# Patient Record
Sex: FEMALE | Race: WHITE | NOT HISPANIC OR LATINO | Employment: UNEMPLOYED | URBAN - METROPOLITAN AREA
[De-identification: names, ages, dates, MRNs, and addresses within clinical notes are randomized per-mention and may not be internally consistent; named-entity substitution may affect disease eponyms.]

---
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
## 2020-01-02 ENCOUNTER — PATIENT MESSAGE (OUTPATIENT)
Dept: NEUROLOGY | Facility: CLINIC | Age: 20
End: 2020-01-02

## 2020-01-02 ENCOUNTER — OFFICE VISIT (OUTPATIENT)
Dept: NEUROLOGY | Facility: CLINIC | Age: 20
End: 2020-01-02
Payer: COMMERCIAL

## 2020-01-02 ENCOUNTER — LAB VISIT (OUTPATIENT)
Dept: LAB | Facility: HOSPITAL | Age: 20
End: 2020-01-02
Attending: PSYCHIATRY & NEUROLOGY
Payer: COMMERCIAL

## 2020-01-02 VITALS
WEIGHT: 227.5 LBS | BODY MASS INDEX: 40.31 KG/M2 | HEIGHT: 63 IN | DIASTOLIC BLOOD PRESSURE: 80 MMHG | SYSTOLIC BLOOD PRESSURE: 118 MMHG | HEART RATE: 90 BPM

## 2020-01-02 DIAGNOSIS — G40.109 EPILEPSY, LOCALIZATION-RELATED: ICD-10-CM

## 2020-01-02 DIAGNOSIS — E16.1 HYPERINSULINISM: ICD-10-CM

## 2020-01-02 DIAGNOSIS — E66.01 MORBID OBESITY WITH BMI OF 40.0-44.9, ADULT: ICD-10-CM

## 2020-01-02 LAB
ALBUMIN SERPL BCP-MCNC: 3.7 G/DL (ref 3.5–5.2)
ALP SERPL-CCNC: 109 U/L (ref 55–135)
ALT SERPL W/O P-5'-P-CCNC: 13 U/L (ref 10–44)
ANION GAP SERPL CALC-SCNC: 10 MMOL/L (ref 8–16)
AST SERPL-CCNC: 12 U/L (ref 10–40)
BASOPHILS # BLD AUTO: 0.02 K/UL (ref 0–0.2)
BASOPHILS NFR BLD: 0.3 % (ref 0–1.9)
BILIRUB SERPL-MCNC: 0.2 MG/DL (ref 0.1–1)
BUN SERPL-MCNC: 18 MG/DL (ref 6–20)
CALCIUM SERPL-MCNC: 9.4 MG/DL (ref 8.7–10.5)
CHLORIDE SERPL-SCNC: 102 MMOL/L (ref 95–110)
CO2 SERPL-SCNC: 24 MMOL/L (ref 23–29)
CREAT SERPL-MCNC: 0.9 MG/DL (ref 0.5–1.4)
DIFFERENTIAL METHOD: ABNORMAL
EOSINOPHIL # BLD AUTO: 0.1 K/UL (ref 0–0.5)
EOSINOPHIL NFR BLD: 0.9 % (ref 0–8)
ERYTHROCYTE [DISTWIDTH] IN BLOOD BY AUTOMATED COUNT: 11.7 % (ref 11.5–14.5)
EST. GFR  (AFRICAN AMERICAN): >60 ML/MIN/1.73 M^2
EST. GFR  (NON AFRICAN AMERICAN): >60 ML/MIN/1.73 M^2
GLUCOSE SERPL-MCNC: 317 MG/DL (ref 70–110)
HCT VFR BLD AUTO: 36.2 % (ref 37–48.5)
HGB BLD-MCNC: 12.3 G/DL (ref 12–16)
IMM GRANULOCYTES # BLD AUTO: 0.01 K/UL (ref 0–0.04)
IMM GRANULOCYTES NFR BLD AUTO: 0.2 % (ref 0–0.5)
LYMPHOCYTES # BLD AUTO: 1.3 K/UL (ref 1–4.8)
LYMPHOCYTES NFR BLD: 20 % (ref 18–48)
MCH RBC QN AUTO: 31.5 PG (ref 27–31)
MCHC RBC AUTO-ENTMCNC: 34 G/DL (ref 32–36)
MCV RBC AUTO: 93 FL (ref 82–98)
MONOCYTES # BLD AUTO: 0.5 K/UL (ref 0.3–1)
MONOCYTES NFR BLD: 7.6 % (ref 4–15)
NEUTROPHILS # BLD AUTO: 4.7 K/UL (ref 1.8–7.7)
NEUTROPHILS NFR BLD: 71 % (ref 38–73)
NRBC BLD-RTO: 0 /100 WBC
PLATELET # BLD AUTO: 122 K/UL (ref 150–350)
PMV BLD AUTO: 12.4 FL (ref 9.2–12.9)
POTASSIUM SERPL-SCNC: 4.7 MMOL/L (ref 3.5–5.1)
PROT SERPL-MCNC: 7.7 G/DL (ref 6–8.4)
RBC # BLD AUTO: 3.9 M/UL (ref 4–5.4)
SODIUM SERPL-SCNC: 136 MMOL/L (ref 136–145)
WBC # BLD AUTO: 6.61 K/UL (ref 3.9–12.7)

## 2020-01-02 PROCEDURE — 80053 COMPREHEN METABOLIC PANEL: CPT

## 2020-01-02 PROCEDURE — 99999 PR PBB SHADOW E&M-EST. PATIENT-LVL III: CPT | Mod: PBBFAC,,, | Performed by: PSYCHIATRY & NEUROLOGY

## 2020-01-02 PROCEDURE — 36415 COLL VENOUS BLD VENIPUNCTURE: CPT | Mod: PO

## 2020-01-02 PROCEDURE — 99205 OFFICE O/P NEW HI 60 MIN: CPT | Mod: S$GLB,,, | Performed by: PSYCHIATRY & NEUROLOGY

## 2020-01-02 PROCEDURE — 80183 DRUG SCRN QUANT OXCARBAZEPIN: CPT

## 2020-01-02 PROCEDURE — 99205 PR OFFICE/OUTPT VISIT, NEW, LEVL V, 60-74 MIN: ICD-10-PCS | Mod: S$GLB,,, | Performed by: PSYCHIATRY & NEUROLOGY

## 2020-01-02 PROCEDURE — 85025 COMPLETE CBC W/AUTO DIFF WBC: CPT

## 2020-01-02 PROCEDURE — 99999 PR PBB SHADOW E&M-EST. PATIENT-LVL III: ICD-10-PCS | Mod: PBBFAC,,, | Performed by: PSYCHIATRY & NEUROLOGY

## 2020-01-02 RX ORDER — LEVONORGESTREL / ETHINYL ESTRADIOL AND ETHINYL ESTRADIOL 150-30(84)
1 KIT ORAL
COMMUNITY
Start: 2017-08-03 | End: 2021-01-07

## 2020-01-02 RX ORDER — OXCARBAZEPINE 300 MG/1
TABLET, FILM COATED ORAL
COMMUNITY
Start: 2019-06-03 | End: 2020-01-02 | Stop reason: SDUPTHER

## 2020-01-02 RX ORDER — LACOSAMIDE 200 MG/1
200 TABLET ORAL EVERY 12 HOURS
Qty: 60 TABLET | Refills: 5 | Status: SHIPPED | OUTPATIENT
Start: 2020-01-02 | End: 2020-01-27 | Stop reason: SDUPTHER

## 2020-01-02 RX ORDER — LEVOCETIRIZINE DIHYDROCHLORIDE 5 MG/1
5 TABLET, FILM COATED ORAL
COMMUNITY

## 2020-01-02 RX ORDER — MULTIVITAMIN
TABLET ORAL
COMMUNITY

## 2020-01-02 RX ORDER — MIDAZOLAM HYDROCHLORIDE 5 MG/ML
INJECTION INTRAMUSCULAR; INTRAVENOUS
COMMUNITY
Start: 2018-05-09 | End: 2020-06-11 | Stop reason: SDUPTHER

## 2020-01-02 RX ORDER — LEVOTHYROXINE SODIUM 137 UG/1
TABLET ORAL
COMMUNITY
Start: 2019-12-05 | End: 2020-07-16 | Stop reason: SDUPTHER

## 2020-01-02 RX ORDER — LACOSAMIDE 150 MG/1
TABLET ORAL
COMMUNITY
Start: 2019-08-05 | End: 2020-01-02 | Stop reason: DRUGHIGH

## 2020-01-02 NOTE — PROGRESS NOTES
Subjective:      Patient ID: Nirali Duenas is a 19 y.o. female.    Chief Complaint: She has epilepsy and knees in adult neurologist     The patient's mother, grandmother, and patient are in the clinic today with the history given of a seizure disorder that has been present since infancy L Long with a history of hyper insulin is and that was treated with a surgical procedure as an infant.  Following that a surgical procedure, the patient had episodes of hypoglycemia but this is now being controlled with medication.  The patient had been followed by [facility] in [facility] until reaching adult years when she now needs an adult neurologist.    The patient's mother indicates that the seizure develops without an aura.  The patient herself is not aware of an aura either.  The parents who have witnessed multiple of the episodes indicates that the patient will to suddenly stop activity and stares straight ahead then will have drooling followed by jerking that seems to occasionally start on the left but very quickly becomes bilateral with tonic-clonic seizure activity with her eyes rolled up.  She develops cyanosis around her lips and occasionally has tongue biting.  The seizure has variable duration but generally is less than 1-2 minutes.  The seizure is then followed by postictal drowsiness and sleeping and occasional nausea and vomiting.  Recovery from the seizure is variable in terms of time and sometimes can take 1-2 days to fully recover.    The patient's mother has been very accurate in keeping a seizure log in indicate that her last seizure occurred Thanksgiving, 2019.  Previous seizures had occurred on November 6th, August 20 6th of July 24th of July 16th and 1 other time in July.   Over the years since infancy, the longest seizure-free interval was 1 year. The mother indicates that she has noted the seizures could be triggered perhaps by stress and not resting consistently.  She has not had any  recent episodes of hypoglycemia.    The patient is a current seizure medication is Vimpat 150 mg twice a day and Trileptal 300 mg at night with 450 mg in the morning.    Past medications have included Keppra which produced severe anger management difficulties along with extreme moodiness.  The patient as an infant was on Dilantin and phenobarbital.  She was on Zonegran briefly but this was discontinued for unknown reasons.  She was on Tegretol briefly before switching to try Trileptal.  She had access to Diastat rectally for intractable seizure but now is utilizing Versed nasal spray as needed for prolonged or repetitive seizure.  The patient has not been on any other seizure medication.          ROS:  GENERAL: NO FEVER, CHILLS, FATIGABILITY OR WEIGHT LOSS.  SKIN: NO RASHES, ITCHING OR CHANGES IN COLOR OR TEXTURE OF SKIN.  HEAD: NO HEADACHES OR RECENT HEAD TRAUMA.  EYES: VISUAL ACUITY FINE. NO PHOTOPHOBIA, OCULAR PAIN OR DIPLOPIA.  EARS: DENIES EAR PAIN, DISCHARGE OR VERTIGO.  NOSE: NO LOSS OF SMELL, NO EPISTAXIS OR POSTNASAL DRIP.  MOUTH & THROAT: NO HOARSENESS OR CHANGE IN VOICE. NO EXCESSIVE GUM BLEEDING.  NODES: DENIES SWOLLEN GLANDS.  CHEST: DENIES POE, CYANOSIS, WHEEZING, COUGH AND SPUTUM PRODUCTION.  CARDIOVASCULAR: DENIES CHEST PAIN, PND, ORTHOPNEA OR REDUCED EXERCISE TOLERANCE.  ABDOMEN: APPETITE FINE. NO WEIGHT LOSS. DENIES DIARRHEA, ABDOMINAL PAIN, HEMATEMESIS OR BLOOD IN STOOL.  URINARY: NO FLANK PAIN, DYSURIA OR HEMATURIA.  PERIPHERAL VASCULAR: NO CLAUDICATION OR CYANOSIS.  MUSCULOSKELETAL: NO JOINT STIFFNESS OR SWELLING. DENIES BACK PAIN.  NEUROLOGIC: NO HISTORY OF  PARALYSIS, ALTERATION OF GAIT OR COORDINATION.    Past Medical History:   Diagnosis Date    Hyperinsulinism     Seizures      History reviewed. No pertinent surgical history.  History reviewed. No pertinent family history.  Social History     Socioeconomic History    Marital status: Single     Spouse name: Not on file    Number of  children: Not on file    Years of education: Not on file    Highest education level: Not on file   Occupational History    Not on file   Social Needs    Financial resource strain: Not on file    Food insecurity:     Worry: Not on file     Inability: Not on file    Transportation needs:     Medical: Not on file     Non-medical: Not on file   Tobacco Use    Smoking status: Never Smoker    Smokeless tobacco: Never Used   Substance and Sexual Activity    Alcohol use: Never     Frequency: Never    Drug use: Never    Sexual activity: Never     Partners: Male   Lifestyle    Physical activity:     Days per week: Not on file     Minutes per session: Not on file    Stress: Not on file   Relationships    Social connections:     Talks on phone: Not on file     Gets together: Not on file     Attends Jain service: Not on file     Active member of club or organization: Not on file     Attends meetings of clubs or organizations: Not on file     Relationship status: Not on file   Other Topics Concern    Not on file   Social History Narrative    Not on file         Objective:   PE:   VITAL SIGNS:   Height 5 ft 3 in, weight 103.2 kg, BMI 40.30  Vitals:    01/02/20 0915   BP: 118/80   Pulse: 90     APPEARANCE: WELL NOURISHED, WELL DEVELOPED, IN NO ACUTE DISTRESS.    HEAD: NORMOCEPHALIC, ATRAUMATIC.  EYES: PERRL. EOMI.  NON-ICTERIC SCLERAE.    NOSE: MUCOSA PINK. AIRWAY CLEAR.  MOUTH & THROAT: NO TONSILLAR ENLARGEMENT. NO PHARYNGEAL ERYTHEMA OR EXUDATE. NO STRIDOR.  NECK: SUPPLE. NO BRUITS.  CHEST: LUNGS CLEAR TO AUSCULTATION.  CARDIOVASCULAR: REGULAR RHYTHM WITHOUT SIGNIFICANT MURMURS.  ABDOMEN: BOWEL SOUNDS NORMAL. NOT DISTENDED. SOFT. NO TENDERNESS OR MASSES.  MUSCULOSKELETAL:  NO BONY DEFORMITY SEEN.  MUSCLE TONE  AND MUSCLE MASS ARE NORMAL IN BOTH UPPER AND BOTH LOWER EXTREMITIES.    NEUROLOGIC:   MENTAL STATUS:  THE PATIENT IS WELL ORIENTED TO PERSON, TIME, PLACE, AND SITUATION.  THE PATIENT IS ATTENTIVE TO THE  ENVIRONMENT AND COOPERATIVE FOR THE EXAM.  CRANIAL NERVES: II-XII GROSSLY INTACT. FUNDOSCOPIC EXAM IS NORMAL.  NO HEMORRHAGE, EXUDATE OR PAPILLEDEMA IS PRESENT. THE EXTRAOCULAR MUSCLES ARE INTACT IN THE CARDINAL DIRECTIONS OF GAZE.  NO PTOSIS IS PRESENT. FACIAL FEATURES ARE SYMMETRICAL.  SPEECH IS NORMAL IN FLUENCY, DICTION, AND PHRASING.  TONGUE PROTRUDES IN THE MIDLINE.    GAIT AND STATION:  ROMBERG IS NEGATIVE.  GOOD ALTERNATE ARMSWING WITH NORMAL GAIT.  MOTOR:  NO DOWNDRIFT OF EITHER ARM WHEN HELD AT SHOULDER LEVEL.  MANUAL MUSCLE TESTING OF PROXIMAL AND DISTAL MUSCLES OF BOTH UPPER AND LOWER EXTREMITIES IS NORMAL. MUSCLE MASS IS NORMAL.  MUSCLE TONE IS NORMAL.  SENSORY:  INTACT BOTH UPPER AND LOWER EXTREMITIES TO PIN PRICK, TOUCH, AND VIBRATION.  CEREBELLAR:  FINGER TO NOSE DONE WELL.  ALTERNATING MOVEMENTS INTACT.  NO INVOLUNTARY MOVEMENTS OR TREMOR SEEN.  REFLEXES:  STRETCH REFLEXES ARE 2+ BOTH UPPER AND LOWER EXTREMITIES.  PLANTAR STIMULATION IS FLEXOR BILATERALLY AND NO PATHOLOGICAL REFLEXES ARE SEEN              Assessment:     Encounter Diagnoses   Name Primary?    Epilepsy, localization-related     Hyperinsulinism     Morbid obesity with BMI of 40.0-44.9, adult        Discussion:  The patient is very longstanding history of an epileptic disorder and has good control but not perfect control over her seizures at the present time on her current medications.  We will need to adjust Vimpat 1st and then get blood level of Trileptal.  She has not had recent CBC or electrolyte lab done.      Plan:     1.  Increase Vimpat to 200 mg twice a day but continue Trileptal unchanged for now  2.  Lab today: CBC, CMP, ox carbamazepine level  3.  Depending upon lab results, further recommendations will be made   4.  The patient's mother is encouraged to utilize the patient portal to increase communication regarding seizure frequency and response of medication  5.  Routine follow-up visit with Neurology in 6 months.       This was a 60 min visit with the patient, the patient's mother and grandmother with over 50% time spent counseling the parents regarding the diagnosis and management of her seizure disorder.  This note is generated with speech recognition software and is subject to transcription error and sound alike phrases that may be missed by proofreading.

## 2020-01-03 RX ORDER — OXCARBAZEPINE 300 MG/1
TABLET, FILM COATED ORAL
Qty: 135 TABLET | Refills: 5 | Status: SHIPPED | OUTPATIENT
Start: 2020-01-03 | End: 2020-01-13 | Stop reason: SDUPTHER

## 2020-01-06 LAB — OXCARBAZEPINE METABOLITE: 9 MCG/ML (ref 3–35)

## 2020-01-10 ENCOUNTER — PATIENT MESSAGE (OUTPATIENT)
Dept: NEUROLOGY | Facility: CLINIC | Age: 20
End: 2020-01-10

## 2020-01-13 RX ORDER — OXCARBAZEPINE 300 MG/1
TABLET, FILM COATED ORAL
Qty: 135 TABLET | Refills: 5 | Status: SHIPPED | OUTPATIENT
Start: 2020-01-13 | End: 2020-01-27 | Stop reason: SDUPTHER

## 2020-01-13 RX ORDER — LACOSAMIDE 200 MG/1
200 TABLET ORAL EVERY 12 HOURS
Qty: 60 TABLET | Refills: 5 | Status: CANCELLED | OUTPATIENT
Start: 2020-01-13 | End: 2021-01-12

## 2020-01-13 NOTE — TELEPHONE ENCOUNTER
The patient should be on Vimpat 200 mg BID and Trileptal 450 mg BID.  The maximum dose of Vimpat is 400 mg/day.

## 2020-01-13 NOTE — TELEPHONE ENCOUNTER
See message/ pt on 450mg of trileptal  Please advise pt is requesting refills since dosages have changed vimpat  Is 300 mg

## 2020-01-27 ENCOUNTER — PATIENT MESSAGE (OUTPATIENT)
Dept: NEUROLOGY | Facility: CLINIC | Age: 20
End: 2020-01-27

## 2020-01-27 RX ORDER — LACOSAMIDE 200 MG/1
300 TABLET ORAL 2 TIMES DAILY
Qty: 60 TABLET | Refills: 5 | Status: SHIPPED | OUTPATIENT
Start: 2020-01-27 | End: 2020-02-25 | Stop reason: SDUPTHER

## 2020-01-27 RX ORDER — OXCARBAZEPINE 300 MG/1
TABLET, FILM COATED ORAL
Qty: 90 TABLET | Refills: 5 | Status: SHIPPED | OUTPATIENT
Start: 2020-01-27 | End: 2020-06-11

## 2020-01-27 NOTE — TELEPHONE ENCOUNTER
last visit 1-2-20  Last refill vimpat  Pt says changes needed  On both these meds.   Last refill trileptal pt takes 300mg am and 450mg in pm

## 2020-02-07 ENCOUNTER — PATIENT MESSAGE (OUTPATIENT)
Dept: NEUROLOGY | Facility: CLINIC | Age: 20
End: 2020-02-07

## 2020-02-20 ENCOUNTER — PATIENT MESSAGE (OUTPATIENT)
Dept: NEUROLOGY | Facility: CLINIC | Age: 20
End: 2020-02-20

## 2020-02-25 RX ORDER — LACOSAMIDE 200 MG/1
300 TABLET ORAL 2 TIMES DAILY
Qty: 60 TABLET | Refills: 5 | Status: SHIPPED | OUTPATIENT
Start: 2020-02-25 | End: 2020-04-09 | Stop reason: SDUPTHER

## 2020-03-05 ENCOUNTER — PATIENT MESSAGE (OUTPATIENT)
Dept: NEUROLOGY | Facility: CLINIC | Age: 20
End: 2020-03-05

## 2020-03-13 ENCOUNTER — PATIENT MESSAGE (OUTPATIENT)
Dept: NEUROLOGY | Facility: CLINIC | Age: 20
End: 2020-03-13

## 2020-04-09 ENCOUNTER — PATIENT MESSAGE (OUTPATIENT)
Dept: NEUROLOGY | Facility: CLINIC | Age: 20
End: 2020-04-09

## 2020-04-09 RX ORDER — LACOSAMIDE 200 MG/1
TABLET ORAL
Qty: 120 TABLET | Refills: 5 | Status: SHIPPED | OUTPATIENT
Start: 2020-04-09 | End: 2020-06-11 | Stop reason: DRUGHIGH

## 2020-06-04 ENCOUNTER — PATIENT MESSAGE (OUTPATIENT)
Dept: NEUROLOGY | Facility: CLINIC | Age: 20
End: 2020-06-04

## 2020-06-04 DIAGNOSIS — G40.802 EPILEPSY WITH BOTH GENERALIZED AND FOCAL FEATURES: Primary | ICD-10-CM

## 2020-06-11 ENCOUNTER — OFFICE VISIT (OUTPATIENT)
Dept: NEUROLOGY | Facility: CLINIC | Age: 20
End: 2020-06-11
Payer: COMMERCIAL

## 2020-06-11 VITALS
HEART RATE: 80 BPM | OXYGEN SATURATION: 99 % | WEIGHT: 220.44 LBS | SYSTOLIC BLOOD PRESSURE: 108 MMHG | BODY MASS INDEX: 39.06 KG/M2 | HEIGHT: 63 IN | DIASTOLIC BLOOD PRESSURE: 74 MMHG

## 2020-06-11 DIAGNOSIS — E16.1 HYPERINSULINISM: ICD-10-CM

## 2020-06-11 DIAGNOSIS — G40.109 EPILEPSY, LOCALIZATION-RELATED: Primary | ICD-10-CM

## 2020-06-11 DIAGNOSIS — E66.01 MORBID OBESITY WITH BMI OF 40.0-44.9, ADULT: ICD-10-CM

## 2020-06-11 PROCEDURE — 99214 PR OFFICE/OUTPT VISIT, EST, LEVL IV, 30-39 MIN: ICD-10-PCS | Mod: S$GLB,,, | Performed by: PSYCHIATRY & NEUROLOGY

## 2020-06-11 PROCEDURE — 99214 OFFICE O/P EST MOD 30 MIN: CPT | Mod: S$GLB,,, | Performed by: PSYCHIATRY & NEUROLOGY

## 2020-06-11 PROCEDURE — 99999 PR PBB SHADOW E&M-EST. PATIENT-LVL III: ICD-10-PCS | Mod: PBBFAC,,, | Performed by: PSYCHIATRY & NEUROLOGY

## 2020-06-11 PROCEDURE — 99999 PR PBB SHADOW E&M-EST. PATIENT-LVL III: CPT | Mod: PBBFAC,,, | Performed by: PSYCHIATRY & NEUROLOGY

## 2020-06-11 RX ORDER — MIDAZOLAM HYDROCHLORIDE 5 MG/ML
INJECTION INTRAMUSCULAR; INTRAVENOUS
Qty: 2 ML | Refills: 5 | Status: SHIPPED | OUTPATIENT
Start: 2020-06-11 | End: 2020-06-11 | Stop reason: SDUPTHER

## 2020-06-11 RX ORDER — LACOSAMIDE 150 MG/1
300 TABLET ORAL 2 TIMES DAILY
Qty: 120 TABLET | Refills: 11 | Status: SHIPPED | OUTPATIENT
Start: 2020-06-11 | End: 2021-01-07 | Stop reason: SDUPTHER

## 2020-06-11 RX ORDER — CEPHALEXIN 500 MG/1
500 CAPSULE ORAL 3 TIMES DAILY
COMMUNITY
Start: 2020-06-10 | End: 2020-06-20

## 2020-06-11 RX ORDER — ONDANSETRON 8 MG/1
TABLET, ORALLY DISINTEGRATING ORAL
COMMUNITY
Start: 2020-03-20

## 2020-06-11 RX ORDER — OXCARBAZEPINE 300 MG/1
600 TABLET, FILM COATED ORAL 2 TIMES DAILY
Qty: 120 TABLET | Refills: 5 | Status: SHIPPED | OUTPATIENT
Start: 2020-06-11 | End: 2020-12-31

## 2020-06-11 RX ORDER — INSULIN DETEMIR 100 [IU]/ML
5 INJECTION, SOLUTION SUBCUTANEOUS
COMMUNITY
Start: 2020-05-29 | End: 2021-07-12 | Stop reason: SDUPTHER

## 2020-06-11 RX ORDER — URINE ACETONE TEST STRIPS
STRIP MISCELLANEOUS
COMMUNITY
Start: 2020-03-04 | End: 2022-08-10

## 2020-06-11 RX ORDER — OMEPRAZOLE 20 MG/1
20 CAPSULE, DELAYED RELEASE ORAL DAILY
COMMUNITY
Start: 2020-05-29

## 2020-06-11 RX ORDER — MIDAZOLAM HYDROCHLORIDE 5 MG/ML
INJECTION INTRAMUSCULAR; INTRAVENOUS
Qty: 2 ML | Refills: 5 | Status: SHIPPED | OUTPATIENT
Start: 2020-06-11 | End: 2022-02-03

## 2020-06-11 RX ORDER — INSULIN LISPRO 100 [IU]/ML
INJECTION, SOLUTION INTRAVENOUS; SUBCUTANEOUS
COMMUNITY
Start: 2020-05-18 | End: 2020-10-14 | Stop reason: SDUPTHER

## 2020-06-11 RX ORDER — MELOXICAM 15 MG/1
15 TABLET ORAL DAILY
COMMUNITY
Start: 2020-05-29 | End: 2022-02-03

## 2020-06-11 NOTE — PROGRESS NOTES
Subjective:      Patient ID: Davis Duenas is a 19 y.o. female.    Chief Complaint:   Follow-up for epilepsy     The patient and her mother returned today with the mother providing the history indicating that the patient is averaging about 1 seizure a month.  However, she recently had an event of 2 seizures 1 evening and then another seizure the next morning.  Her longest seizure-free interval in the last several years has been 3 months.    Her current medications for epilepsy are Trileptal 450 mg twice a day and Vimpat 300 mg twice a day.  She has not had recent anticonvulsant levels.    The patient is doing well otherwise.  She has not had any weakness of her arms or legs.  She denies any change in walking or standing balance.  She has not had any nausea or vomiting.  She has not had any diarrhea or abdominal cramps.  The mother has noted that the patient will occasionally have complaint of headache prior to having a seizure.          ROS:  GENERAL: NO FEVER, CHILLS, FATIGABILITY OR WEIGHT LOSS.  SKIN: NO RASHES, ITCHING OR CHANGES IN COLOR OR TEXTURE OF SKIN.  HEAD: NO  RECENT HEAD TRAUMA.  EYES: VISUAL ACUITY FINE. NO PHOTOPHOBIA, OCULAR PAIN OR DIPLOPIA.  EARS: DENIES EAR PAIN, DISCHARGE OR VERTIGO.  NOSE: NO LOSS OF SMELL, NO EPISTAXIS OR POSTNASAL DRIP.  MOUTH & THROAT: NO HOARSENESS OR CHANGE IN VOICE. NO EXCESSIVE GUM BLEEDING.  NODES: DENIES SWOLLEN GLANDS.  CHEST: DENIES POE, CYANOSIS, WHEEZING, COUGH AND SPUTUM PRODUCTION.  CARDIOVASCULAR: DENIES CHEST PAIN, PND, ORTHOPNEA OR REDUCED EXERCISE TOLERANCE.  ABDOMEN: APPETITE FINE. NO WEIGHT LOSS. DENIES DIARRHEA, ABDOMINAL PAIN, HEMATEMESIS OR BLOOD IN STOOL.  URINARY: NO FLANK PAIN, DYSURIA OR HEMATURIA.  PERIPHERAL VASCULAR: NO CLAUDICATION OR CYANOSIS.  MUSCULOSKELETAL: NO JOINT STIFFNESS OR SWELLING. DENIES BACK PAIN.  NEUROLOGIC: NO HISTORY OF SEIZURES, PARALYSIS, ALTERATION OF GAIT OR COORDINATION.    Past Medical History:   Diagnosis Date     Hyperinsulinism     Seizures      History reviewed. No pertinent surgical history.  History reviewed. No pertinent family history.  Social History     Socioeconomic History    Marital status: Single     Spouse name: Not on file    Number of children: Not on file    Years of education: Not on file    Highest education level: Not on file   Occupational History    Not on file   Social Needs    Financial resource strain: Not on file    Food insecurity:     Worry: Not on file     Inability: Not on file    Transportation needs:     Medical: Not on file     Non-medical: Not on file   Tobacco Use    Smoking status: Never Smoker    Smokeless tobacco: Never Used   Substance and Sexual Activity    Alcohol use: Never     Frequency: Never    Drug use: Never    Sexual activity: Never     Partners: Male   Lifestyle    Physical activity:     Days per week: Not on file     Minutes per session: Not on file    Stress: Not on file   Relationships    Social connections:     Talks on phone: Not on file     Gets together: Not on file     Attends Sikhism service: Not on file     Active member of club or organization: Not on file     Attends meetings of clubs or organizations: Not on file     Relationship status: Not on file   Other Topics Concern    Not on file   Social History Narrative    Not on file         Objective:   PE:   VITAL SIGNS:   Height 5 ft 3 in, weight 100 kg, BMI 39.05  Vitals:    06/11/20 1144   BP: 108/74   Pulse: 80     APPEARANCE: WELL NOURISHED, WELL DEVELOPED, IN NO ACUTE DISTRESS.    HEAD: NORMOCEPHALIC, ATRAUMATIC.  EYES: PERRL. EOMI.  NON-ICTERIC SCLERAE.    NOSE: MUCOSA PINK. AIRWAY CLEAR.  MOUTH & THROAT: NO TONSILLAR ENLARGEMENT. NO PHARYNGEAL ERYTHEMA OR EXUDATE. NO STRIDOR.  NECK: SUPPLE. NO BRUITS.  CHEST: LUNGS CLEAR TO AUSCULTATION.  CARDIOVASCULAR: REGULAR RHYTHM WITHOUT SIGNIFICANT MURMURS.  ABDOMEN: BOWEL SOUNDS NORMAL. NOT DISTENDED.  TRUNCAL OBESITY NOTED      NEUROLOGIC:   MENTAL  STATUS:  THE PATIENT IS WELL ORIENTED TO PERSON, TIME, PLACE, AND SITUATION.  THE PATIENT IS ATTENTIVE TO THE ENVIRONMENT AND COOPERATIVE FOR THE EXAM.  CRANIAL NERVES: II-XII GROSSLY INTACT. FUNDOSCOPIC EXAM IS NORMAL.  NO HEMORRHAGE, EXUDATE OR PAPILLEDEMA IS PRESENT. THE EXTRAOCULAR MUSCLES ARE INTACT IN THE CARDINAL DIRECTIONS OF GAZE.  NO PTOSIS IS PRESENT. FACIAL FEATURES ARE SYMMETRICAL.  SPEECH IS NORMAL IN FLUENCY, DICTION, AND PHRASING.  TONGUE PROTRUDES IN THE MIDLINE.    GAIT AND STATION:  ROMBERG IS NEGATIVE.  GOOD ALTERNATE ARMSWING WITH NORMAL GAIT.  MOTOR:  NO DOWNDRIFT OF EITHER ARM WHEN HELD AT SHOULDER LEVEL.  MANUAL MUSCLE TESTING OF PROXIMAL AND DISTAL MUSCLES OF BOTH UPPER AND LOWER EXTREMITIES IS NORMAL. MUSCLE MASS IS NORMAL.  MUSCLE TONE IS NORMAL.  SENSORY:  INTACT BOTH UPPER AND LOWER EXTREMITIES TO PIN PRICK, TOUCH, AND VIBRATION.  CEREBELLAR:  FINGER TO NOSE DONE WELL.  ALTERNATING MOVEMENTS INTACT.  NO INVOLUNTARY MOVEMENTS OR TREMOR SEEN.  REFLEXES:  STRETCH REFLEXES ARE 2+ BOTH UPPER AND LOWER EXTREMITIES.  PLANTAR STIMULATION IS FLEXOR BILATERALLY AND NO PATHOLOGICAL REFLEXES ARE SEEN              Assessment:     Encounter Diagnoses   Name Primary?    Epilepsy, localization-related Yes    Hyperinsulinism     Morbid obesity with BMI of 40.0-44.9, adult            Plan:    1.  INCREASE TRILEPTAL  MG TWICE A DAY AND CONTINUE VIMPAT 300 MG TWICE A DAY  2.  IN 2 WEEKS, OBTAIN BLOOD LEVELS FOR TRILEPTAL AND VIMPAT  3.  ROUTINE FOLLOW-UP WITH NEUROLOGY IN 3 MONTHS     this was a 35 min visit with the patient and her mother with over 50% of the time spent counseling the patient's mother regarding management of her seizure and discussion of adjustment of seizure medications.  The patient will be seen by Dr. Piña in the future  This note is generated with speech recognition software and is subject to transcription error and sound alike phrases that may be missed by proofreading.

## 2020-07-07 ENCOUNTER — TELEPHONE (OUTPATIENT)
Dept: ENDOCRINOLOGY | Facility: CLINIC | Age: 20
End: 2020-07-07

## 2020-07-07 ENCOUNTER — OFFICE VISIT (OUTPATIENT)
Dept: ENDOCRINOLOGY | Facility: CLINIC | Age: 20
End: 2020-07-07
Payer: COMMERCIAL

## 2020-07-07 VITALS
DIASTOLIC BLOOD PRESSURE: 84 MMHG | HEART RATE: 83 BPM | WEIGHT: 212.88 LBS | HEIGHT: 63 IN | BODY MASS INDEX: 37.72 KG/M2 | SYSTOLIC BLOOD PRESSURE: 126 MMHG

## 2020-07-07 DIAGNOSIS — E66.01 MORBID OBESITY WITH BMI OF 40.0-44.9, ADULT: ICD-10-CM

## 2020-07-07 DIAGNOSIS — E16.1 HYPERINSULINISM: Primary | ICD-10-CM

## 2020-07-07 DIAGNOSIS — R53.83 FATIGUE, UNSPECIFIED TYPE: ICD-10-CM

## 2020-07-07 PROCEDURE — 99999 PR PBB SHADOW E&M-EST. PATIENT-LVL IV: ICD-10-PCS | Mod: PBBFAC,,, | Performed by: INTERNAL MEDICINE

## 2020-07-07 PROCEDURE — 99204 PR OFFICE/OUTPT VISIT, NEW, LEVL IV, 45-59 MIN: ICD-10-PCS | Mod: S$GLB,,, | Performed by: INTERNAL MEDICINE

## 2020-07-07 PROCEDURE — 99999 PR PBB SHADOW E&M-EST. PATIENT-LVL IV: CPT | Mod: PBBFAC,,, | Performed by: INTERNAL MEDICINE

## 2020-07-07 PROCEDURE — 99204 OFFICE O/P NEW MOD 45 MIN: CPT | Mod: S$GLB,,, | Performed by: INTERNAL MEDICINE

## 2020-07-07 NOTE — ASSESSMENT & PLAN NOTE
No hypoglycemia reported   Does not check blood sugars regularly   Taking levemir 5 units daily only  Not using meal time insulin regularly    Professional CGM    May need ameya if no hypoglycemia on professional.

## 2020-07-07 NOTE — TELEPHONE ENCOUNTER
Talk to pt mother they need labs mail to them for pt to take a week before her October appt with melissa

## 2020-07-07 NOTE — PROGRESS NOTES
Subjective:      Patient ID: Davis Duenas is a 19 y.o. female.    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 Has a history of hypothyroidism and epilepsy.   [facility] treated with diazoxide (stopped 2019) and pancreatectomy (18 1/2 years ago).    Today feels sleepy.     Regimen:  Levemir 5 units in the morning.   Humalog sliding scale before meals  1:50  Goal of 150     Denies any side effects.  Denies any difficulties with injections or sites of injections.  Denies hypoglycemic episodes or symptoms.      Diabetes complications:  Last eye evaluation   Surgery for strabismus last year.   Denies numbness, tingling, burning sensation.    Denies hospitalizations for hyper- or hypo- glycemia in the past year.     24-h dietary recall:  Breakfast - generally skips   Lunch - does not eat / unclear   Dinner - mexican    Diabetes education:     SMBG: does not checking regularly  Exercise: no formal exercise    Also has hypothyroidism, on treatment with levothyroxine 137 mcg HALF a tablet daily.    Review of Systems   Constitutional: Negative for fever.   HENT: Negative for congestion.    Eyes: Negative for visual disturbance.   Respiratory: Negative for shortness of breath.    Cardiovascular: Negative for chest pain.   Gastrointestinal: Negative for abdominal pain.   Genitourinary: Negative for dysuria.   Musculoskeletal: Negative for arthralgias.   Skin: Negative for rash.   Neurological: Negative for weakness.   Hematological: Does not bruise/bleed easily.   Psychiatric/Behavioral: Negative for sleep disturbance.       Objective:   Physical Exam  Constitutional:       General: She is not in acute distress.     Appearance: She is well-developed.   HENT:      Head: Normocephalic and atraumatic.      Mouth/Throat:      Pharynx: No oropharyngeal exudate.   Eyes:      General: No  "scleral icterus.     Conjunctiva/sclera: Conjunctivae normal.      Pupils: Pupils are equal, round, and reactive to light.   Neck:      Musculoskeletal: Normal range of motion and neck supple.      Thyroid: No thyromegaly.      Trachea: No tracheal deviation.   Cardiovascular:      Rate and Rhythm: Normal rate and regular rhythm.      Heart sounds: Normal heart sounds.   Pulmonary:      Effort: Pulmonary effort is normal.      Breath sounds: Normal breath sounds.   Abdominal:      General: Bowel sounds are normal. There is no distension.      Palpations: Abdomen is soft.      Tenderness: There is no abdominal tenderness.      Comments: Legs and arms --> sites of insulin administration are normal appearing.   Musculoskeletal: Normal range of motion.         General: No tenderness.   Lymphadenopathy:      Cervical: No cervical adenopathy.   Skin:     General: Skin is warm and dry.   Neurological:      Mental Status: She is alert and oriented to person, place, and time.      Cranial Nerves: No cranial nerve deficit.      Deep Tendon Reflexes: Reflexes are normal and symmetric.       Vitals:    07/07/20 0820   BP: 126/84   Pulse: 83   Weight: 96.6 kg (212 lb 13.7 oz)   Height: 5' 3" (1.6 m)       BP Readings from Last 3 Encounters:   07/07/20 126/84   06/11/20 108/74   01/02/20 118/80     Wt Readings from Last 1 Encounters:   07/07/20 0820 96.6 kg (212 lb 13.7 oz) (98 %, Z= 2.12)*     * Growth percentiles are based on CDC (Girls, 2-20 Years) data.         Body mass index is 37.71 kg/m².    Lab Review:   No results found for: HGBA1C  No results found for: CHOL, HDL, LDLCALC, TRIG, CHOLHDL  Lab Results   Component Value Date     01/02/2020    K 4.7 01/02/2020     01/02/2020    CO2 24 01/02/2020     (H) 01/02/2020    BUN 18 01/02/2020    CREATININE 0.9 01/02/2020    CALCIUM 9.4 01/02/2020    PROT 7.7 01/02/2020    ALBUMIN 3.7 01/02/2020    BILITOT 0.2 01/02/2020    ALKPHOS 109 01/02/2020    AST 12 " 01/02/2020    ALT 13 01/02/2020    ANIONGAP 10 01/02/2020    ESTGFRAFRICA >60.0 01/02/2020    EGFRNONAA >60.0 01/02/2020         Assessment and Plan     Hyperinsulinism  No hypoglycemia reported   Does not check blood sugars regularly   Taking levemir 5 units daily only  Not using meal time insulin regularly    Professional CGM    May need ameya if no hypoglycemia on professional.    Fatigue  Very light menstrual cycles, on nexplanon  Check vitamin D levels

## 2020-08-13 ENCOUNTER — CLINICAL SUPPORT (OUTPATIENT)
Dept: ENDOCRINOLOGY | Facility: CLINIC | Age: 20
End: 2020-08-13
Payer: COMMERCIAL

## 2020-08-13 DIAGNOSIS — E66.01 MORBID OBESITY WITH BMI OF 40.0-44.9, ADULT: ICD-10-CM

## 2020-08-13 DIAGNOSIS — E16.1 HYPERINSULINISM: ICD-10-CM

## 2020-08-13 NOTE — PROGRESS NOTES
"DIABETES EDUCATOR NOTE   PLACEMENT OF FREESTYLE FALLON PRO SENSOR  CONTINOUS GLUCOSE MONITORING SYSTEM (CGMS)    Patient is here in clinic today for placement of continuous glucose monitoring sensor.      Each patient verified that they were here for CGMS procedure ordered by their provider and that they have a working glucose meter and supplies at home.   Patient will be provided with a Freestyle Fallon Sensor and a copy of the Continuous Glucose Monitoring Patient Log to fill out during the study.   A detailed  explanation of Continuous Glucose Monitoring was  provided. Patient informed that this is a blind procedure and that they will not actually see the blood sugar tracing in real time.  Reviewed with patient the [email or web address] patient education handout called "Your Freestyle Fallon Pro sensor: What you need to know" to review self-care during the study to avoid sensor loosening or removal ie... Bathing, Swimming, dressing, and exercising.   Instructed patient to check blood sugar using home glucometer and to record the following on provided patient log sheets:Blood sugar taken at home, Meals and snacks, Activity, and Diabetes medications taken and dosage    Patient was brought to a private location.  Arm for insertion was selected and prepared and allowed to dry. Glucose Sensor Serial Number [id number]  was inserted to back of patient's LEFT upper arm.    The following forms  were given and reviewed in detail with patient and all questions answered.   · Continuous Glucose Monitoring Patient Log #[id number]  · [employer] Patient Handout "Your Freestyle Fallon Pro Sensor: What you need to know"     Instructions held in a group: Time: 15 min   Insertion of sensor done individually in private:  Time: 5 minutes         "

## 2020-08-19 ENCOUNTER — CLINICAL SUPPORT (OUTPATIENT)
Dept: ENDOCRINOLOGY | Facility: CLINIC | Age: 20
End: 2020-08-19
Payer: COMMERCIAL

## 2020-08-19 DIAGNOSIS — E16.1 HYPERINSULINISM: ICD-10-CM

## 2020-08-19 PROCEDURE — 95251 CONT GLUC MNTR ANALYSIS I&R: CPT | Mod: ,,, | Performed by: INTERNAL MEDICINE

## 2020-08-19 PROCEDURE — 95250 CONT GLUC MNTR PHYS/QHP EQP: CPT | Mod: PBBFAC | Performed by: DIETITIAN, REGISTERED

## 2020-08-19 PROCEDURE — 95251 PR GLUCOSE MONITOR, 72 HOUR, PHYS INTERP: ICD-10-PCS | Mod: ,,, | Performed by: INTERNAL MEDICINE

## 2020-08-25 NOTE — PROGRESS NOTES
DIABETES EDUCATOR NOTE   Return of the Freestyle Jez Pro Sensor and Patient Log.    Patient returned to clinic today to return Glucose Sensor and signed patient log form used in [facility] procedure.    The CGMS Sensor will be scanned and downloaded. All reports will be imported into the patient's electronic medical record.    Endocrine Provider will complete data interpretation and make recommendations; will forward recommendations to the ordering provider for follow up with patient.

## 2020-08-27 ENCOUNTER — TELEPHONE (OUTPATIENT)
Dept: ENDOCRINOLOGY | Facility: CLINIC | Age: 20
End: 2020-08-27

## 2020-08-27 DIAGNOSIS — E66.01 MORBID OBESITY WITH BMI OF 40.0-44.9, ADULT: ICD-10-CM

## 2020-08-27 DIAGNOSIS — Z90.410 HISTORY OF PANCREATECTOMY: Primary | ICD-10-CM

## 2020-08-27 DIAGNOSIS — R73.9 HYPERGLYCEMIA: ICD-10-CM

## 2020-08-27 DIAGNOSIS — E16.1 HYPERINSULINISM: ICD-10-CM

## 2020-08-27 RX ORDER — BLOOD-GLUCOSE SENSOR
3 EACH MISCELLANEOUS CONTINUOUS PRN
Qty: 3 EACH | Status: SHIPPED | OUTPATIENT
Start: 2020-08-27 | End: 2020-08-27

## 2020-08-27 RX ORDER — BLOOD-GLUCOSE SENSOR
3 EACH MISCELLANEOUS CONTINUOUS PRN
Qty: 3 EACH | Status: SHIPPED | OUTPATIENT
Start: 2020-08-27 | End: 2022-08-10

## 2020-08-27 RX ORDER — BLOOD-GLUCOSE TRANSMITTER
1 EACH MISCELLANEOUS CONTINUOUS PRN
Qty: 1 EACH | Status: SHIPPED | OUTPATIENT
Start: 2020-08-27 | End: 2022-08-10

## 2020-08-27 RX ORDER — BLOOD-GLUCOSE,RECEIVER,CONT
1 EACH MISCELLANEOUS CONTINUOUS PRN
Qty: 1 EACH | Status: SHIPPED | OUTPATIENT
Start: 2020-08-27 | End: 2020-08-27

## 2020-08-27 RX ORDER — BLOOD-GLUCOSE,RECEIVER,CONT
1 EACH MISCELLANEOUS CONTINUOUS PRN
Qty: 1 EACH | Status: SHIPPED | OUTPATIENT
Start: 2020-08-27 | End: 2022-08-10

## 2020-08-27 RX ORDER — BLOOD-GLUCOSE TRANSMITTER
1 EACH MISCELLANEOUS CONTINUOUS PRN
Qty: 1 EACH | Status: SHIPPED | OUTPATIENT
Start: 2020-08-27 | End: 2020-08-27

## 2020-09-10 ENCOUNTER — CLINICAL SUPPORT (OUTPATIENT)
Dept: DIABETES | Facility: CLINIC | Age: 20
End: 2020-09-10
Payer: COMMERCIAL

## 2020-09-10 ENCOUNTER — TELEPHONE (OUTPATIENT)
Dept: ENDOCRINOLOGY | Facility: CLINIC | Age: 20
End: 2020-09-10

## 2020-09-10 DIAGNOSIS — R73.9 HYPERGLYCEMIA: ICD-10-CM

## 2020-09-10 DIAGNOSIS — Z90.410 HISTORY OF PANCREATECTOMY: ICD-10-CM

## 2020-09-10 DIAGNOSIS — E16.1 HYPERINSULINISM: ICD-10-CM

## 2020-09-10 PROCEDURE — G0108 DIAB MANAGE TRN  PER INDIV: HCPCS | Mod: 95,,, | Performed by: DIETITIAN, REGISTERED

## 2020-09-10 PROCEDURE — G0108 PR DIAB MANAGE TRN  PER INDIV: ICD-10-PCS | Mod: 95,,, | Performed by: DIETITIAN, REGISTERED

## 2020-09-10 NOTE — PROGRESS NOTES
Diabetes Education  Author: Rocael Ellison, REJI  Date: 9/10/2020    Diabetes Care Specialist Virtual Visit Note   It was in the patient's best interest to receive diabetes self-management education and support services in this format to prevent the exposure to COVID-19.        The patient location is: home   The chief complaint leading to consultation is: Diabetes  Visit type: audiovisual  Total time spent with patient: 60 min   Each patient to whom he or she provides medical services by telemedicine is:  (1) informed of the relationship between the physician and patient and the respective role of any other health care provider with respect to management of the patient; and (2) notified that he or she may decline to receive medical services by telemedicine and may withdraw from such care at any time.      Diabetes Care Management Summary  Diabetes Education Record Assessment/Progress: Initial  Current Diabetes Risk Level: Moderate     Referring Provider: Rosette Westbrook MD  20 y.o. female in clinic today for diabetes education. Patient has seen an RD in the past in [place], [place] when initially started with hyperglycemia.    When pt was born, her BG was 11. She continued to have hypoglycemia and she was diagnosed with hyperinsulinemia.   Pancreas was removed at 6 mo    gtube placed at 12mo to give nighttime feedings to keep BG nimisha. She kept the gtube until 11 yo to take medications.    Pt had been seeing physicians at [facility] in [place]. Recently transferred to [facility] [place]             There is no height or weight on file to calculate BMI.      Last A1c:   Lab Results   Component Value Date    HGBA1C 7.8 (H) 07/07/2020     Last visit with Diabetes Educator: Last Education Visit: Not Found      Diabetes Type  Diabetes Type : Type II    Diabetes History  Current Treatment: Insulin, Diet(Levemir, 5 units in am // Humalog SS)  Reviewed Problem List with Patient: Yes   Humalog SS:  150-199, 1  unit  200-249, 2 units  250-299, 3 units  300-349, 4 units  350-499, 5 units      Health Maintenance was reviewed today with patient. Discussed with patient importance of routine eye exams, foot exams/foot care, blood work (i.e.: A1c, microalbumin, and lipid), dental visits, yearly flu vaccine, and pneumonia vaccine as indicated by PCP. Patient verbalized understanding.     Health Maintenance Topics with due status: Not Due       Topic Last Completion Date    Hemoglobin A1c 07/07/2020     Health Maintenance Due   Topic Date Due    Hepatitis C Screening  2000    Lipid Panel  2000    Foot Exam  09/05/2010    Eye Exam  09/05/2010    Urine Microalbumin  09/05/2010    HPV Vaccines (1 - 2-dose series) 09/05/2011    HIV Screening  09/05/2015    Chlamydia Screening  09/05/2015    TETANUS VACCINE  09/05/2018    Pneumococcal Vaccine (Medium Risk) (1 of 1 - PPSV23) 09/05/2019    Influenza Vaccine (1) 08/01/2020       Nutrition  Meal Planning: skipping meals, eats out often, drinks regular soda, snacks between meal  What type of sweetener do you use?: none  What type of beverages do you drink?: regular soda/tea, diet soda/tea  Meal Plan 24 Hour Recall - Breakfast: none(wakes up at 9am to take Levemir and other meds then often goes back to sleep)  Meal Plan 24 Hour Recall - Lunch: fish plate - 4 pc fried fish, 1/2 order of French fries, Coke  Meal Plan 24 Hour Recall - Dinner: Bagel Bites, ~10, Coke  Meal Plan 24 Hour Recall - Snack: 9:30p - ice cream(sometimes eats during the night - chips or left overs)    Monitoring   Self Monitoring : tries to remember to check BG  In the last month, how often have you had a low blood sugar reaction?: never  Can you tell when your blood sugar is too high?: no    Exercise   Exercise Type: none    Current Diabetes Treatment   Current Treatment: Insulin, Diet(Levemir, 5 units in am // Humalog SS)    Social History  Occupation: pt does not work and is not in school           Did not complete DDS today, but pt does have a lot of distress.   Pt started crying during appt and has not accepted that her diet needs to change.                       Barriers to Change  Barriers to Change: Cognitive  Learning Challenges : Below Level for Age/Development    Readiness to Learn   Readiness to Learn : Non Acceptance    Cultural Influences  Cultural Influences: No    Diabetes Education Assessment/Progress  Diabetes Disease Process (diabetes disease process and treatment options): Discussion, Individual Session, Requires Assistance Family/SO  Nutrition (Incorporating nutritional management into one's lifestyle): Discussion, Instructed, Individual Session, Demonstration, Requires Assistance Family/SO, Written Materials Provided  Physical Activity (incorporating physical activity into one's lifestyle): Not Covered/Deferred  Medications (states correct name, dose, onset, peak, duration, side effects & timing of meds): Discussion, Instructed, Individual Session, Comprehends Key Points, Requires Assistance Family/SO  Monitoring (monitoring blood glucose/other parameters & using results): Discussion, Individual Session, Comprehends Key Points, Written Materials Provided  Acute Complications (preventing, detecting, and treating acute complications): Not Covered/Deferred  Chronic Complications (preventing, detecting, and treating chronic complications): Discussion, Individual Session, Comprehends Key Points  Clinical (diabetes, other pertinent medical history, and relevant comorbidities reviewed during visit): Discussion, Individual Session  Cognitive (knowledge of self-management skills, functional health literacy): Discussion, Individual Session, Requires Assistance Family/SO  Psychosocial (emotional response to diabetes): Not Covered/Deferred  Diabetes Distress and Support Systems: Not Covered/Deferred  Behavioral (readiness for change, lifestyle practices, self-care behaviors): Discussion, Individual  Session    Goals  Patient has selected/evaluated goals during today's session: Yes, selected  Healthy Eating: Set(Pt is to avoid beverages with sugar; replace with diet soda or SF Koolaid, etc.)  Start Date: 09/10/20  Target Date: 11/10/20  Monitoring: Set(Keep log of BG until she is approved for a CGM)  Start Date: 09/10/20  Target Date: 11/10/20    Discussed carb grams and serving sizes, but pt became overwhelmed. Mom will help patient with learning which foods can raise BG and how to just watch portions rather than counting grams of carb. For now, will just work on beverages and progress as able.        Diabetes Care Plan/Intervention  Education Plan/Intervention: Individual Follow-Up DSMT(f/u via virtual appt in 1 month - mom to send BG logs the day before visit)    Diabetes Meal Plan  Calories: 2200, 2400  Carbohydrate Per Meal: 60-75g  Carbohydrate Per Snack : 7-15g    Today's Self-Management Care Plan was developed with the patient's input and is based on barriers identified during today's assessment.    The long and short-term goals in the care plan were written with the patient/caregiver's input. The patient has agreed to work toward these goals to improve her overall diabetes control.      The patient received a copy of today's self-management plan and verbalized understanding of the care plan, goals, and all of today's instructions.      The patient was encouraged to communicate with her physician and care team regarding her condition(s) and treatment.  I provided the patient with my contact information today and encouraged her to contact me via phone or patient portal as needed.     Education Units of Time   Time Spent: 60 min

## 2020-09-10 NOTE — TELEPHONE ENCOUNTER
Spoke with mother regarding insurance out of network with [employer] company. Mother states she will contact insurance company to find out what [employer] company are they in network with. Waiting on mother to call back with information.

## 2020-09-10 NOTE — LETTER
September 10, 2020        Rosette Westbrook MD  [address]  [facility] [address]             [facility]  [address] [facility] [place] [address]  Phone: [phone]  Fax: [phone]   Patient: Davis Duenas   MR Number: [id number]   YOB: 2000   Date of Visit: 9/10/2020       Dear Dr. Westbrook:    Thank you for referring Davis Duenas to me for evaluation. Below are the relevant portions of my assessment and plan of care.    If you have questions, please do not hesitate to call me. I look forward to following Davis along with you.    Sincerely,      Rocael Ellison RD           CC  No Recipients

## 2020-09-11 ENCOUNTER — TELEPHONE (OUTPATIENT)
Dept: ENDOCRINOLOGY | Facility: CLINIC | Age: 20
End: 2020-09-11

## 2020-09-11 NOTE — TELEPHONE ENCOUNTER
Lifelong history of hyperinsulinism, hypoglycemia that was complicated   Now transitioning to avoiding high blood sugars    Discussed not using cokes/sprites/monster drinks  Had dm education   No change to insulin dosing for now until we can see what we can do without sugar drinks.     Spoke with Mom     Currently:  levemir 5 units (usually takes)  novolog 150 - 200 takes 2 units etc  max is 6 units units (usually forgets)

## 2020-10-02 ENCOUNTER — PATIENT MESSAGE (OUTPATIENT)
Dept: ENDOCRINOLOGY | Facility: CLINIC | Age: 20
End: 2020-10-02

## 2020-10-06 NOTE — PROGRESS NOTES
Subjective:      Patient ID: Davis Duenas is a 20 y.o. female.    Chief Complaint:  No chief complaint on file.    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  Previously seen by Dr. Westbrook.  Last seen 7/2020.  This is their first visit with me.    This is a [employer] video visit.    The patient location is: [place]  The chief complaint leading to consultation is: DM  Visit type: Virtual visit with synchronous audio and video  Total time spent with patient: see below  Each patient to whom he or she provides medical services by telemedicine is:  (1) informed of the relationship between the physician and patient and the respective role of any other health care provider with respect to management of the patient; and (2) notified that he or she may decline to receive medical services by telemedicine and may withdraw from such care at any time.      Followed with Endocrinology in [place]. LOV 1/31/2020  Davis is a 19 y.o. female with CHI, S/P 98% panc as an infant [facility] at the age of 6 months. She had hypoglycemia postop and so she was re-tried on Diazoxide which appear to work better at that time.    Over the course of the years she has done extremely well with almost no hypoglycemia while on Diazoxide and every time we tried to wean her off the Diazoxide she gets a recurrence of her hypoglycemia however earlier this year we are finally able to get her off Diazoxide. ( February of 2019). After 3 months her hemoglobin A1c 6.1%. Today she comes to clinic because a recent hemoglobin A1c was performed by her family doctor that was 7% and a random blood sugar was performed that was 191 mg/dL. Glucometer testing at home reveals fasting blood sugars ranging from 187-220 by glucometer. She does not have symptoms of polyuria or polydipsia or weight loss.    Thus based on this information she has random blood sugars greater than 200 with a hemoglobin A1c of 7% in  the absence of symptoms indicates that she has diabetes that is likely status post 98% pancreatectomy as 95 % of patients who have a 98% pancreatectomy will developed diabetes by age 50    Mom is present for visit.    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     Diagnosed:   Known complications:  DKA -  RN -  PN -  Nephropathy -  CAD -    Current regimen:  Levemir 5 units daily   Humalog with meals  150-200: +1 unit   201-250: +2 units   251-300: +3 units   301-350: +4 units   >350: +5 units     Not missing any basal. Missing prandial insulin.   Rotating injection sites.     Other medications tried:  None.     CGM 2020  Glucose Monitor:   0 times a day testing  Log reviewed: None.    Hypoglycemia:  Unsure - reports maybe once.   Knows how to correct with 15 grams of carbs- juice, coke, or a peppermint.     Diet/Exercise:  Eats 2 meals a day.   B: skips breakfast   L: chicken fingers  D: did not verbalize what she is typically eating   Snacks : chips   Drinks : water and diet mountain dew  Exercise: None.      Education - last visit: 2020  Eye Exam: > 1 year ago  Podiatry: None    Diabetes Management Status    Hemoglobin A1C   Date Value Ref Range Status   2020 7.8 (H) 4.0 - 5.6 % Final     Comment:     ADA Screening Guidelines:  5.7-6.4%  Consistent with prediabetes  >or=6.5%  Consistent with diabetes  High levels of fetal hemoglobin interfere with the HbA1C  assay. Heterozygous hemoglobin variants (HbS, HgC, etc)do  not significantly interfere with this assay.   However, presence of multiple variants may affect accuracy.         Statin: Not taking  ACE/ARB: Not taking  Screening or Prevention Patient's value Goal Complete/Controlled?   HgA1C Testing and Control   Lab Results   Component Value Date    HGBA1C 7.8 (H) 2020      Annually/Less than 8% Yes   Lipid profile Most Recent Lipid  Panel Health Maintenance Topic Completion: Not Found Annually No   LDL control No results found for: LDLCALC Annually/Less than 100 mg/dl  No   Nephropathy screening No results found for: LABMICR  No results found for: PROTEINUA Annually No   Blood pressure BP Readings from Last 1 Encounters:   07/07/20 126/84    Less than 140/90 Yes   Dilated retinal exam Most Recent Eye Exam Date: Not Found Annually No   Foot exam   Most Recent Foot Exam Date: Not Found Annually No     With regards to hypothyroidism:    Lab Results   Component Value Date    TSH 2.815 07/07/2020     Current Medication:  LT4 137mcg 1/2 tablet daily    Takes thyroid medication properly without food first thing in the morning.    Current Symptoms:   - Weight gain  + Fatigue   - Constipation   - Hair loss  - Brittle nails  - Mental fog   - cp, palpations or sob  - Heat intolerance  - Cold intolerance    Review of Systems   Constitutional: Positive for fatigue.   Eyes: Negative for visual disturbance.   Respiratory: Negative for shortness of breath.    Cardiovascular: Negative for chest pain.   Gastrointestinal: Negative for abdominal pain.   Musculoskeletal: Negative for arthralgias.   Skin: Negative for wound.   Neurological: Negative for headaches.   Hematological: Does not bruise/bleed easily.   Psychiatric/Behavioral: Negative for sleep disturbance.     There were no vitals taken for this visit.    There is no height or weight on file to calculate BMI.    Lab Review:   Lab Results   Component Value Date    HGBA1C 7.8 (H) 07/07/2020       No results found for: CHOL, HDL, LDLCALC, TRIG, CHOLHDL  Lab Results   Component Value Date     01/02/2020    K 4.7 01/02/2020     01/02/2020    CO2 24 01/02/2020     (H) 01/02/2020    BUN 18 01/02/2020    CREATININE 0.9 01/02/2020    CALCIUM 9.4 01/02/2020    PROT 7.7 01/02/2020    ALBUMIN 3.7 01/02/2020    BILITOT 0.2 01/02/2020    ALKPHOS 109 01/02/2020    AST 12 01/02/2020    ALT 13  2020    ANIONGAP 10 2020    ESTGFRAFRICA >60.0 2020    EGFRNONAA >60.0 2020    TSH 2.815 2020     Vit D, 25-Hydroxy   Date Value Ref Range Status   2020 34 30 - 96 ng/mL Final     Comment:     Vitamin D deficiency.........<10 ng/mL                              Vitamin D insufficiency......10-29 ng/mL       Vitamin D sufficiency........> or equal to 30 ng/mL  Vitamin D toxicity............>100 ng/mL       Assessment and Plan     1. Hyperinsulinism     2. Hypothyroidism, unspecified type     3. Morbid obesity with BMI of 40.0-44.9, adult         Hyperinsulinism  -- S/p 98% pancreatectomy at 6months old for  hyperinsulinism hypoglycemia.  Persistent hypoglycemia on Diazoxide until 2019.  Now transitioned to DM.    -- Check c peptide to assess beta cell function. Expecting it to be very low but may help with approval of personal CGM, etc.  -- Labs this week - fax external order.  -- A1c goal <7.0%.  -- Medications discussed:  Insulin   -- Reviewed logs/CGM:  Not checking glucose.  Professional CGM done 2020 with global hyperglycemia.  Recommendations were not made. Given she has cut out soft drinks and not checking glucose, I am not comfortable making changes today.  Instructed to check and document glucose premeal and before bed.  Instructed to send glucose logs in 7 days.  Reach out to me sooner for any glucose <70 or consistently >200.  -- Medication Changes:   CONTINUE:  Levemir 5 units daily   Humalog with meals  150-200: +1 unit   201-250: +2 units   251-300: +3 units   301-350: +4 units   >350: +5 units   -- Reviewed goals of therapy are to get the best control we can without hypoglycemia.  -- Reviewed patient's current insulin regimen. Clarified proper insulin dose and timing in relation to meals, etc. Insulin injection sites and proper rotation instructed.    -- Advised frequent self blood glucose monitoring.  Patient encouraged to document glucose results and  bring them to every clinic visit.  -- Hypoglycemia precautions discussed. Instructed on precautions before driving.    -- Call for Bg repeatedly < 90 or > 180.   -- Close adherence to lifestyle changes recommended.   -- Periodic follow ups for eye evaluations, foot care and dental care suggested.    Hypothyroidism  -- Labs this week - fax external order.  -- Medication Changes:   CONTINUE:  LT4 137mcg 1/2 tablet daily  -- Clinically and biochemically euthyroid.  -- Goal is a normal TSH.  -- Avoid exogenous hyperthyroidism as this can accelerate bone loss and increase risk of CV complications.  -- Advised to take LT4 on an empty stomach with water and to wait 30-45 minutes before eating or taking other medications   -- Reviewed usual times of thyroid hormone changes  -- Reviewed that symptoms of hypothyroidism may not correlate with tsh, and a normal TSH is the goal of therapy. Symptoms are not a justification for over treatment.    Morbid obesity with BMI of 40.0-44.9, adult  -- Encouraged dietary and lifestyle modifications.  -- Emphasized weight loss goals.    Follow up in about 4 weeks (around 11/4/2020).      I spent 30 minutes face-to-face with the patient, over half of the visit was spent on counseling and/or coordinating the care of the patient.    Counseling includes:  Diagnostic results, impressions, recommendations   Prognosis   Risk and benefits of management/treatment options   Instructions for management treatment and or follow-up   Importance of compliance with management   Risk factor reduction   Patient education    Check on status of Dexcom  Patient has diabetes mellitus and manages diabetes with an intensive insulin regimen. The patient requires a therapeutic CGM and is willing to use therapeutic CGM for the necesary frequent adjustments of insulin therapy. Patient has been using SMBG for frequent glucose monitoring (4+ times daily). I have completed an in-person visit during the previous 6 months  and will continue to have in-person visits every 6 months to assess adherence to their CGM regimen and diabetes treatment plan.

## 2020-10-07 ENCOUNTER — PATIENT MESSAGE (OUTPATIENT)
Dept: ENDOCRINOLOGY | Facility: CLINIC | Age: 20
End: 2020-10-07

## 2020-10-07 ENCOUNTER — OFFICE VISIT (OUTPATIENT)
Dept: ENDOCRINOLOGY | Facility: CLINIC | Age: 20
End: 2020-10-07
Payer: COMMERCIAL

## 2020-10-07 ENCOUNTER — TELEPHONE (OUTPATIENT)
Dept: ENDOCRINOLOGY | Facility: CLINIC | Age: 20
End: 2020-10-07

## 2020-10-07 DIAGNOSIS — E66.01 MORBID OBESITY WITH BMI OF 40.0-44.9, ADULT: ICD-10-CM

## 2020-10-07 DIAGNOSIS — E16.1 HYPERINSULINISM: Primary | ICD-10-CM

## 2020-10-07 DIAGNOSIS — E03.9 HYPOTHYROIDISM, UNSPECIFIED TYPE: ICD-10-CM

## 2020-10-07 PROCEDURE — 99214 OFFICE O/P EST MOD 30 MIN: CPT | Mod: 95,,, | Performed by: NURSE PRACTITIONER

## 2020-10-07 PROCEDURE — 99214 PR OFFICE/OUTPT VISIT, EST, LEVL IV, 30-39 MIN: ICD-10-PCS | Mod: 95,,, | Performed by: NURSE PRACTITIONER

## 2020-10-07 NOTE — ASSESSMENT & PLAN NOTE
-- Labs this week - fax external order.  -- Medication Changes:   CONTINUE:  LT4 137mcg 1/2 tablet daily  -- Clinically and biochemically euthyroid.  -- Goal is a normal TSH.  -- Avoid exogenous hyperthyroidism as this can accelerate bone loss and increase risk of CV complications.  -- Advised to take LT4 on an empty stomach with water and to wait 30-45 minutes before eating or taking other medications   -- Reviewed usual times of thyroid hormone changes  -- Reviewed that symptoms of hypothyroidism may not correlate with tsh, and a normal TSH is the goal of therapy. Symptoms are not a justification for over treatment.

## 2020-10-07 NOTE — ASSESSMENT & PLAN NOTE
-- S/p 98% pancreatectomy at 6months old for  hyperinsulinism hypoglycemia.  Persistent hypoglycemia on Diazoxide until 2019.  Now transitioned to DM.    -- Check c peptide to assess beta cell function. Expecting it to be very low but may help with approval of personal CGM, etc.  -- Labs this week - fax external order.  -- A1c goal <7.0%.  -- Medications discussed:  Insulin   -- Reviewed logs/CGM:  Not checking glucose.  Professional CGM done 2020 with global hyperglycemia.  Recommendations were not made. Given she has cut out soft drinks and not checking glucose, I am not comfortable making changes today.  Instructed to check and document glucose premeal and before bed.  Instructed to send glucose logs in 7 days.  Reach out to me sooner for any glucose <70 or consistently >200.  -- Medication Changes:   CONTINUE:  Levemir 5 units daily   Humalog with meals  150-200: +1 unit   201-250: +2 units   251-300: +3 units   301-350: +4 units   >350: +5 units   -- Reviewed goals of therapy are to get the best control we can without hypoglycemia.  -- Reviewed patient's current insulin regimen. Clarified proper insulin dose and timing in relation to meals, etc. Insulin injection sites and proper rotation instructed.    -- Advised frequent self blood glucose monitoring.  Patient encouraged to document glucose results and bring them to every clinic visit.  -- Hypoglycemia precautions discussed. Instructed on precautions before driving.    -- Call for Bg repeatedly < 90 or > 180.   -- Close adherence to lifestyle changes recommended.   -- Periodic follow ups for eye evaluations, foot care and dental care suggested.

## 2020-10-08 ENCOUNTER — PATIENT MESSAGE (OUTPATIENT)
Dept: ENDOCRINOLOGY | Facility: CLINIC | Age: 20
End: 2020-10-08

## 2020-10-13 ENCOUNTER — PATIENT MESSAGE (OUTPATIENT)
Dept: ENDOCRINOLOGY | Facility: CLINIC | Age: 20
End: 2020-10-13

## 2020-10-14 RX ORDER — INSULIN LISPRO 100 [IU]/ML
INJECTION, SOLUTION INTRAVENOUS; SUBCUTANEOUS
Qty: 3 ML | Refills: 6 | Status: SHIPPED | OUTPATIENT
Start: 2020-10-14 | End: 2020-10-19

## 2020-10-15 ENCOUNTER — PATIENT MESSAGE (OUTPATIENT)
Dept: NEUROLOGY | Facility: CLINIC | Age: 20
End: 2020-10-15

## 2020-10-15 DIAGNOSIS — G40.109 EPILEPSY, LOCALIZATION-RELATED: Primary | ICD-10-CM

## 2020-10-15 RX ORDER — MIDAZOLAM HYDROCHLORIDE 5 MG/ML
INJECTION INTRAMUSCULAR; INTRAVENOUS
Qty: 2 ML | Refills: 5 | OUTPATIENT
Start: 2020-10-15

## 2020-10-15 RX ORDER — MIDAZOLAM 5 MG/.1ML
5 SPRAY NASAL ONCE AS NEEDED
Qty: 5 EACH | Refills: 1 | Status: SHIPPED | OUTPATIENT
Start: 2020-10-15 | End: 2020-10-15

## 2020-10-16 ENCOUNTER — PATIENT MESSAGE (OUTPATIENT)
Dept: NEUROLOGY | Facility: CLINIC | Age: 20
End: 2020-10-16

## 2020-10-16 ENCOUNTER — PATIENT MESSAGE (OUTPATIENT)
Dept: ENDOCRINOLOGY | Facility: CLINIC | Age: 20
End: 2020-10-16

## 2020-10-16 DIAGNOSIS — E13.9 DIABETES MELLITUS SECONDARY TO PANCREATECTOMY: Primary | ICD-10-CM

## 2020-10-16 DIAGNOSIS — E89.1 DIABETES MELLITUS SECONDARY TO PANCREATECTOMY: Primary | ICD-10-CM

## 2020-10-16 DIAGNOSIS — E03.9 HYPOTHYROIDISM, UNSPECIFIED TYPE: Primary | ICD-10-CM

## 2020-10-16 DIAGNOSIS — Z90.410 DIABETES MELLITUS SECONDARY TO PANCREATECTOMY: Primary | ICD-10-CM

## 2020-10-16 RX ORDER — LEVOTHYROXINE SODIUM 75 UG/1
75 TABLET ORAL
Qty: 30 TABLET | Refills: 11 | Status: SHIPPED | OUTPATIENT
Start: 2020-10-16 | End: 2021-05-04 | Stop reason: SDUPTHER

## 2020-10-16 NOTE — TELEPHONE ENCOUNTER
Current Regimen  LT4 137 mcg one half tablet daily = ~68.5mcg daily    TSH >5    Change to LT4 75mcg daily     Labs in 6 weeks.    Patient and her mother verbalized understanding.

## 2020-10-19 ENCOUNTER — PATIENT MESSAGE (OUTPATIENT)
Dept: ENDOCRINOLOGY | Facility: CLINIC | Age: 20
End: 2020-10-19

## 2020-10-19 RX ORDER — INSULIN LISPRO 100 [IU]/ML
2 INJECTION, SOLUTION INTRAVENOUS; SUBCUTANEOUS
Qty: 3 ML | Refills: 6 | Status: SHIPPED | OUTPATIENT
Start: 2020-10-19 | End: 2021-07-12 | Stop reason: SDUPTHER

## 2020-10-20 ENCOUNTER — CLINICAL SUPPORT (OUTPATIENT)
Dept: DIABETES | Facility: CLINIC | Age: 20
End: 2020-10-20
Payer: COMMERCIAL

## 2020-10-20 ENCOUNTER — PATIENT MESSAGE (OUTPATIENT)
Dept: DIABETES | Facility: CLINIC | Age: 20
End: 2020-10-20

## 2020-10-20 VITALS — WEIGHT: 207 LBS | HEIGHT: 63 IN | BODY MASS INDEX: 36.68 KG/M2

## 2020-10-20 DIAGNOSIS — Z90.410 HISTORY OF PANCREATECTOMY: Primary | ICD-10-CM

## 2020-10-20 DIAGNOSIS — R73.9 HYPERGLYCEMIA: ICD-10-CM

## 2020-10-20 PROCEDURE — G0108 PR DIAB MANAGE TRN  PER INDIV: ICD-10-PCS | Mod: 95,,, | Performed by: DIETITIAN, REGISTERED

## 2020-10-20 PROCEDURE — G0108 DIAB MANAGE TRN  PER INDIV: HCPCS | Mod: 95,,, | Performed by: DIETITIAN, REGISTERED

## 2020-10-20 NOTE — PROGRESS NOTES
"Diabetes Education  Author: Rocael Ellison, REJI  Date: 10/20/2020    Diabetes Care Specialist Virtual Visit Note   It was in the patient's best interest to receive diabetes self-management education and support services in this format to prevent the exposure to COVID-19.        The patient location is: mother's workplace in [place]   The chief complaint leading to consultation is: Diabetes  Visit type: audiovisual  Total time spent with patient: 60 min   Each patient to whom he or she provides medical services by telemedicine is:  (1) informed of the relationship between the physician and patient and the respective role of any other health care provider with respect to management of the patient; and (2) notified that he or she may decline to receive medical services by telemedicine and may withdraw from such care at any time.      Diabetes Care Management Summary  Diabetes Education Record Assessment/Progress: Initial  Current Diabetes Risk Level: Moderate     Referring Provider: No ref. provider found  20 y.o. female in clinic today for diabetes education. Patient has seen an RD in the past in [place], [place] when initially started with hyperglycemia.    When pt was born, her BG was 11. She continued to have hypoglycemia and she was diagnosed with hyperinsulinemia.   Pancreas was removed at 6 mo    gtube placed at 12mo to give nighttime feedings to keep BG nimisha. She kept the gtube until 13 yo to take medications.    Pt had been seeing physicians at [facility] in [place]. Recently transferred to [facility] [place]     Weight: 93.9 kg (207 lb)   Height: 5' 3" (160 cm)   Body mass index is 36.67 kg/m².      Last A1c:   Lab Results   Component Value Date    HGBA1C 7.8 (H) 07/07/2020     Last visit with Diabetes Educator: Last Education Visit: Not Found      Diabetes Type  Diabetes Type : Type II    Diabetes History  Reviewed Problem List with Patient: Yes   Levemir, 5 units every morning   Humalog " SS:  150-199, 1 unit  200-249, 2 units  250-299, 3 units  300-349, 4 units  350-499, 5 units    After mom sent BG record and food record to [facility], Humalog dose was changed to include a base dose of 2 units for foods - This will be implemented tomorrow.     Health Maintenance was reviewed today with patient. Discussed with patient importance of routine eye exams, foot exams/foot care, blood work (i.e.: A1c, microalbumin, and lipid), dental visits, yearly flu vaccine, and pneumonia vaccine as indicated by PCP. Patient verbalized understanding.     Health Maintenance Topics with due status: Not Due       Topic Last Completion Date    Hemoglobin A1c 07/07/2020     Health Maintenance Due   Topic Date Due    Hepatitis C Screening  2000    Lipid Panel  2000    Foot Exam  09/05/2010    Eye Exam  09/05/2010    Urine Microalbumin  09/05/2010    HPV Vaccines (1 - 2-dose series) 09/05/2011    HIV Screening  09/05/2015    Chlamydia Screening  09/05/2015    TETANUS VACCINE  09/05/2018    Pneumococcal Vaccine (Medium Risk) (1 of 1 - PPSV23) 09/05/2019    Influenza Vaccine (1) 08/01/2020       Nutrition  What type of sweetener do you use?: none  What type of beverages do you drink?: diet soda/tea(drinking Diet Dr. Pepper or Diet Mountain Dew)  Meal Plan 24 Hour Recall - Breakfast: none(wakes up at 9am to take Levemir and other meds then often goes back to sleep)  Meal Plan 24 Hour Recall - Lunch: 3 chicken tenders; no drink  Meal Plan 24 Hour Recall - Dinner: [employer] chicken nuggets and French fries; Caramel Frappe  Meal Plan 24 Hour Recall - Snack: none(sometimes eats during the night - chips or left overs)    Monitoring   Self Monitoring : pt is checking BG at least tid // record in media // fasting BG remains high - 175-261, mostly in the 200's // ac BG mostly in the upper 100's to mid 200's  Blood Glucose Logs: Yes  Do you use a personal continuous glucose monitor?: No(still waiting on Dexcom)  In the  last month, how often have you had a low blood sugar reaction?: never  Can you tell when your blood sugar is too high?: no    Exercise   Exercise Type: none                    Pt was more interactive today and less stressed.                       Barriers to Change  Barriers to Change: Cognitive  Learning Challenges : Below Level for Age/Development              Diabetes Education Assessment/Progress  Diabetes Disease Process (diabetes disease process and treatment options): Not Covered/Deferred  Nutrition (Incorporating nutritional management into one's lifestyle): Discussion, Instructed, Individual Session, Demonstration, Requires Assistance Family/SO  Physical Activity (incorporating physical activity into one's lifestyle): Not Covered/Deferred  Medications (states correct name, dose, onset, peak, duration, side effects & timing of meds): Discussion, Instructed, Individual Session, Comprehends Key Points, Requires Assistance Family/SO  Monitoring (monitoring blood glucose/other parameters & using results): Discussion, Individual Session, Comprehends Key Points  Acute Complications (preventing, detecting, and treating acute complications): Not Covered/Deferred  Chronic Complications (preventing, detecting, and treating chronic complications): Discussion, Individual Session, Comprehends Key Points  Clinical (diabetes, other pertinent medical history, and relevant comorbidities reviewed during visit): Discussion, Individual Session  Cognitive (knowledge of self-management skills, functional health literacy): Discussion, Individual Session, Requires Assistance Family/SO  Psychosocial (emotional response to diabetes): Not Covered/Deferred  Diabetes Distress and Support Systems: Not Covered/Deferred  Behavioral (readiness for change, lifestyle practices, self-care behaviors): Discussion, Individual Session    Goals  Patient has selected/evaluated goals during today's session: Yes, selected  Healthy Eating: In  Progress(Pt is to avoid beverages with sugar; replace with diet soda or SF Koolaid, etc.)  Start Date: 10/20/20(If drinking a [employer] caramel frappe, pt to only drink a small, which has 60 grams of carb)  Target Date: 12/31/20  Monitoring: In Progress(Continue to check BG tid and keep log)  Medications: Set(Follow new SS for Humalog with base of 2 units - to be taken before eating carb foods)  Start Date: 10/20/20  Target Date: 12/31/20    New Humalog dose of 2 units plus the following SS:  180-230, 1 unit  231-280, 2 units  281-330, 3 units  331-380, 4 units  >380, 5 units  This will be implemented tomorrow        Diabetes Care Plan/Intervention  Education Plan/Intervention: Individual Follow-Up DSMT(f/u in 2 months virtual appt)   Will cont to make small progress with diet changes.   Today, told patient to focus on balanced meals rather than convenience foods such as chips, crackers and candy. Mom and grandmother present for visit and encouraged to provide more whole foods.     Diabetes Meal Plan  Calories: 2200, 2400  Carbohydrate Per Meal: 60-75g  Carbohydrate Per Snack : 7-15g    Today's Self-Management Care Plan was developed with the patient's input and is based on barriers identified during today's assessment.    The long and short-term goals in the care plan were written with the patient/caregiver's input. The patient has agreed to work toward these goals to improve her overall diabetes control.      The patient received a copy of today's self-management plan and verbalized understanding of the care plan, goals, and all of today's instructions.      The patient was encouraged to communicate with her physician and care team regarding her condition(s) and treatment.  I provided the patient with my contact information today and encouraged her to contact me via phone or patient portal as needed.     Education Units of Time   Time Spent: 60 min

## 2020-10-22 ENCOUNTER — TELEPHONE (OUTPATIENT)
Dept: ENDOCRINOLOGY | Facility: CLINIC | Age: 20
End: 2020-10-22

## 2020-10-23 ENCOUNTER — TELEPHONE (OUTPATIENT)
Dept: ENDOCRINOLOGY | Facility: CLINIC | Age: 20
End: 2020-10-23

## 2020-10-23 NOTE — TELEPHONE ENCOUNTER
----- Message from Olivia Shaw sent at 10/23/2020  1:07 PM CDT -----  Regarding: Dexcom  Contact: diabetes mangement supplies  diabetes mangement supplies     [facility]   [phone]  ext [phone]     Ordering doctor Dr Westbrook      CME   Chart notes  within last 6 months   testing 4 times a day and injecting 3 times a day   in notes     Please fax [phone]

## 2020-10-23 NOTE — TELEPHONE ENCOUNTER
----- Message from Praveena Edouard, RN sent at 10/23/2020  3:03 PM CDT -----  Regarding: FW: Dexcom  Contact: diabetes mangement supplies    ----- Message -----  From: Olivia Shaw  Sent: 10/23/2020   1:07 PM CDT  To: Logan Tavera Staff, Dariana BOWDEN Staff  Subject: Dexcom                                           diabetes mangement supplies     [facility]   [phone]  ext [phone]     Ordering doctor Dr Westbrook      CME   Chart notes  within last 6 months   testing 4 times a day and injecting 3 times a day   in notes     Please fax [phone]

## 2020-10-30 ENCOUNTER — PATIENT MESSAGE (OUTPATIENT)
Dept: ENDOCRINOLOGY | Facility: CLINIC | Age: 20
End: 2020-10-30

## 2020-10-30 ENCOUNTER — PATIENT MESSAGE (OUTPATIENT)
Dept: NEUROLOGY | Facility: CLINIC | Age: 20
End: 2020-10-30

## 2020-10-30 DIAGNOSIS — E16.1 HYPERINSULINISM: Primary | ICD-10-CM

## 2020-10-30 DIAGNOSIS — E03.9 HYPOTHYROIDISM, UNSPECIFIED TYPE: Primary | ICD-10-CM

## 2020-10-30 DIAGNOSIS — E16.1 HYPERINSULINISM: ICD-10-CM

## 2020-11-05 ENCOUNTER — TELEPHONE (OUTPATIENT)
Dept: ENDOCRINOLOGY | Facility: CLINIC | Age: 20
End: 2020-11-05

## 2020-11-05 NOTE — TELEPHONE ENCOUNTER
----- Message from Olivia Shaw sent at 11/5/2020 11:29 AM CST -----  Regarding: CGM  Contact: Medicaid insurance  rep  Alva  with medicaid  insurance calling       [phone]    /ref # [id number]    Reference for request for CGM      Please call

## 2020-11-09 ENCOUNTER — TELEPHONE (OUTPATIENT)
Dept: ENDOCRINOLOGY | Facility: CLINIC | Age: 20
End: 2020-11-09

## 2020-11-09 PROBLEM — E13.9 DIABETES MELLITUS SECONDARY TO PANCREATECTOMY: Status: ACTIVE | Noted: 2020-11-09

## 2020-11-09 PROBLEM — Z90.410 DIABETES MELLITUS SECONDARY TO PANCREATECTOMY: Status: ACTIVE | Noted: 2020-11-09

## 2020-11-09 PROBLEM — E89.1 DIABETES MELLITUS SECONDARY TO PANCREATECTOMY: Status: ACTIVE | Noted: 2020-11-09

## 2020-11-09 NOTE — TELEPHONE ENCOUNTER
Spoke with the patient and her mother.  Patient has impaired hypoglycemia awareness.   Patients grandmother reports a change in the patients demeanor which prompts her to check the patients glucose.   Patient has experienced overnight hypoglycemia which is caught from her mother spot checking her glucose.   See note for medication changes.

## 2020-11-09 NOTE — TELEPHONE ENCOUNTER
----- Message from Johan Grullon sent at 11/9/2020  9:23 AM CST -----  Regarding: call back      The Caller (Sterling james Physician Consultan with [place] [employer]) would like a call back about continuous glucose monitor and the caller needs more information.    This is a time sensitive request.    Phone # [phone] (Sterling)

## 2020-11-09 NOTE — TELEPHONE ENCOUNTER
Spoke with Dr. Katz and he want to speak with you in order to order her CGMS. Please call him.  Also pt may need a submit a new Rx for CGMS. Let me know if we do so .     Thank you

## 2020-11-10 ENCOUNTER — TELEPHONE (OUTPATIENT)
Dept: ENDOCRINOLOGY | Facility: CLINIC | Age: 20
End: 2020-11-10

## 2020-11-10 ENCOUNTER — PATIENT MESSAGE (OUTPATIENT)
Dept: ENDOCRINOLOGY | Facility: CLINIC | Age: 20
End: 2020-11-10

## 2020-11-10 NOTE — TELEPHONE ENCOUNTER
Attempted to call her insurance company regarding approval for her CGM. No answer. Left a detailed VM with call back information.

## 2020-11-10 NOTE — TELEPHONE ENCOUNTER
----- Message from Christy Davis sent at 11/10/2020 11:59 AM CST -----  Teri with medicaid would like to receive a call back regarding documentation that is needed for glucose monitoring supplies. Please contact medicaid to advise.    Contact info [phone] option 2 ref# [id number]

## 2020-11-10 NOTE — TELEPHONE ENCOUNTER
I called almost 3 times but no one answer call so I left  with contact no to give us call back. If you want a try .

## 2020-11-18 NOTE — PROGRESS NOTES
Subjective:      Patient ID: Davis Duenas is a 20 y.o. female.    Chief Complaint:  No chief complaint on file.    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  Previously seen by me on 10/7/2020.  This is a MyChart video visit.    The patient location is: [place]  The chief complaint leading to consultation is: DM  Visit type: Virtual visit with synchronous audio and video  Total time spent with patient: see below  Each patient to whom he or she provides medical services by telemedicine is:  (1) informed of the relationship between the physician and patient and the respective role of any other health care provider with respect to management of the patient; and (2) notified that he or she may decline to receive medical services by telemedicine and may withdraw from such care at any time.      Followed with Endocrinology in [place]. LOV 1/31/2020  Davis is a 19 y.o. female with CHI, S/P 98% panc as an infant in [facility] at the age of 6 months. She had hypoglycemia postop and so she was re-tried on Diazoxide which appear to work better at that time.    Over the course of the years she has done extremely well with almost no hypoglycemia while on Diazoxide and every time we tried to wean her off the Diazoxide she gets a recurrence of her hypoglycemia however earlier this year we are finally able to get her off Diazoxide. ( February of 2019). After 3 months her hemoglobin A1c 6.1%. Today she comes to clinic because a recent hemoglobin A1c was performed by her family doctor that was 7% and a random blood sugar was performed that was 191 mg/dL. Glucometer testing at home reveals fasting blood sugars ranging from 187-220 by glucometer. She does not have symptoms of polyuria or polydipsia or weight loss.    Thus based on this information she has random blood sugars greater than 200 with a hemoglobin A1c of 7% in the absence of symptoms indicates that she has  diabetes that is likely status post 98% pancreatectomy as 95 % of patients who have a 98% pancreatectomy will developed diabetes by age 50    Mom is present for visit.    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     10/2020  C peptide 2.7  Glucose 201  A1c 7.7%    Diagnosed:   Known complications:  DKA -  RN -  PN -  Nephropathy -  CAD -    Current regimen:  Levemir 5 units daily   Humalog 2units plus correction scale before meals  Add correction scale if needed.  Blood sugar 180 to 230 add 1 units  Blood sugar 231 to 280 add 2 units  Blood sugar 281 to 330 add 3 units  Blood sugar 331 to 380 add 4 units  Blood sugar greater than 380 add 5 units    Not missing any basal. Missing a few prandial doses.   Rotating injection sites.     Other medications tried:  None.     CGM 2020  Glucose Monitor:   4 times a day testing  Log reviewed: log provided.                  Patient has diabetes mellitus and manages diabetes with an intensive insulin regimen. The patient requires a therapeutic CGM and is willing to use therapeutic CGM for the necesary frequent adjustments of insulin therapy. Patient has been using SMBG for frequent glucose monitoring (4+ times daily). I have completed an in-person visit during the previous 6 months and will continue to have in-person visits every 6 months to assess adherence to their CGM regimen and diabetes treatment plan.    Hypoglycemia:  Denies  Knows how to correct with 15 grams of carbs- juice, coke, or a peppermint.     Diet/Exercise:  Eats 2 meals a day.   B: skips breakfast   L: chicken strips  D: cream of chicken soup  Snacks : chips   Drinks : water and diet mountain dew.  Exercise: None.      Education - last visit: 2020  Eye Exam: > 1 year ago  Podiatry: None    Diabetes Management Status    Hemoglobin A1C   Date Value Ref Range Status    07/07/2020 7.8 (H) 4.0 - 5.6 % Final     Comment:     ADA Screening Guidelines:  5.7-6.4%  Consistent with prediabetes  >or=6.5%  Consistent with diabetes  High levels of fetal hemoglobin interfere with the HbA1C  assay. Heterozygous hemoglobin variants (HbS, HgC, etc)do  not significantly interfere with this assay.   However, presence of multiple variants may affect accuracy.         Statin: Not taking  ACE/ARB: Not taking  Screening or Prevention Patient's value Goal Complete/Controlled?   HgA1C Testing and Control   Lab Results   Component Value Date    HGBA1C 7.8 (H) 07/07/2020      Annually/Less than 8% Yes   Lipid profile Most Recent Lipid Panel Health Maintenance Topic Completion: Not Found Annually No   LDL control No results found for: LDLCALC Annually/Less than 100 mg/dl  No   Nephropathy screening No results found for: LABMICR  No results found for: PROTEINUA Annually No   Blood pressure BP Readings from Last 1 Encounters:   07/07/20 126/84    Less than 140/90 Yes   Dilated retinal exam Most Recent Eye Exam Date: Not Found Annually No   Foot exam   Most Recent Foot Exam Date: Not Found Annually No     With regards to hypothyroidism:    Outside Lab   10/15/2020  TSH 5.253  Free T4 0.89    Lab Results   Component Value Date    TSH 2.815 07/07/2020     Current Medication:  LT4 75mcg daily (changed 10/16/2020)    Takes thyroid medication properly without food first thing in the morning.    Current Symptoms:   - Weight gain  + Fatigue   - Constipation   - Hair loss  - Brittle nails  - Mental fog   - cp, palpations or sob  - Heat intolerance  - Cold intolerance    Review of Systems   Constitutional: Positive for fatigue.   Eyes: Negative for visual disturbance.   Respiratory: Negative for shortness of breath.    Cardiovascular: Negative for chest pain.   Gastrointestinal: Negative for abdominal pain.   Musculoskeletal: Negative for arthralgias.   Skin: Negative for wound.   Neurological: Negative for  headaches.   Hematological: Does not bruise/bleed easily.   Psychiatric/Behavioral: Negative for sleep disturbance.     There were no vitals taken for this visit.    There is no height or weight on file to calculate BMI.    Lab Review:   Lab Results   Component Value Date    HGBA1C 7.8 (H) 2020       No results found for: CHOL, HDL, LDLCALC, TRIG, CHOLHDL  Lab Results   Component Value Date     2020    K 4.7 2020     2020    CO2 24 2020     (H) 2020    BUN 18 2020    CREATININE 0.9 2020    CALCIUM 9.4 2020    PROT 7.7 2020    ALBUMIN 3.7 2020    BILITOT 0.2 2020    ALKPHOS 109 2020    AST 12 2020    ALT 13 2020    ANIONGAP 10 2020    ESTGFRAFRICA >60.0 2020    EGFRNONAA >60.0 2020    TSH 2.815 2020     Vit D, 25-Hydroxy   Date Value Ref Range Status   2020 34 30 - 96 ng/mL Final     Comment:     Vitamin D deficiency.........<10 ng/mL                              Vitamin D insufficiency......10-29 ng/mL       Vitamin D sufficiency........> or equal to 30 ng/mL  Vitamin D toxicity............>100 ng/mL       Assessment and Plan     1. 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 Hypothyroidism, unspecified type     3. 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  Persistent hypoglycemia on Diazoxide until 2019.  Now transitioned to DM.    -- C peptide 2.7 (10/2020). Consider GLP1 given beta cell function while noting this is something we should monitor as it may decline.  -- A1c goal <7.0%.  -- Medications discussed:  Insulin   -- Reviewed logs/CGM:  Glucose is improving. No hypoglycemia  Instructed to send glucose logs in 7 days.  Reach out to me sooner for any glucose <70 or consistently >200.  -- Patient would benefit from personal  CGM.  -- Medication Changes:   CONTINUE:  Levemir 5 units daily   Humalog 2units plus correction scale before meals  Add correction scale if needed.  Blood sugar 180 to 230 add 1 units  Blood sugar 231 to 280 add 2 units  Blood sugar 281 to 330 add 3 units  Blood sugar 331 to 380 add 4 units  Blood sugar greater than 380 add 5 units  -- Reviewed goals of therapy are to get the best control we can without hypoglycemia.  -- Reviewed patient's current insulin regimen. Clarified proper insulin dose and timing in relation to meals, etc. Insulin injection sites and proper rotation instructed.    -- Advised frequent self blood glucose monitoring.  Patient encouraged to document glucose results and bring them to every clinic visit.  -- Hypoglycemia precautions discussed. Instructed on precautions before driving.    -- Call for Bg repeatedly < 90 or > 180.   -- Close adherence to lifestyle changes recommended.   -- Periodic follow ups for eye evaluations, foot care and dental care suggested.    Hypothyroidism  -- Labs scheduled - faxed to patient.  -- Medication Changes:   CONTINUE:  LT4 75mcg daily (changed 10/16/2020)  -- Clinically and biochemically euthyroid.  -- Goal is a normal TSH.  -- Avoid exogenous hyperthyroidism as this can accelerate bone loss and increase risk of CV complications.  -- Advised to take LT4 on an empty stomach with water and to wait 30-45 minutes before eating or taking other medications   -- Reviewed usual times of thyroid hormone changes  -- Reviewed that symptoms of hypothyroidism may not correlate with tsh, and a normal TSH is the goal of therapy. Symptoms are not a justification for over treatment.    Morbid obesity with BMI of 40.0-44.9, adult  -- Encouraged dietary and lifestyle modifications.  -- Emphasized weight loss goals.    No follow-ups on file.      I spent 30 minutes face-to-face with the patient, over half of the visit was spent on counseling and/or coordinating the care of the  patient.    Counseling includes:  Diagnostic results, impressions, recommendations   Prognosis   Risk and benefits of management/treatment options   Instructions for management treatment and or follow-up   Importance of compliance with management   Risk factor reduction   Patient education    Check on status of Dexcom  Patient has diabetes mellitus and manages diabetes with an intensive insulin regimen. The patient requires a therapeutic CGM and is willing to use therapeutic CGM for the necesary frequent adjustments of insulin therapy. Patient has been using SMBG for frequent glucose monitoring (4+ times daily). I have completed an in-person visit during the previous 6 months and will continue to have in-person visits every 6 months to assess adherence to their CGM regimen and diabetes treatment plan.

## 2020-11-19 ENCOUNTER — PATIENT MESSAGE (OUTPATIENT)
Dept: ENDOCRINOLOGY | Facility: CLINIC | Age: 20
End: 2020-11-19

## 2020-11-19 ENCOUNTER — OFFICE VISIT (OUTPATIENT)
Dept: ENDOCRINOLOGY | Facility: CLINIC | Age: 20
End: 2020-11-19
Payer: COMMERCIAL

## 2020-11-19 DIAGNOSIS — Z90.410 DIABETES MELLITUS SECONDARY TO PANCREATECTOMY: Primary | ICD-10-CM

## 2020-11-19 DIAGNOSIS — E66.01 MORBID OBESITY WITH BMI OF 40.0-44.9, ADULT: ICD-10-CM

## 2020-11-19 DIAGNOSIS — E89.1 DIABETES MELLITUS SECONDARY TO PANCREATECTOMY: Primary | ICD-10-CM

## 2020-11-19 DIAGNOSIS — E03.9 HYPOTHYROIDISM, UNSPECIFIED TYPE: ICD-10-CM

## 2020-11-19 DIAGNOSIS — E13.9 DIABETES MELLITUS SECONDARY TO PANCREATECTOMY: Primary | ICD-10-CM

## 2020-11-19 PROCEDURE — 99214 PR OFFICE/OUTPT VISIT, EST, LEVL IV, 30-39 MIN: ICD-10-PCS | Mod: 95,,, | Performed by: NURSE PRACTITIONER

## 2020-11-19 PROCEDURE — 99214 OFFICE O/P EST MOD 30 MIN: CPT | Mod: 95,,, | Performed by: NURSE PRACTITIONER

## 2020-11-19 NOTE — ASSESSMENT & PLAN NOTE
-- Encouraged dietary and lifestyle modifications.  -- Emphasized weight loss goals.  
-- Labs scheduled - faxed to patient.  -- Medication Changes:   CONTINUE:  LT4 75mcg daily (changed 10/16/2020)  -- Clinically and biochemically euthyroid.  -- Goal is a normal TSH.  -- Avoid exogenous hyperthyroidism as this can accelerate bone loss and increase risk of CV complications.  -- Advised to take LT4 on an empty stomach with water and to wait 30-45 minutes before eating or taking other medications   -- Reviewed usual times of thyroid hormone changes  -- Reviewed that symptoms of hypothyroidism may not correlate with tsh, and a normal TSH is the goal of therapy. Symptoms are not a justification for over treatment.  
-- S/p 98% pancreatectomy at 6months old for  hyperinsulinism hypoglycemia.  Persistent hypoglycemia on Diazoxide until 2019.  Now transitioned to DM.    -- C peptide 2.7 (10/2020). Consider GLP1 given beta cell function while noting this is something we should monitor as it may decline.  -- A1c goal <7.0%.  -- Medications discussed:  Insulin   -- Reviewed logs/CGM:  Glucose is improving. No hypoglycemia  Instructed to send glucose logs in 7 days.  Reach out to me sooner for any glucose <70 or consistently >200.  -- Patient would benefit from personal CGM.  -- Medication Changes:   CONTINUE:  Levemir 5 units daily   Humalog 2units plus correction scale before meals  Add correction scale if needed.  Blood sugar 180 to 230 add 1 units  Blood sugar 231 to 280 add 2 units  Blood sugar 281 to 330 add 3 units  Blood sugar 331 to 380 add 4 units  Blood sugar greater than 380 add 5 units  -- Reviewed goals of therapy are to get the best control we can without hypoglycemia.  -- Reviewed patient's current insulin regimen. Clarified proper insulin dose and timing in relation to meals, etc. Insulin injection sites and proper rotation instructed.    -- Advised frequent self blood glucose monitoring.  Patient encouraged to document glucose results and bring them to every clinic visit.  -- Hypoglycemia precautions discussed. Instructed on precautions before driving.    -- Call for Bg repeatedly < 90 or > 180.   -- Close adherence to lifestyle changes recommended.   -- Periodic follow ups for eye evaluations, foot care and dental care suggested.  
no ROM deficits were identified

## 2020-11-20 ENCOUNTER — PATIENT MESSAGE (OUTPATIENT)
Dept: ENDOCRINOLOGY | Facility: CLINIC | Age: 20
End: 2020-11-20

## 2020-11-22 ENCOUNTER — PATIENT MESSAGE (OUTPATIENT)
Dept: ENDOCRINOLOGY | Facility: CLINIC | Age: 20
End: 2020-11-22

## 2020-12-16 ENCOUNTER — PATIENT MESSAGE (OUTPATIENT)
Dept: ENDOCRINOLOGY | Facility: CLINIC | Age: 20
End: 2020-12-16

## 2020-12-19 ENCOUNTER — PATIENT MESSAGE (OUTPATIENT)
Dept: DIABETES | Facility: CLINIC | Age: 20
End: 2020-12-19

## 2020-12-29 ENCOUNTER — TELEPHONE (OUTPATIENT)
Dept: ENDOCRINOLOGY | Facility: CLINIC | Age: 20
End: 2020-12-29

## 2021-01-07 ENCOUNTER — PATIENT MESSAGE (OUTPATIENT)
Dept: NEUROLOGY | Facility: CLINIC | Age: 21
End: 2021-01-07

## 2021-01-07 RX ORDER — LACOSAMIDE 150 MG/1
300 TABLET, FILM COATED ORAL 2 TIMES DAILY
Qty: 360 TABLET | Refills: 5 | Status: SHIPPED | OUTPATIENT
Start: 2021-01-07 | End: 2021-07-22 | Stop reason: SDUPTHER

## 2021-01-07 RX ORDER — LACOSAMIDE 150 MG/1
300 TABLET, FILM COATED ORAL 2 TIMES DAILY
Qty: 120 TABLET | Refills: 11 | OUTPATIENT
Start: 2021-01-07 | End: 2022-01-07

## 2021-01-22 ENCOUNTER — TELEPHONE (OUTPATIENT)
Dept: ENDOCRINOLOGY | Facility: CLINIC | Age: 21
End: 2021-01-22

## 2021-04-28 ENCOUNTER — PATIENT MESSAGE (OUTPATIENT)
Dept: NEUROLOGY | Facility: CLINIC | Age: 21
End: 2021-04-28

## 2021-04-28 ENCOUNTER — PATIENT MESSAGE (OUTPATIENT)
Dept: ENDOCRINOLOGY | Facility: CLINIC | Age: 21
End: 2021-04-28

## 2021-04-29 DIAGNOSIS — E03.9 HYPOTHYROIDISM, UNSPECIFIED TYPE: Primary | ICD-10-CM

## 2021-04-29 RX ORDER — LEVOTHYROXINE SODIUM 75 UG/1
75 TABLET ORAL
Qty: 30 TABLET | Refills: 11 | Status: CANCELLED | OUTPATIENT
Start: 2021-04-29 | End: 2022-04-29

## 2021-05-03 ENCOUNTER — PATIENT MESSAGE (OUTPATIENT)
Dept: ENDOCRINOLOGY | Facility: CLINIC | Age: 21
End: 2021-05-03

## 2021-05-03 DIAGNOSIS — E89.1 DIABETES MELLITUS SECONDARY TO PANCREATECTOMY: Primary | ICD-10-CM

## 2021-05-03 DIAGNOSIS — E03.9 HYPOTHYROIDISM, UNSPECIFIED TYPE: ICD-10-CM

## 2021-05-03 DIAGNOSIS — Z90.410 DIABETES MELLITUS SECONDARY TO PANCREATECTOMY: Primary | ICD-10-CM

## 2021-05-03 DIAGNOSIS — E13.9 DIABETES MELLITUS SECONDARY TO PANCREATECTOMY: Primary | ICD-10-CM

## 2021-05-04 ENCOUNTER — PATIENT MESSAGE (OUTPATIENT)
Dept: ENDOCRINOLOGY | Facility: CLINIC | Age: 21
End: 2021-05-04

## 2021-05-04 RX ORDER — LEVOTHYROXINE SODIUM 75 UG/1
75 TABLET ORAL
Qty: 30 TABLET | Refills: 6 | Status: SHIPPED | OUTPATIENT
Start: 2021-05-04 | End: 2021-11-01 | Stop reason: SDUPTHER

## 2021-05-10 ENCOUNTER — PATIENT MESSAGE (OUTPATIENT)
Dept: ENDOCRINOLOGY | Facility: CLINIC | Age: 21
End: 2021-05-10

## 2021-05-10 ENCOUNTER — TELEPHONE (OUTPATIENT)
Dept: ENDOCRINOLOGY | Facility: CLINIC | Age: 21
End: 2021-05-10

## 2021-05-12 ENCOUNTER — PATIENT MESSAGE (OUTPATIENT)
Dept: RESEARCH | Facility: HOSPITAL | Age: 21
End: 2021-05-12

## 2021-06-07 ENCOUNTER — PATIENT MESSAGE (OUTPATIENT)
Dept: ENDOCRINOLOGY | Facility: CLINIC | Age: 21
End: 2021-06-07

## 2021-06-07 ENCOUNTER — OFFICE VISIT (OUTPATIENT)
Dept: NEUROLOGY | Facility: CLINIC | Age: 21
End: 2021-06-07
Payer: MEDICAID

## 2021-06-07 ENCOUNTER — LAB VISIT (OUTPATIENT)
Dept: LAB | Facility: HOSPITAL | Age: 21
End: 2021-06-07
Attending: NURSE PRACTITIONER
Payer: MEDICAID

## 2021-06-07 VITALS
RESPIRATION RATE: 16 BRPM | HEIGHT: 63 IN | HEART RATE: 72 BPM | WEIGHT: 189.38 LBS | DIASTOLIC BLOOD PRESSURE: 76 MMHG | SYSTOLIC BLOOD PRESSURE: 118 MMHG | OXYGEN SATURATION: 99 % | BODY MASS INDEX: 33.55 KG/M2

## 2021-06-07 DIAGNOSIS — H92.03 EAR PAIN, BILATERAL: ICD-10-CM

## 2021-06-07 DIAGNOSIS — G40.109 EPILEPSY, LOCALIZATION-RELATED: ICD-10-CM

## 2021-06-07 DIAGNOSIS — E13.9 DIABETES MELLITUS SECONDARY TO PANCREATECTOMY: ICD-10-CM

## 2021-06-07 DIAGNOSIS — Z90.410 DIABETES MELLITUS SECONDARY TO PANCREATECTOMY: ICD-10-CM

## 2021-06-07 DIAGNOSIS — R42 DIZZINESS AND GIDDINESS: ICD-10-CM

## 2021-06-07 DIAGNOSIS — G44.221 CHRONIC TENSION-TYPE HEADACHE, INTRACTABLE: ICD-10-CM

## 2021-06-07 DIAGNOSIS — E89.1 DIABETES MELLITUS SECONDARY TO PANCREATECTOMY: ICD-10-CM

## 2021-06-07 DIAGNOSIS — H93.19 TINNITUS, UNSPECIFIED LATERALITY: ICD-10-CM

## 2021-06-07 DIAGNOSIS — G40.109 EPILEPSY, LOCALIZATION-RELATED: Primary | ICD-10-CM

## 2021-06-07 LAB
BASOPHILS # BLD AUTO: 0.02 K/UL (ref 0–0.2)
BASOPHILS NFR BLD: 0.3 % (ref 0–1.9)
DIFFERENTIAL METHOD: ABNORMAL
EOSINOPHIL # BLD AUTO: 0.1 K/UL (ref 0–0.5)
EOSINOPHIL NFR BLD: 2 % (ref 0–8)
ERYTHROCYTE [DISTWIDTH] IN BLOOD BY AUTOMATED COUNT: 11.9 % (ref 11.5–14.5)
HCT VFR BLD AUTO: 42.4 % (ref 37–48.5)
HGB BLD-MCNC: 14.2 G/DL (ref 12–16)
IMM GRANULOCYTES # BLD AUTO: 0.02 K/UL (ref 0–0.04)
IMM GRANULOCYTES NFR BLD AUTO: 0.3 % (ref 0–0.5)
LYMPHOCYTES # BLD AUTO: 1.9 K/UL (ref 1–4.8)
LYMPHOCYTES NFR BLD: 29.4 % (ref 18–48)
MCH RBC QN AUTO: 32.3 PG (ref 27–31)
MCHC RBC AUTO-ENTMCNC: 33.5 G/DL (ref 32–36)
MCV RBC AUTO: 97 FL (ref 82–98)
MONOCYTES # BLD AUTO: 0.6 K/UL (ref 0.3–1)
MONOCYTES NFR BLD: 9 % (ref 4–15)
NEUTROPHILS # BLD AUTO: 3.9 K/UL (ref 1.8–7.7)
NEUTROPHILS NFR BLD: 59 % (ref 38–73)
NRBC BLD-RTO: 0 /100 WBC
PLATELET # BLD AUTO: 183 K/UL (ref 150–450)
PMV BLD AUTO: 11.9 FL (ref 9.2–12.9)
RBC # BLD AUTO: 4.39 M/UL (ref 4–5.4)
WBC # BLD AUTO: 6.54 K/UL (ref 3.9–12.7)

## 2021-06-07 PROCEDURE — 80053 COMPREHEN METABOLIC PANEL: CPT | Performed by: NURSE PRACTITIONER

## 2021-06-07 PROCEDURE — 85025 COMPLETE CBC W/AUTO DIFF WBC: CPT | Performed by: NURSE PRACTITIONER

## 2021-06-07 PROCEDURE — 36415 COLL VENOUS BLD VENIPUNCTURE: CPT | Performed by: NURSE PRACTITIONER

## 2021-06-07 PROCEDURE — 99417 PR PROLONGED SVC, OUTPT, W/WO DIRECT PT CONTACT,  EA ADDTL 15 MIN: ICD-10-PCS | Mod: S$PBB,,, | Performed by: NURSE PRACTITIONER

## 2021-06-07 PROCEDURE — 99215 OFFICE O/P EST HI 40 MIN: CPT | Mod: S$PBB,,, | Performed by: NURSE PRACTITIONER

## 2021-06-07 PROCEDURE — 99215 PR OFFICE/OUTPT VISIT, EST, LEVL V, 40-54 MIN: ICD-10-PCS | Mod: S$PBB,,, | Performed by: NURSE PRACTITIONER

## 2021-06-07 PROCEDURE — 80235 DRUG ASSAY LACOSAMIDE: CPT | Performed by: NURSE PRACTITIONER

## 2021-06-07 PROCEDURE — 99999 PR PBB SHADOW E&M-EST. PATIENT-LVL V: CPT | Mod: PBBFAC,,, | Performed by: NURSE PRACTITIONER

## 2021-06-07 PROCEDURE — 99215 OFFICE O/P EST HI 40 MIN: CPT | Mod: PBBFAC | Performed by: NURSE PRACTITIONER

## 2021-06-07 PROCEDURE — 99999 PR PBB SHADOW E&M-EST. PATIENT-LVL V: ICD-10-PCS | Mod: PBBFAC,,, | Performed by: NURSE PRACTITIONER

## 2021-06-07 PROCEDURE — 80183 DRUG SCRN QUANT OXCARBAZEPIN: CPT | Performed by: NURSE PRACTITIONER

## 2021-06-07 PROCEDURE — 99417 PROLNG OP E/M EACH 15 MIN: CPT | Mod: S$PBB,,, | Performed by: NURSE PRACTITIONER

## 2021-06-07 RX ORDER — DIVALPROEX SODIUM 500 MG/1
500 TABLET, DELAYED RELEASE ORAL EVERY 12 HOURS
Qty: 60 TABLET | Refills: 11 | Status: SHIPPED | OUTPATIENT
Start: 2021-06-07 | End: 2021-07-22 | Stop reason: SDUPTHER

## 2021-06-08 ENCOUNTER — PATIENT MESSAGE (OUTPATIENT)
Dept: NEUROLOGY | Facility: CLINIC | Age: 21
End: 2021-06-08

## 2021-06-08 LAB
ALBUMIN SERPL BCP-MCNC: 3.9 G/DL (ref 3.5–5.2)
ALP SERPL-CCNC: 102 U/L (ref 55–135)
ALT SERPL W/O P-5'-P-CCNC: 14 U/L (ref 10–44)
ANION GAP SERPL CALC-SCNC: 12 MMOL/L (ref 8–16)
AST SERPL-CCNC: 14 U/L (ref 10–40)
BILIRUB SERPL-MCNC: 0.3 MG/DL (ref 0.1–1)
BUN SERPL-MCNC: 13 MG/DL (ref 6–20)
CALCIUM SERPL-MCNC: 9.8 MG/DL (ref 8.7–10.5)
CHLORIDE SERPL-SCNC: 102 MMOL/L (ref 95–110)
CO2 SERPL-SCNC: 24 MMOL/L (ref 23–29)
CREAT SERPL-MCNC: 1 MG/DL (ref 0.5–1.4)
EST. GFR  (AFRICAN AMERICAN): >60 ML/MIN/1.73 M^2
EST. GFR  (NON AFRICAN AMERICAN): >60 ML/MIN/1.73 M^2
GLUCOSE SERPL-MCNC: 259 MG/DL (ref 70–110)
POTASSIUM SERPL-SCNC: 4 MMOL/L (ref 3.5–5.1)
PROT SERPL-MCNC: 8.1 G/DL (ref 6–8.4)
SODIUM SERPL-SCNC: 138 MMOL/L (ref 136–145)

## 2021-06-09 LAB — LACOSAMIDE: 11.4 MCG/ML (ref 1–10)

## 2021-06-10 LAB — OXCARBAZEPINE METABOLITE: 21 MCG/ML (ref 10–35)

## 2021-06-14 ENCOUNTER — TELEPHONE (OUTPATIENT)
Dept: ENDOCRINOLOGY | Facility: CLINIC | Age: 21
End: 2021-06-14

## 2021-06-14 ENCOUNTER — OFFICE VISIT (OUTPATIENT)
Dept: ENDOCRINOLOGY | Facility: CLINIC | Age: 21
End: 2021-06-14
Payer: MEDICAID

## 2021-06-14 DIAGNOSIS — E03.9 HYPOTHYROIDISM, UNSPECIFIED TYPE: ICD-10-CM

## 2021-06-14 DIAGNOSIS — E13.9 DIABETES MELLITUS SECONDARY TO PANCREATECTOMY: Primary | ICD-10-CM

## 2021-06-14 DIAGNOSIS — Z90.410 DIABETES MELLITUS SECONDARY TO PANCREATECTOMY: Primary | ICD-10-CM

## 2021-06-14 DIAGNOSIS — E89.1 DIABETES MELLITUS SECONDARY TO PANCREATECTOMY: Primary | ICD-10-CM

## 2021-06-14 DIAGNOSIS — E11.649 HYPOGLYCEMIA UNAWARENESS ASSOCIATED WITH TYPE 2 DIABETES MELLITUS: ICD-10-CM

## 2021-06-14 PROCEDURE — 99214 PR OFFICE/OUTPT VISIT, EST, LEVL IV, 30-39 MIN: ICD-10-PCS | Mod: 95,,, | Performed by: NURSE PRACTITIONER

## 2021-06-14 PROCEDURE — 99214 OFFICE O/P EST MOD 30 MIN: CPT | Mod: 95,,, | Performed by: NURSE PRACTITIONER

## 2021-06-15 ENCOUNTER — PATIENT MESSAGE (OUTPATIENT)
Dept: NEUROLOGY | Facility: CLINIC | Age: 21
End: 2021-06-15

## 2021-06-16 ENCOUNTER — LAB VISIT (OUTPATIENT)
Dept: LAB | Facility: HOSPITAL | Age: 21
End: 2021-06-16
Attending: NURSE PRACTITIONER
Payer: MEDICAID

## 2021-06-16 ENCOUNTER — TELEPHONE (OUTPATIENT)
Dept: NEUROLOGY | Facility: CLINIC | Age: 21
End: 2021-06-16

## 2021-06-16 DIAGNOSIS — Z90.410 DIABETES MELLITUS SECONDARY TO PANCREATECTOMY: ICD-10-CM

## 2021-06-16 DIAGNOSIS — E89.1 DIABETES MELLITUS SECONDARY TO PANCREATECTOMY: ICD-10-CM

## 2021-06-16 DIAGNOSIS — E03.9 HYPOTHYROIDISM, UNSPECIFIED TYPE: ICD-10-CM

## 2021-06-16 DIAGNOSIS — E13.9 DIABETES MELLITUS SECONDARY TO PANCREATECTOMY: ICD-10-CM

## 2021-06-16 LAB
ESTIMATED AVG GLUCOSE: 275 MG/DL (ref 68–131)
HBA1C MFR BLD: 11.2 % (ref 4–5.6)

## 2021-06-16 PROCEDURE — 83036 HEMOGLOBIN GLYCOSYLATED A1C: CPT | Performed by: NURSE PRACTITIONER

## 2021-06-16 PROCEDURE — 80053 COMPREHEN METABOLIC PANEL: CPT | Performed by: NURSE PRACTITIONER

## 2021-06-16 PROCEDURE — 84443 ASSAY THYROID STIM HORMONE: CPT | Performed by: NURSE PRACTITIONER

## 2021-06-16 PROCEDURE — 84681 ASSAY OF C-PEPTIDE: CPT | Performed by: NURSE PRACTITIONER

## 2021-06-16 PROCEDURE — 36415 COLL VENOUS BLD VENIPUNCTURE: CPT | Performed by: NURSE PRACTITIONER

## 2021-06-17 ENCOUNTER — PATIENT MESSAGE (OUTPATIENT)
Dept: ENDOCRINOLOGY | Facility: CLINIC | Age: 21
End: 2021-06-17

## 2021-06-17 LAB
ALBUMIN SERPL BCP-MCNC: 4 G/DL (ref 3.5–5.2)
ALP SERPL-CCNC: 94 U/L (ref 55–135)
ALT SERPL W/O P-5'-P-CCNC: 11 U/L (ref 10–44)
ANION GAP SERPL CALC-SCNC: 14 MMOL/L (ref 8–16)
AST SERPL-CCNC: 12 U/L (ref 10–40)
BILIRUB SERPL-MCNC: 0.4 MG/DL (ref 0.1–1)
BUN SERPL-MCNC: 17 MG/DL (ref 6–20)
C PEPTIDE SERPL-MCNC: 1.6 NG/ML (ref 0.78–5.19)
CALCIUM SERPL-MCNC: 10 MG/DL (ref 8.7–10.5)
CHLORIDE SERPL-SCNC: 101 MMOL/L (ref 95–110)
CO2 SERPL-SCNC: 23 MMOL/L (ref 23–29)
CREAT SERPL-MCNC: 0.9 MG/DL (ref 0.5–1.4)
EST. GFR  (AFRICAN AMERICAN): >60 ML/MIN/1.73 M^2
EST. GFR  (NON AFRICAN AMERICAN): >60 ML/MIN/1.73 M^2
GLUCOSE SERPL-MCNC: 352 MG/DL (ref 70–110)
POTASSIUM SERPL-SCNC: 4.6 MMOL/L (ref 3.5–5.1)
PROT SERPL-MCNC: 7.8 G/DL (ref 6–8.4)
SODIUM SERPL-SCNC: 138 MMOL/L (ref 136–145)
TSH SERPL DL<=0.005 MIU/L-ACNC: 2.81 UIU/ML (ref 0.4–4)

## 2021-06-22 ENCOUNTER — PATIENT MESSAGE (OUTPATIENT)
Dept: NEUROLOGY | Facility: CLINIC | Age: 21
End: 2021-06-22

## 2021-06-25 ENCOUNTER — HOSPITAL ENCOUNTER (OUTPATIENT)
Dept: RADIOLOGY | Facility: HOSPITAL | Age: 21
Discharge: HOME | End: 2021-06-25
Attending: NURSE PRACTITIONER
Payer: MEDICAID

## 2021-06-25 DIAGNOSIS — H92.03 EAR PAIN, BILATERAL: ICD-10-CM

## 2021-06-25 DIAGNOSIS — R42 DIZZINESS AND GIDDINESS: ICD-10-CM

## 2021-06-25 DIAGNOSIS — E89.1 DIABETES MELLITUS SECONDARY TO PANCREATECTOMY: ICD-10-CM

## 2021-06-25 DIAGNOSIS — G40.109 EPILEPSY, LOCALIZATION-RELATED: ICD-10-CM

## 2021-06-25 DIAGNOSIS — E13.9 DIABETES MELLITUS SECONDARY TO PANCREATECTOMY: ICD-10-CM

## 2021-06-25 DIAGNOSIS — H93.19 TINNITUS, UNSPECIFIED LATERALITY: ICD-10-CM

## 2021-06-25 DIAGNOSIS — Z90.410 DIABETES MELLITUS SECONDARY TO PANCREATECTOMY: ICD-10-CM

## 2021-06-25 PROCEDURE — 70551 MRI BRAIN STEM W/O DYE: CPT | Mod: TC

## 2021-06-29 ENCOUNTER — PATIENT MESSAGE (OUTPATIENT)
Dept: ENDOCRINOLOGY | Facility: CLINIC | Age: 21
End: 2021-06-29

## 2021-06-29 DIAGNOSIS — E16.1 HYPERINSULINISM: ICD-10-CM

## 2021-07-11 ENCOUNTER — PATIENT MESSAGE (OUTPATIENT)
Dept: ENDOCRINOLOGY | Facility: CLINIC | Age: 21
End: 2021-07-11

## 2021-07-12 RX ORDER — INSULIN LISPRO 100 [IU]/ML
2 INJECTION, SOLUTION INTRAVENOUS; SUBCUTANEOUS
Qty: 3 ML | Refills: 6 | Status: SHIPPED | OUTPATIENT
Start: 2021-07-12 | End: 2022-08-10

## 2021-07-12 RX ORDER — INSULIN DETEMIR 100 [IU]/ML
5 INJECTION, SOLUTION SUBCUTANEOUS DAILY
Qty: 1.5 ML | Refills: 3 | Status: SHIPPED | OUTPATIENT
Start: 2021-07-12 | End: 2022-08-10 | Stop reason: SDUPTHER

## 2021-07-15 ENCOUNTER — PATIENT MESSAGE (OUTPATIENT)
Dept: ENDOCRINOLOGY | Facility: CLINIC | Age: 21
End: 2021-07-15

## 2021-07-22 ENCOUNTER — PATIENT MESSAGE (OUTPATIENT)
Dept: NEUROLOGY | Facility: CLINIC | Age: 21
End: 2021-07-22

## 2021-07-22 DIAGNOSIS — R42 DIZZINESS AND GIDDINESS: ICD-10-CM

## 2021-07-22 DIAGNOSIS — Z90.410 DIABETES MELLITUS SECONDARY TO PANCREATECTOMY: ICD-10-CM

## 2021-07-22 DIAGNOSIS — H92.03 EAR PAIN, BILATERAL: ICD-10-CM

## 2021-07-22 DIAGNOSIS — G44.221 CHRONIC TENSION-TYPE HEADACHE, INTRACTABLE: ICD-10-CM

## 2021-07-22 DIAGNOSIS — H93.19 TINNITUS, UNSPECIFIED LATERALITY: ICD-10-CM

## 2021-07-22 DIAGNOSIS — G40.109 EPILEPSY, LOCALIZATION-RELATED: ICD-10-CM

## 2021-07-22 DIAGNOSIS — E13.9 DIABETES MELLITUS SECONDARY TO PANCREATECTOMY: ICD-10-CM

## 2021-07-22 DIAGNOSIS — E89.1 DIABETES MELLITUS SECONDARY TO PANCREATECTOMY: ICD-10-CM

## 2021-07-22 RX ORDER — DIVALPROEX SODIUM 500 MG/1
500 TABLET, DELAYED RELEASE ORAL EVERY 12 HOURS
Qty: 60 TABLET | Refills: 11 | Status: SHIPPED | OUTPATIENT
Start: 2021-07-22 | End: 2021-11-29 | Stop reason: SDUPTHER

## 2021-07-22 RX ORDER — LACOSAMIDE 150 MG/1
300 TABLET ORAL 2 TIMES DAILY
Qty: 360 TABLET | Refills: 5 | Status: CANCELLED | OUTPATIENT
Start: 2021-07-22 | End: 2022-07-22

## 2021-07-22 RX ORDER — OXCARBAZEPINE 300 MG/1
600 TABLET, FILM COATED ORAL 2 TIMES DAILY
Qty: 120 TABLET | Refills: 5 | OUTPATIENT
Start: 2021-07-22

## 2021-07-22 RX ORDER — LACOSAMIDE 150 MG/1
300 TABLET ORAL 2 TIMES DAILY
Qty: 360 TABLET | Refills: 5 | Status: SHIPPED | OUTPATIENT
Start: 2021-07-22 | End: 2021-09-01 | Stop reason: SDUPTHER

## 2021-07-30 ENCOUNTER — TELEPHONE (OUTPATIENT)
Dept: NEUROLOGY | Facility: CLINIC | Age: 21
End: 2021-07-30

## 2021-08-02 ENCOUNTER — OFFICE VISIT (OUTPATIENT)
Dept: NEUROLOGY | Facility: CLINIC | Age: 21
End: 2021-08-02
Payer: MEDICAID

## 2021-08-02 DIAGNOSIS — G40.109 EPILEPSY, LOCALIZATION-RELATED: Primary | ICD-10-CM

## 2021-08-02 DIAGNOSIS — H92.03 EAR PAIN, BILATERAL: ICD-10-CM

## 2021-08-02 DIAGNOSIS — H93.19 TINNITUS, UNSPECIFIED LATERALITY: ICD-10-CM

## 2021-08-02 DIAGNOSIS — R42 DIZZINESS AND GIDDINESS: ICD-10-CM

## 2021-08-02 DIAGNOSIS — R53.83 FATIGUE, UNSPECIFIED TYPE: ICD-10-CM

## 2021-08-02 DIAGNOSIS — E89.1 DIABETES MELLITUS SECONDARY TO PANCREATECTOMY: ICD-10-CM

## 2021-08-02 DIAGNOSIS — G44.221 CHRONIC TENSION-TYPE HEADACHE, INTRACTABLE: ICD-10-CM

## 2021-08-02 DIAGNOSIS — Z90.410 DIABETES MELLITUS SECONDARY TO PANCREATECTOMY: ICD-10-CM

## 2021-08-02 DIAGNOSIS — E11.649 HYPOGLYCEMIA UNAWARENESS ASSOCIATED WITH TYPE 2 DIABETES MELLITUS: ICD-10-CM

## 2021-08-02 DIAGNOSIS — E16.1 HYPERINSULINISM: ICD-10-CM

## 2021-08-02 DIAGNOSIS — E03.9 HYPOTHYROIDISM, UNSPECIFIED TYPE: ICD-10-CM

## 2021-08-02 DIAGNOSIS — E66.01 MORBID OBESITY WITH BMI OF 40.0-44.9, ADULT: ICD-10-CM

## 2021-08-02 DIAGNOSIS — G40.802 EPILEPSY WITH BOTH GENERALIZED AND FOCAL FEATURES: ICD-10-CM

## 2021-08-02 DIAGNOSIS — E13.9 DIABETES MELLITUS SECONDARY TO PANCREATECTOMY: ICD-10-CM

## 2021-08-02 PROCEDURE — 99215 OFFICE O/P EST HI 40 MIN: CPT | Mod: 95,,, | Performed by: NURSE PRACTITIONER

## 2021-08-02 PROCEDURE — 99215 PR OFFICE/OUTPT VISIT, EST, LEVL V, 40-54 MIN: ICD-10-PCS | Mod: 95,,, | Performed by: NURSE PRACTITIONER

## 2021-08-10 ENCOUNTER — PATIENT MESSAGE (OUTPATIENT)
Dept: ENDOCRINOLOGY | Facility: CLINIC | Age: 21
End: 2021-08-10

## 2021-08-31 ENCOUNTER — PATIENT MESSAGE (OUTPATIENT)
Dept: NEUROLOGY | Facility: CLINIC | Age: 21
End: 2021-08-31

## 2021-09-01 RX ORDER — LACOSAMIDE 150 MG/1
300 TABLET ORAL 2 TIMES DAILY
Qty: 360 TABLET | Refills: 5 | Status: SHIPPED | OUTPATIENT
Start: 2021-09-01 | End: 2021-11-29 | Stop reason: SDUPTHER

## 2021-09-07 RX ORDER — PEN NEEDLE, DIABETIC 31 GX5/16"
NEEDLE, DISPOSABLE MISCELLANEOUS
COMMUNITY
Start: 2021-07-12 | End: 2021-09-07 | Stop reason: SDUPTHER

## 2021-09-08 RX ORDER — PEN NEEDLE, DIABETIC 31 GX5/16"
1 NEEDLE, DISPOSABLE MISCELLANEOUS 4 TIMES DAILY
Qty: 200 EACH | Refills: 3 | Status: SHIPPED | OUTPATIENT
Start: 2021-09-08 | End: 2022-02-22 | Stop reason: SDUPTHER

## 2021-10-27 ENCOUNTER — PATIENT MESSAGE (OUTPATIENT)
Dept: NEUROLOGY | Facility: CLINIC | Age: 21
End: 2021-10-27
Payer: MEDICAID

## 2021-10-27 DIAGNOSIS — G40.109 EPILEPSY, LOCALIZATION-RELATED: Primary | ICD-10-CM

## 2021-10-28 ENCOUNTER — PATIENT MESSAGE (OUTPATIENT)
Dept: NEUROLOGY | Facility: CLINIC | Age: 21
End: 2021-10-28
Payer: MEDICAID

## 2021-11-01 ENCOUNTER — PATIENT MESSAGE (OUTPATIENT)
Dept: ENDOCRINOLOGY | Facility: CLINIC | Age: 21
End: 2021-11-01
Payer: MEDICAID

## 2021-11-01 ENCOUNTER — PATIENT MESSAGE (OUTPATIENT)
Dept: NEUROLOGY | Facility: CLINIC | Age: 21
End: 2021-11-01
Payer: MEDICAID

## 2021-11-01 ENCOUNTER — HOSPITAL ENCOUNTER (OUTPATIENT)
Dept: PULMONOLOGY | Facility: HOSPITAL | Age: 21
Discharge: HOME | End: 2021-11-01
Attending: INTERNAL MEDICINE
Payer: MEDICAID

## 2021-11-01 DIAGNOSIS — G40.109 EPILEPSY, LOCALIZATION-RELATED: ICD-10-CM

## 2021-11-01 DIAGNOSIS — E03.9 HYPOTHYROIDISM, UNSPECIFIED TYPE: Primary | ICD-10-CM

## 2021-11-01 PROCEDURE — 95819 EEG AWAKE AND ASLEEP: CPT

## 2021-11-01 PROCEDURE — 95819 EEG AWAKE AND ASLEEP: CPT | Mod: 26,,, | Performed by: PSYCHIATRY & NEUROLOGY

## 2021-11-01 PROCEDURE — 95819 PR EEG,W/AWAKE & ASLEEP RECORD: ICD-10-PCS | Mod: 26,,, | Performed by: PSYCHIATRY & NEUROLOGY

## 2021-11-01 RX ORDER — LEVOTHYROXINE SODIUM 75 UG/1
75 TABLET ORAL
Qty: 30 TABLET | Refills: 6 | Status: SHIPPED | OUTPATIENT
Start: 2021-11-01 | End: 2022-05-10 | Stop reason: SDUPTHER

## 2021-11-01 RX ORDER — LEVOTHYROXINE SODIUM 75 UG/1
75 TABLET ORAL
Qty: 30 TABLET | Refills: 6 | Status: SHIPPED | OUTPATIENT
Start: 2021-11-01 | End: 2021-11-01 | Stop reason: SDUPTHER

## 2021-11-02 ENCOUNTER — TELEPHONE (OUTPATIENT)
Dept: NEUROLOGY | Facility: CLINIC | Age: 21
End: 2021-11-02
Payer: MEDICAID

## 2021-11-02 DIAGNOSIS — E03.9 HYPOTHYROIDISM, UNSPECIFIED TYPE: ICD-10-CM

## 2021-11-02 RX ORDER — LEVOTHYROXINE SODIUM 75 UG/1
75 TABLET ORAL
Qty: 30 TABLET | Refills: 6 | OUTPATIENT
Start: 2021-11-02 | End: 2022-11-02

## 2021-11-04 ENCOUNTER — TELEPHONE (OUTPATIENT)
Dept: NEUROLOGY | Facility: CLINIC | Age: 21
End: 2021-11-04
Payer: MEDICAID

## 2021-11-04 DIAGNOSIS — G40.109 EPILEPSY, LOCALIZATION-RELATED: Primary | ICD-10-CM

## 2021-11-29 ENCOUNTER — PATIENT MESSAGE (OUTPATIENT)
Dept: NEUROLOGY | Facility: CLINIC | Age: 21
End: 2021-11-29
Payer: MEDICAID

## 2021-11-29 DIAGNOSIS — E13.9 DIABETES MELLITUS SECONDARY TO PANCREATECTOMY: ICD-10-CM

## 2021-11-29 DIAGNOSIS — R42 DIZZINESS AND GIDDINESS: ICD-10-CM

## 2021-11-29 DIAGNOSIS — G44.221 CHRONIC TENSION-TYPE HEADACHE, INTRACTABLE: ICD-10-CM

## 2021-11-29 DIAGNOSIS — H93.19 TINNITUS, UNSPECIFIED LATERALITY: ICD-10-CM

## 2021-11-29 DIAGNOSIS — G40.109 EPILEPSY, LOCALIZATION-RELATED: ICD-10-CM

## 2021-11-29 DIAGNOSIS — H92.03 EAR PAIN, BILATERAL: ICD-10-CM

## 2021-11-29 DIAGNOSIS — E89.1 DIABETES MELLITUS SECONDARY TO PANCREATECTOMY: ICD-10-CM

## 2021-11-29 DIAGNOSIS — Z90.410 DIABETES MELLITUS SECONDARY TO PANCREATECTOMY: ICD-10-CM

## 2021-11-29 RX ORDER — OXCARBAZEPINE 300 MG/1
600 TABLET, FILM COATED ORAL 2 TIMES DAILY
Qty: 120 TABLET | Refills: 5 | Status: SHIPPED | OUTPATIENT
Start: 2021-11-29 | End: 2022-02-03 | Stop reason: SDUPTHER

## 2021-11-29 RX ORDER — DIVALPROEX SODIUM 500 MG/1
500 TABLET, DELAYED RELEASE ORAL EVERY 12 HOURS
Qty: 60 TABLET | Refills: 11 | Status: SHIPPED | OUTPATIENT
Start: 2021-11-29 | End: 2022-02-03 | Stop reason: SDUPTHER

## 2021-11-29 RX ORDER — LACOSAMIDE 150 MG/1
300 TABLET ORAL 2 TIMES DAILY
Qty: 360 TABLET | Refills: 5 | Status: SHIPPED | OUTPATIENT
Start: 2021-11-29 | End: 2022-02-03 | Stop reason: SDUPTHER

## 2022-02-01 ENCOUNTER — TELEPHONE (OUTPATIENT)
Dept: NEUROLOGY | Facility: CLINIC | Age: 22
End: 2022-02-01
Payer: MEDICAID

## 2022-02-01 NOTE — TELEPHONE ENCOUNTER
Spoke with pt mother in regards to appt, advise her that the next available wouldn't be until around July. She did decided to go ahead and keep current appt date for 2/3/22.

## 2022-02-01 NOTE — TELEPHONE ENCOUNTER
----- Message from Zeina Mclain sent at 2/1/2022  9:52 AM CST -----  .Type:  Sooner Apoointment Request    Caller is requesting a sooner appointment.  Caller declined first available appointment listed below.  Caller will not accept being placed on the waitlist and is requesting a message be sent to doctor.  Name of Caller:pt   When is the first available appointment?  Symptoms:sz  Would the patient rather a call back or a response via [facility]? Both   Best Call Back Number:.[phone]    Additional Information: pt just started new job and wants to know soonest appt . Call pt back

## 2022-02-03 ENCOUNTER — OFFICE VISIT (OUTPATIENT)
Dept: NEUROLOGY | Facility: CLINIC | Age: 22
End: 2022-02-03
Payer: MEDICAID

## 2022-02-03 VITALS
HEIGHT: 66 IN | SYSTOLIC BLOOD PRESSURE: 120 MMHG | BODY MASS INDEX: 28.21 KG/M2 | RESPIRATION RATE: 16 BRPM | WEIGHT: 175.5 LBS | DIASTOLIC BLOOD PRESSURE: 74 MMHG

## 2022-02-03 DIAGNOSIS — G40.919 REFRACTORY EPILEPSY: Primary | ICD-10-CM

## 2022-02-03 DIAGNOSIS — E13.9 DIABETES MELLITUS SECONDARY TO PANCREATECTOMY: ICD-10-CM

## 2022-02-03 DIAGNOSIS — E89.1 DIABETES MELLITUS SECONDARY TO PANCREATECTOMY: ICD-10-CM

## 2022-02-03 DIAGNOSIS — R42 DIZZINESS AND GIDDINESS: ICD-10-CM

## 2022-02-03 DIAGNOSIS — F79 INTELLECTUAL DISABILITY: ICD-10-CM

## 2022-02-03 DIAGNOSIS — Z90.410 DIABETES MELLITUS SECONDARY TO PANCREATECTOMY: ICD-10-CM

## 2022-02-03 DIAGNOSIS — G44.221 CHRONIC TENSION-TYPE HEADACHE, INTRACTABLE: ICD-10-CM

## 2022-02-03 DIAGNOSIS — G40.109 EPILEPSY, LOCALIZATION-RELATED: ICD-10-CM

## 2022-02-03 DIAGNOSIS — G43.009 MIGRAINE WITHOUT AURA AND WITHOUT STATUS MIGRAINOSUS, NOT INTRACTABLE: ICD-10-CM

## 2022-02-03 DIAGNOSIS — M35.00 SJOGREN'S SYNDROME, WITH UNSPECIFIED ORGAN INVOLVEMENT: ICD-10-CM

## 2022-02-03 PROCEDURE — 3008F BODY MASS INDEX DOCD: CPT | Mod: CPTII,,, | Performed by: PSYCHIATRY & NEUROLOGY

## 2022-02-03 PROCEDURE — 99999 PR PBB SHADOW E&M-EST. PATIENT-LVL IV: CPT | Mod: PBBFAC,,, | Performed by: PSYCHIATRY & NEUROLOGY

## 2022-02-03 PROCEDURE — 3078F PR MOST RECENT DIASTOLIC BLOOD PRESSURE < 80 MM HG: ICD-10-PCS | Mod: CPTII,,, | Performed by: PSYCHIATRY & NEUROLOGY

## 2022-02-03 PROCEDURE — 1159F PR MEDICATION LIST DOCUMENTED IN MEDICAL RECORD: ICD-10-PCS | Mod: CPTII,,, | Performed by: PSYCHIATRY & NEUROLOGY

## 2022-02-03 PROCEDURE — 99214 OFFICE O/P EST MOD 30 MIN: CPT | Mod: PBBFAC | Performed by: PSYCHIATRY & NEUROLOGY

## 2022-02-03 PROCEDURE — 3074F SYST BP LT 130 MM HG: CPT | Mod: CPTII,,, | Performed by: PSYCHIATRY & NEUROLOGY

## 2022-02-03 PROCEDURE — 99417 PROLNG OP E/M EACH 15 MIN: CPT | Mod: S$PBB,,, | Performed by: PSYCHIATRY & NEUROLOGY

## 2022-02-03 PROCEDURE — 1159F MED LIST DOCD IN RCRD: CPT | Mod: CPTII,,, | Performed by: PSYCHIATRY & NEUROLOGY

## 2022-02-03 PROCEDURE — 99215 PR OFFICE/OUTPT VISIT, EST, LEVL V, 40-54 MIN: ICD-10-PCS | Mod: S$PBB,,, | Performed by: PSYCHIATRY & NEUROLOGY

## 2022-02-03 PROCEDURE — 99417 PR PROLONGED SVC, OUTPT, W/WO DIRECT PT CONTACT,  EA ADDTL 15 MIN: ICD-10-PCS | Mod: S$PBB,,, | Performed by: PSYCHIATRY & NEUROLOGY

## 2022-02-03 PROCEDURE — 3074F PR MOST RECENT SYSTOLIC BLOOD PRESSURE < 130 MM HG: ICD-10-PCS | Mod: CPTII,,, | Performed by: PSYCHIATRY & NEUROLOGY

## 2022-02-03 PROCEDURE — 3078F DIAST BP <80 MM HG: CPT | Mod: CPTII,,, | Performed by: PSYCHIATRY & NEUROLOGY

## 2022-02-03 PROCEDURE — 99215 OFFICE O/P EST HI 40 MIN: CPT | Mod: S$PBB,,, | Performed by: PSYCHIATRY & NEUROLOGY

## 2022-02-03 PROCEDURE — 3008F PR BODY MASS INDEX (BMI) DOCUMENTED: ICD-10-PCS | Mod: CPTII,,, | Performed by: PSYCHIATRY & NEUROLOGY

## 2022-02-03 PROCEDURE — 99999 PR PBB SHADOW E&M-EST. PATIENT-LVL IV: ICD-10-PCS | Mod: PBBFAC,,, | Performed by: PSYCHIATRY & NEUROLOGY

## 2022-02-03 RX ORDER — MIDAZOLAM 5 MG/.1ML
1 SPRAY NASAL ONCE AS NEEDED
Qty: 2 EACH | Refills: 5 | Status: SHIPPED | OUTPATIENT
Start: 2022-02-03 | End: 2022-02-03

## 2022-02-03 RX ORDER — OXCARBAZEPINE 600 MG/1
600 TABLET, FILM COATED ORAL 2 TIMES DAILY
Qty: 120 TABLET | Refills: 5 | Status: SHIPPED | OUTPATIENT
Start: 2022-02-03 | End: 2022-08-09 | Stop reason: SDUPTHER

## 2022-02-03 RX ORDER — DIVALPROEX SODIUM 500 MG/1
500 TABLET, DELAYED RELEASE ORAL EVERY 12 HOURS
Qty: 60 TABLET | Refills: 5 | Status: SHIPPED | OUTPATIENT
Start: 2022-02-03 | End: 2022-08-09 | Stop reason: SDUPTHER

## 2022-02-03 RX ORDER — LACOSAMIDE 150 MG/1
300 TABLET ORAL 2 TIMES DAILY
Qty: 360 TABLET | Refills: 5 | Status: SHIPPED | OUTPATIENT
Start: 2022-02-03 | End: 2022-08-09 | Stop reason: SDUPTHER

## 2022-02-03 NOTE — PROGRESS NOTES
Subjective:       Patient ID: Davis Duenas is a 21 y.o. female.    Chief Complaint: Epilepsy, localization-related           HPI     The patient is new to me and here to establish care.      The patient started having seizure in infancy at 10 days old. 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 It was initially Ithought that hyperisulinism was the cause of the seizures. Underwent pancreatectomy at age of  6 months old at [facility] thinking that would improve her condition but it did not.  The patient was started on AED medications at the age of  18 months after being diagnosed with epilepsy. The patient would exhibit multiple types of seizure like episodes she would stare off into on direction and her  mouth eyes would twitch on one side and she would began to jerk and shake. Seizures start as staring followed by facial twitching and them grand mal seizures. Initially, she was prone to having repetitive and prolonged seizures. At baseline, she would have 3 seizures every 2 months on average.Routine EEGs have been normal except at age 15 when EEG was read abnormal. The patient also has mild intellectual disability but highly functioning. Prior AEDs: PB, DZP,  TPM, LEV, PHT,  LEV and   ZNS. Current AEDs:  mg BID,  mg po BID and VPA  mg BID. Adding VPA helped tremendously. Has not had a seizure since .    Adding VPA also helped eradicate her frequent severe headaches that improved with sleep and OTC NSAIDs.         Review of Systems   Constitutional: Positive for fatigue. Negative for appetite change.   HENT: Negative for hearing loss and tinnitus.    Eyes: Negative for photophobia and visual disturbance.   Respiratory: Negative for apnea and shortness of breath.    Cardiovascular: Negative for chest pain and palpitations.   Gastrointestinal: Negative for nausea and vomiting.   Endocrine: Negative for cold intolerance and heat intolerance.  "  Genitourinary: Negative for difficulty urinating and urgency.   Musculoskeletal: Negative for arthralgias, back pain, gait problem, joint swelling, myalgias, neck pain and neck stiffness.   Skin: Negative for color change and rash.   Allergic/Immunologic: Negative for environmental allergies and immunocompromised state.   Neurological: Positive for dizziness, seizures and headaches. Negative for tremors, syncope, facial asymmetry, speech difficulty, weakness, light-headedness and numbness.   Hematological: Negative for adenopathy. Does not bruise/bleed easily.   Psychiatric/Behavioral: Positive for sleep disturbance. Negative for agitation, behavioral problems, confusion, decreased concentration, dysphoric mood, hallucinations, self-injury and suicidal ideas. The patient is not hyperactive.                  Current Outpatient Medications:     BD ULTRA-FINE BEATRIZ PEN NEEDLE 32 gauge x 5/32" Ndle, 1 each by Misc.(Non-Drug; Combo Route) route 4 (four) times daily., Disp: 200 each, Rfl: 3    blood sugar diagnostic Strp, To use to check blood sugar 4 times daily. Contour next meter, Disp: 200 strip, Rfl: 4    blood-glucose meter,continuous (DEXCOM G6 ) Misc, 1 each by Misc.(Non-Drug; Combo Route) route continuous prn., Disp: 1 each, Rfl: prn    blood-glucose sensor (DEXCOM G6 SENSOR) Elis, 3 each by Misc.(Non-Drug; Combo Route) route continuous prn., Disp: 3 each, Rfl: prn    blood-glucose transmitter (DEXCOM G6 TRANSMITTER) Elis, 1 each by Misc.(Non-Drug; Combo Route) route continuous prn., Disp: 1 each, Rfl: prn    CALCIUM PHOSPHATE ORAL, Take by mouth., Disp: , Rfl:     glucagon, human recombinant, (GLUCAGEN HYPOKIT) 1 mg SolR, Use as directed for severe hypoglycemia, fainting and/or seizures. Inject up to 1mg  into the muscle and call 911., Disp: , Rfl:     HUMALOG KWIKPEN INSULIN 100 unit/mL pen, Inject 2 Units into the skin 3 (three) times daily before meals. Plus correction scale as directed. Max " TDD 50units., Disp: 3 mL, Rfl: 6    KETOSTIX strip, use to check ketones when blood sugar is more than 250 or during time of illness, Disp: , Rfl:     LEVEMIR FLEXTOUCH U-100 INSULN 100 unit/mL (3 mL) InPn pen, Inject 5 Units into the skin once daily., Disp: 1.5 mL, Rfl: 3    levocetirizine (XYZAL) 5 MG tablet, Take 5 mg by mouth., Disp: , Rfl:     levothyroxine (SYNTHROID) 75 MCG tablet, Take 1 tablet (75 mcg total) by mouth before breakfast., Disp: 30 tablet, Rfl: 6    multivitamin (THERAGRAN) per tablet, daily., Disp: , Rfl:     omeprazole (PRILOSEC) 20 MG capsule, Take 20 mg by mouth once daily., Disp: , Rfl:     ondansetron (ZOFRAN-ODT) 8 MG TbDL, DISSOLVE ONE TABLET BY MOUTH EVERY 8 HOURS AS NEEDED FOR NAUSEA AND VOMITING, Disp: , Rfl:     divalproex (DEPAKOTE) 500 MG TbEC, Take 1 tablet (500 mg total) by mouth every 12 (twelve) hours., Disp: 60 tablet, Rfl: 5    lacosamide (VIMPAT) 150 mg Tab tablet, Take 2 tablets (300 mg total) by mouth 2 (two) times daily., Disp: 360 tablet, Rfl: 5    midazolam (NAYZILAM) 5 mg/spray (0.1 mL) Spry, 1 spray by Nasal route once as needed (Clusters or Prolonged Seizure. May repeat in 10 minutes)., Disp: 2 each, Rfl: 5    OXcarbazepine (TRILEPTAL) 600 MG Tab, Take 1 tablet (600 mg total) by mouth 2 (two) times daily., Disp: 120 tablet, Rfl: 5  Past Medical History:   Diagnosis Date    Hyperinsulinism     Seizures      History reviewed. No pertinent surgical history.  Social History     Socioeconomic History    Marital status: Single   Tobacco Use    Smoking status: Never Smoker    Smokeless tobacco: Never Used   Substance and Sexual Activity    Alcohol use: Never    Drug use: Never    Sexual activity: Never     Partners: Male             Past/Current Medical/Surgical History, Past/Current Social History, Past/Current Family History and Past/Current Medications were reviewed in detail.        Objective:           VITAL SIGNS WERE REVIEWED      GENERAL  APPEARANCE:     The patient looks comfortable.    BMI 28.32     No signs of respiratory distress.    Normal breathing pattern.    No dysmorphic features    Normal eye contact.     GENERAL MEDICAL EXAM:    HEENT:  Head is atraumatic normocephalic. Fundoscopic (Ophthalmoscopic) exam showed no disc edema.      Neck and Axillae: No JVD. No visible lesions.    Cardiopulmonary: No cyanosis. No tachypnea. Normal respiratory effort.    Gastrointestinal/Urogenital:  No jaundice. No stomas or lesions. No visible hernias. No catheters.     Skin, Hair and Nails: No pathognonomic skin rash. No neurofibromatosis. No visible lesions.No stigmata of autoimmune disease. No clubbing.    Limbs: No varicose veins. No visible swelling.    Muskoskeletal: No visible deformities.No visible lesions.           Neurologic Exam     Mental Status   Oriented to person, place, and time.   Registration: recalls 3 of 3 objects. Recall at 5 minutes: recalls 3 of 3 objects. Follows 3 step commands.   Attention: normal. Concentration: normal.   Speech: speech is normal   Level of consciousness: alert  Knowledge: good and consistent with education. Able to perform simple calculations.   Able to name object. Able to read. Able to repeat. Able to write. Normal comprehension.     Cranial Nerves   Cranial nerves II through XII intact.     CN II   Visual fields full to confrontation.   Visual acuity: normal  Right visual field deficit: none  Left visual field deficit: none     CN III, IV, VI   Pupils are equal, round, and reactive to light.  Extraocular motions are normal.   Right pupil: Size: 2 mm. Shape: regular. Reactivity: brisk. Consensual response: intact. Accommodation: intact.   Left pupil: Size: 2 mm. Shape: regular. Reactivity: brisk. Consensual response: intact. Accommodation: intact.   CN III: no CN III palsy  CN VI: no CN VI palsy  Nystagmus: none   Diplopia: none  Ophthalmoparesis: none  Upgaze: normal  Downgaze: normal  Conjugate gaze:  present  Vestibulo-ocular reflex: present    CN V   Facial sensation intact.   Right facial sensation deficit: none  Left facial sensation deficit: none    CN VII   Facial expression full, symmetric.   Right facial weakness: none  Left facial weakness: none    CN VIII   CN VIII normal.   Hearing: intact    CN IX, X   CN IX normal.   CN X normal.   Palate: symmetric    CN XI   CN XI normal.   Right sternocleidomastoid strength: normal  Left sternocleidomastoid strength: normal  Right trapezius strength: normal  Left trapezius strength: normal    CN XII   CN XII normal.   Tongue: not atrophic  Fasciculations: absent  Tongue deviation: none    Motor Exam   Muscle bulk: normal  Overall muscle tone: normal  Right arm tone: normal  Left arm tone: normal  Right arm pronator drift: absent  Left arm pronator drift: absent  Right leg tone: normal  Left leg tone: normal    Strength   Strength 5/5 throughout.   Right neck flexion: 5/5  Left neck flexion: 5/5  Right neck extension: 5/5  Left neck extension: 5/5  Right deltoid: 5/5  Left deltoid: 5/5  Right biceps: 5/5  Left biceps: 5/5  Right triceps: 5/5  Left triceps: 5/5  Right wrist flexion: 5/5  Left wrist flexion: 5/5  Right wrist extension: 5/5  Left wrist extension: 5/5  Right interossei: 5/5  Left interossei: 5/5  Right iliopsoas: 5/5  Left iliopsoas: 5/5  Right quadriceps: 5/5  Left quadriceps: 5/5  Right hamstrin/5  Left hamstrin/5  Right glutei: 5/5  Left glutei: 5/5  Right anterior tibial: 5/5  Left anterior tibial: 5/5  Right posterior tibial: 5/5  Left posterior tibial: 5/5  Right peroneal: 5/5  Left peroneal: 5/5  Right gastroc: 5/5  Left gastroc: 5/5    Sensory Exam   Light touch normal.   Right arm light touch: normal  Left arm light touch: normal  Right leg light touch: normal  Left leg light touch: normal  Vibration normal.   Right arm vibration: normal  Left arm vibration: normal  Right leg vibration: normal  Left leg vibration:  normal  Proprioception normal.   Right arm proprioception: normal  Left arm proprioception: normal  Right leg proprioception: normal  Left leg proprioception: normal  Pinprick normal.   Right arm pinprick: normal  Left arm pinprick: normal  Right leg pinprick: normal  Left leg pinprick: normal  Graphesthesia: normal  Stereognosis: normal    Gait, Coordination, and Reflexes     Gait  Gait: normal    Coordination   Romberg: negative  Finger to nose coordination: normal  Heel to shin coordination: normal  Tandem walking coordination: normal    Tremor   Resting tremor: absent  Intention tremor: absent  Action tremor: absent    Reflexes   Right brachioradialis: 2+  Left brachioradialis: 2+  Right biceps: 2+  Left biceps: 2+  Right triceps: 2+  Left triceps: 2+  Right patellar: 2+  Left patellar: 2+  Right achilles: 2+  Left achilles: 2+  Right plantar: normal  Left plantar: normal  Right Pendleton: absent  Left Pendleton: absent  Right ankle clonus: absent  Left ankle clonus: absent  Right pendular knee jerk: absent  Left pendular knee jerk: absent      Lab Results   Component Value Date    WBC 6.64 12/30/2021    HGB 13.8 12/30/2021    HCT 40.7 12/30/2021    MCV 96 12/30/2021     (L) 12/30/2021     Sodium   Date Value Ref Range Status   12/30/2021 136 136 - 145 mmol/L Final     Potassium   Date Value Ref Range Status   12/30/2021 4.0 3.5 - 5.1 mmol/L Final     Chloride   Date Value Ref Range Status   12/30/2021 104 95 - 110 mmol/L Final     CO2   Date Value Ref Range Status   12/30/2021 28 23 - 29 mmol/L Final     Glucose   Date Value Ref Range Status   12/30/2021 482 (HH) 70 - 110 mg/dL Final     Comment:     GLU critical result(s) called and verbal readback obtained from Nai CAMP at 1546 by Ericka Tamayo MLS. by EKS 12/30/2021 15:46       BUN   Date Value Ref Range Status   12/30/2021 11 6 - 20 mg/dL Final     Creatinine   Date Value Ref Range Status   12/30/2021 0.78 0.50 - 1.40 mg/dL Final     Calcium   Date Value  Ref Range Status   12/30/2021 9.3 8.7 - 10.5 mg/dL Final     Total Protein   Date Value Ref Range Status   12/30/2021 7.7 6.0 - 8.4 g/dL Final     Albumin   Date Value Ref Range Status   12/30/2021 3.5 3.5 - 5.2 g/dL Final     Total Bilirubin   Date Value Ref Range Status   12/30/2021 0.4 0.1 - 1.0 mg/dL Final     Comment:     For infants and newborns, interpretation of results should be based  on gestational age, weight and in agreement with clinical  observations.    Premature Infant recommended reference ranges:  Up to 24 hours.............<8.0 mg/dL  Up to 48 hours............<12.0 mg/dL  3-5 days..................<15.0 mg/dL  6-29 days.................<15.0 mg/dL    For patients on Eltrombopag therapy, use of Dimension [place] TBIL is   not   recommended.       Alkaline Phosphatase   Date Value Ref Range Status   12/30/2021 84 55 - 135 U/L Final     AST   Date Value Ref Range Status   12/30/2021 13 10 - 40 U/L Final     ALT   Date Value Ref Range Status   12/30/2021 30 10 - 44 U/L Final     Anion Gap   Date Value Ref Range Status   12/30/2021 4 (L) 8 - 16 mmol/L Final     eGFR if    Date Value Ref Range Status   12/30/2021 >60.0 >60 mL/min/1.73 m^2 Final     eGFR if non    Date Value Ref Range Status   12/30/2021 >60.0 >60 mL/min/1.73 m^2 Final     Comment:     Calculation used to obtain the estimated glomerular filtration  rate (eGFR) is the CKD-EPI equation.        No results found for: [id number]  Lab Results   Component Value Date    TSH 2.815 06/16/2021 06-      EEG read as abnormal.    07-    EEG NL     06-    Brain MRI NL      11-    EEG NL       02- THROUGH 02- (Interpreted 03-)    AEEG 104 Hours PLE (SW at Pz) Diffuse Encephalopathy (Theta-Delta)      AED:  LCM NORMAL LEVEL RANGE: 01-10   DATE LEVEL    11- 15.5                                           AED: OXC NORMAL LEVEL RANGE: 10-35   DATE LEVEL     11-  16                                           AED: VPA  NORMAL LEVEL RANGE:    DATE LEVEL    11-  47.9 L     06-  79.8 NL                                             Reviewed the neuroimaging independently       Assessment:       1. Refractory epilepsy    2. Migraine without aura and without status migrainosus, not intractable    3. Dizziness and giddiness    4. 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 Chronic tension-type headache, intractable    7. Sjogren's syndrome, with unspecified organ involvement    8. Intellectual disability            EPILEPSY CLASSIFICATION     SEMIOLOGY: DIALEPTIC (FOCA-CRISTIAN)-->GTC      EPILEPTOGENIC ZONE (S): UNKNOWN     ETIOLOGY: UNKNOWN      PRIOR AEDS: PB, LEV, DZP, TPM, ZNS, PHT      CURRENT AEDS: LCM, OXC, VPA      LAST SEIZURE DATE:          COMPREHENSIVE LIST OF AEDs:      Acetazolamide (AZM-Diamox)   Benzos: clonazepam (CZP Klonopin), lorazepam (LZP-Ativan), diazepam (DZP-Valium), clorazepate (CLZ- Tranxene)  Brivaracetam (BRV-Briviact)  Cannabidiol (CBD- Epidiolex)  Carbamazepine (CBZ-Tegretol)  Cenobamate (CNB-Xcopri)  Clobazam (CLB-Onfi)  Eslicarbazepine (ESL-Aptiom)  Ethosuximide (ESX-Zarontin)  Felbamate (FBM-Felbatol)  Gabapentin (GBP-Neurontin)  Lacosamide (LCM-Vimpat)   Lamotrigine (LTG-Lamitcal)  Levetiracetam (LEV- Keppra)  Oxcarbazepine (OXC-Trileptal)   Perampanel (PML-Fycompa)  Phenobarbital (PB)  Phenytoin (PHT-Dilantin)  Pregabalin (PGB-Lyrica)  Primidone (PRM)   Retigabine (RTG- Potiga) Discontinued in 2017  Rufinamide (RFN-Benzil)  Stiripentol (STP-Diacomit)  Tiagabine (TGB-Gabitril)  Topiramate (TPM-Topamax)  Valproate (VPA-Depakote)  Vigabatrin (VGB-Sabril)  Zonisamide (ZNS-Zonegran)    Plan:       EPILEPSY, MEDICALLY REFRACTORY, INFANCY ONSET, WITH MILD INTELLECTUAL DISABILITY      Proceed with AEEG and EMU for proper localization.     The patient was encouraged to maintain  full traditional seizure precautions which include but not limited to avoid driving, avoid high altitudes, avoid being close to fire or fire source, avoid being close to a body of water or swimming alone, avoid operating heavy machinery and avoid using sharp objects if possible. The patient was encouraged to shower (without accumulation of water) instead of taking a bath if unsupervised. The patient was made aware that these precautions are especially important during concurrent illness. Adequate sleep and avoidance of alcohol as important measures to assure good seizure control were discussed with the patient. The patient was also advised not to care for children without company. The patient was advised to pad the side rails with pillows and blankets if applicable.I strongly recommended lowering the bed to the floor level to decrease the risk of falls.     Continue AEDs INDEFINITELY, FOR GOOD AND NEVER SKIP A DOSE. The patient verbalized full understanding. Stressed the extreme medical, personal safety, public safety and legal importance of compliance and seizure control. Will give as many refills as possible with or without face-to-face evaluation to make sure the patient and any patient with epilepsy will never run out of medications. Running out of seizure medications should never happen under our care. I explained to the patient that he should not, under any circumstances, adjust or stop taking his seizure medication without discussing with me. The patient verbalized full understanding.       Explained to the patient and family that if 2 AEDs fail to control the seizures the likelihood of AEDs alone to control the seizures is <10% and other other options should be pursued.    Continue VPA  mg BID.    Continue  mg BID.     Continue  mg BID.     Would like to simplify her regimen. Since she is doing well will continue current regimen.     Add NS Midazolam (Nayzilam) PRN for clustered and/or  prolonged seizures.       AVOID any substance that could lower seizure threshold including but not limited to:      ALCOHOL AND WITHDRAWAL      TRAMADOL.     DEMEROL      ALL STIMULANTS-ALL ADHD MEDICATIONS.      CLOZAPINE.      BUPROPION.     CIPROFLOXACIN          COMMON MIGRAINE SYNDROME, EPISODIC, HIGH FREQUENCY          HEADACHE DIARY     DISCUSSED THE THREE-FOLD MANAGEMENT OF MIGRAINE:      LIFESTYLE CHANGES:     Good sleep hygiene  Avoid general triggers like lack of sleep/too much sleep, prolonged sun exposure, excessive screen time and specific triggers based on you own diary   Minimize physical and emotional stress  Smoking avoidance and cessation  Limit caffeine drinks to 1-2 a day   Good hydration   Small frequent meals and avoid skipping meals   Moderate 30-minute-long aerobic exercises 3 times/week. Avoid strenuous exercise       ABORTIVE MEDICATIONS:     Should only be taken 2-3 times/week to avoid rebound and overuse headaches.    OTC NSAIDs work well and the headaches have subsided.       NEXT OPTIONS:    Triptans    Gepants: Nuretc (rimegepant)75 mg >Ubrelvy (ubrogepant) 100 mg    Ditans: Reyvow (lasmiditan) 100 mg (No driving due to sedation)    Fioricet without codeine with Reglan.      Prednisone with Reglan.      LAST RESORT:     DHE NS Trudhesa (Max 2 a week)     C/I: concomitant use of vasoconstrictors like Triptans, strong CY inhibitors such as HAART PIs (eg, ritonavir, nelfinavir, or indinavir) and Macrolides (eg, erythromycin or clarithromycin), CAD, PVD, Stroke/TIA and Uncontrolled HTN.  Serious SEs include Vasospasm and Fibrosis (chronic use).       PREVENTATIVE MEDICATIONS:       Continue VPA  mg BID.     Failed TPM , ZNS, OXC.       NEXT OPTIONS:     Amitriptyline/Elavil.    Propranolol/Inderal.    Lamotrigine/Lamictal     ANTI-CGRP AGENTS:     Qulipta (alogepant) 60 mg QD    Erenumab (Aimovig) 140 mg SQ Pen monthly (Reported cases of Constipation and BP elevation)      Galcanezumab (Emgality) 120 mg SQ Pen monthly after a loading dose of 240 mg     Fremnezumab (Ajovy) (Ligand Blocker): 225 mg SQ monthly or 675 mg every 3 months       LAST RESORT OPTIONS:      Botox 200 units every 3 months     Namenda 10 mg BID     Neuromodulation: Cefaly, Relivion     Migraine surgery.        ALTERNATIVE OPTIONS:     Helpful supplements include Co-Q 10, B2, Mg, Feverfew (Dolovent combination) and butterbur (Petadolex)    Acupuncture, massage therapy and essential oils.                           MEDICAL/SURGICAL COMORBIDITIES     All relevant medical comorbidities noted and managed by primary care physician and medical care team.          MISCELLANEOUS MEDICAL PROBLEMS       HEALTHY LIFESTYLE AND PREVENTATIVE CARE    The patient to adhere to the age-appropriate health maintenance guidelines including screening tests and vaccinations. The patient to adhere to  healthy lifestyle, optimal weight, exercise, healthy diet, good sleep hygiene and avoiding drugs including smoking, alcohol and recreational drugs.        RTC in 6 months           Jorge Luis Piña MD, FAAN    Attending Neurologist/Epileptologist         Diplomate, American Board of Psychiatry and Neurology    Diplomate, American Board of Clinical Neurophysiology     Fellow, American Academy of Neurology           E/M 95

## 2022-02-03 NOTE — PATIENT INSTRUCTIONS
"Patient Education       Seizures   The Basics   Written by the doctors and editors at [facility]   What are seizures? -- Seizures are waves of abnormal electrical activity in the brain. Seizures can make you pass out, or move or behave strangely. Most seizures last only a few seconds or minutes.  Epilepsy is a condition that causes people to have repeated seizures. But not everyone who has had a seizure has epilepsy. Problems such as low blood sugar or infection can also cause seizures. Other problems such as anxiety or fainting spells can cause events that look like seizures.  What are the symptoms of a seizure? -- There are different kinds of seizures. Each causes a different set of symptoms.  People who have "tonic clonic" or "grand mal" seizures often get stiff and then have jerking movements. People who have other types of seizures have less dramatic changes. For instance, some people have shaking movements in just 1 arm or in a part of their face. Other people suddenly stop responding and stare for a few seconds.  Should I see a doctor or nurse if I have a seizure? -- If you have never had a seizure before and you have one, you (or whoever is with you) should call for an ambulance (in the [place] and [place], dial [id number]). Having a seizure can be a sign that something is wrong with your brain.  How are seizures treated? -- The right treatment for seizures depends on what is causing them. If you have seizures because of an infection, you will probably need treatments to get rid of the infection. On the other hand, if you have repeated seizures because of epilepsy, you will probably need anti-seizure medicines, also called "anti-convulsants."  People sometimes need to try different medicines before they find a treatment that works well. Seizures can be hard to control. But if you work with your doctor, chances are good that you will find a treatment that works.  Do anti-seizure medicines cause side effects? -- Yes. " "Anti-seizure medicines can cause side effects. They can make you feel tired or clumsy, or cause other problems. If you are bothered by side effects, tell your doctor about it. They can work with you to find the medicine or dose that causes the fewest problems. Most of the side effects from these medicines are mild. But there are two side effects that are very serious but rare:  · Anti-seizure medicines can cause a rare but serious skin rash. Tell your doctor or nurse right away if you notice a new rash while taking an anti-seizure medicine.  · Anti-seizure medicines can increase the risk of becoming suicidal (wanting to kill yourself). Tell your doctor or nurse right away if you start to feel depressed or have thoughts of harming yourself.  What if anti-seizure medicines do not work for me? -- If you keep having seizures even after trying different medicines, you might have other options. Some people can have surgery to remove the small part of their brain that is causing seizures. Others get a device called a "vagus nerve stimulator" put in their chest to help control seizures.  A special diet, called the "ketogenic diet," might be helpful for some people. This diet involves eating foods that are high in fat but avoiding carbohydrates. If you are interested in trying this kind of diet, your doctor or nurse can talk to you about how to do this safely.   What can I do to keep myself safe? -- Until you have your seizures under control, do not drive. The laws that say when a person with seizures can drive are different depending on where the person lives. Ask your doctor if you can safely drive and about the laws where you live.  Also, if your seizures are not under control, make sure to take other safety steps. For example, do not swim without someone else nearby who could help you if you started having a seizure. And avoid activities that could result in you falling from a height.  How can I reduce my chances of " having more seizures? -- You can:  · Take your medicines exactly as directed - at the right times, and at the right doses.  · Tell your doctor about any side effects you have. That way you can work together to find the best medicine for you.  · Be careful not to let your prescription run out. (Stopping anti-seizure medicine suddenly can put you at risk of seizures.)  · While on anti-seizure medicines, check with your doctor before starting any new medicines. Anti-seizure medicines can interact with prescription and non-prescription medicines, and with herbal drugs. Mixing them can increase side effects or make them not work as well.  · Avoid alcohol. Alcohol can increase the risk of seizures, affect the way seizure medicines work, and increase side effects from anti-seizure medicines.  What should other people do if they see me having a seizure? -- Ask your doctor what your family members, friends, or coworkers should do if you have a seizure. Some people will have seizures from time to time, and they might not need to see a doctor every time. But if you have a seizure that lasts longer than 5 minutes or if you do not wake up after a seizure, someone should call for an ambulance (in the [place] and [place], dial [id number]).  Other people should not try to put anything in your mouth while you are having a seizure. But they should make sure you do not bang against any hard surfaces.  What if I want to get pregnant? -- If you take anti-seizure medicines, speak to your doctor or nurse before you start trying to get pregnant. Some anti-seizure medicines can hurt an unborn baby. You might need to switch medicines before you get pregnant.  All topics are updated as new evidence becomes available and our peer review process is complete.  This topic retrieved from [employer] on: Sep 21, 2021.  Topic 89401 Version 17.0  Release: 29.4.2 - C29.263  © 2021 UpTo[employer], Inc. and/or its affiliates. All rights reserved.  Consumer Information Use  and Disclaimer   This information is not specific medical advice and does not replace information you receive from your health care provider. This is only a brief summary of general information. It does NOT include all information about conditions, illnesses, injuries, tests, procedures, treatments, therapies, discharge instructions or life-style choices that may apply to you. You must talk with your health care provider for complete information about your health and treatment options. This information should not be used to decide whether or not to accept your health care provider's advice, instructions or recommendations. Only your health care provider has the knowledge and training to provide advice that is right for you. The use of this information is governed by the [employer] End User License Agreement, available at https://www.[employer].Really Simple/en/solutions/[employer]le/about/isabell.The use of [employer] content is governed by the [employer] Terms of Use. ©2021 [employer] All rights reserved.  Copyright   © 2021 Up[employer]Date, Inc. and/or its affiliates. All rights reserved.

## 2022-02-20 ENCOUNTER — PATIENT MESSAGE (OUTPATIENT)
Dept: NEUROLOGY | Facility: CLINIC | Age: 22
End: 2022-02-20
Payer: MEDICAID

## 2022-02-21 ENCOUNTER — PATIENT MESSAGE (OUTPATIENT)
Dept: NEUROLOGY | Facility: CLINIC | Age: 22
End: 2022-02-21
Payer: MEDICAID

## 2022-02-22 RX ORDER — PEN NEEDLE, DIABETIC 31 GX5/16"
1 NEEDLE, DISPOSABLE MISCELLANEOUS 4 TIMES DAILY
Qty: 200 EACH | Refills: 3 | Status: SHIPPED | OUTPATIENT
Start: 2022-02-22 | End: 2022-08-10 | Stop reason: SDUPTHER

## 2022-02-23 PROCEDURE — 95726 EEG PHY/QHP>84 HR W/VEEG: CPT | Mod: ,,, | Performed by: PSYCHIATRY & NEUROLOGY

## 2022-02-23 PROCEDURE — 95726 PR EEG, W/VIDEO, CONT RECORD, CMPLT STDY, I&R, >84 HRS: ICD-10-PCS | Mod: ,,, | Performed by: PSYCHIATRY & NEUROLOGY

## 2022-03-07 ENCOUNTER — PATIENT MESSAGE (OUTPATIENT)
Dept: NEUROLOGY | Facility: CLINIC | Age: 22
End: 2022-03-07
Payer: MEDICAID

## 2022-03-07 ENCOUNTER — TELEPHONE (OUTPATIENT)
Dept: NEUROLOGY | Facility: CLINIC | Age: 22
End: 2022-03-07
Payer: MEDICAID

## 2022-03-07 NOTE — TELEPHONE ENCOUNTER
----- Message from Jorge Luis Piña MD sent at 3/7/2022 12:54 PM CST -----    02- THROUGH 02- (Interpreted 03-)    AEEG 104 Hours PLE (parietal lobe epilepsy) ([facility] at [facility]) Diffuse Encephalopathy ([place])

## 2022-03-28 ENCOUNTER — PATIENT MESSAGE (OUTPATIENT)
Dept: NEUROLOGY | Facility: CLINIC | Age: 22
End: 2022-03-28
Payer: MEDICAID

## 2022-05-10 DIAGNOSIS — E03.9 HYPOTHYROIDISM, UNSPECIFIED TYPE: ICD-10-CM

## 2022-05-10 RX ORDER — LEVOTHYROXINE SODIUM 75 UG/1
75 TABLET ORAL
Qty: 30 TABLET | Refills: 1 | Status: SHIPPED | OUTPATIENT
Start: 2022-05-10 | End: 2022-08-04 | Stop reason: SDUPTHER

## 2022-06-14 ENCOUNTER — PATIENT MESSAGE (OUTPATIENT)
Dept: NEUROLOGY | Facility: CLINIC | Age: 22
End: 2022-06-14
Payer: MEDICAID

## 2022-06-14 DIAGNOSIS — G40.109 EPILEPSY, LOCALIZATION-RELATED: Primary | ICD-10-CM

## 2022-06-21 ENCOUNTER — TELEPHONE (OUTPATIENT)
Dept: NEUROLOGY | Facility: CLINIC | Age: 22
End: 2022-06-21
Payer: MEDICAID

## 2022-06-21 NOTE — TELEPHONE ENCOUNTER
----- Message from Genie Villanueva NP sent at 6/21/2022  8:29 AM CDT -----  Regarding: Results  Lab Results:      06-    VPA Level 79.8 NL ( )

## 2022-08-03 ENCOUNTER — OFFICE VISIT (OUTPATIENT)
Dept: NEUROLOGY | Facility: CLINIC | Age: 22
End: 2022-08-03
Payer: MEDICAID

## 2022-08-03 ENCOUNTER — PATIENT MESSAGE (OUTPATIENT)
Dept: ENDOCRINOLOGY | Facility: CLINIC | Age: 22
End: 2022-08-03
Payer: MEDICAID

## 2022-08-03 VITALS
HEART RATE: 69 BPM | SYSTOLIC BLOOD PRESSURE: 109 MMHG | WEIGHT: 153.25 LBS | DIASTOLIC BLOOD PRESSURE: 75 MMHG | HEIGHT: 66 IN | BODY MASS INDEX: 24.63 KG/M2

## 2022-08-03 DIAGNOSIS — G40.109 EPILEPSY, LOCALIZATION-RELATED: Primary | ICD-10-CM

## 2022-08-03 DIAGNOSIS — F79 INTELLECTUAL DISABILITY: ICD-10-CM

## 2022-08-03 DIAGNOSIS — G44.221 CHRONIC TENSION-TYPE HEADACHE, INTRACTABLE: ICD-10-CM

## 2022-08-03 DIAGNOSIS — G40.802 EPILEPSY WITH BOTH GENERALIZED AND FOCAL FEATURES: ICD-10-CM

## 2022-08-03 DIAGNOSIS — E03.9 HYPOTHYROIDISM, UNSPECIFIED TYPE: Primary | ICD-10-CM

## 2022-08-03 DIAGNOSIS — E13.9 DIABETES MELLITUS SECONDARY TO PANCREATECTOMY: ICD-10-CM

## 2022-08-03 DIAGNOSIS — M35.00 SJOGREN'S SYNDROME, WITH UNSPECIFIED ORGAN INVOLVEMENT: ICD-10-CM

## 2022-08-03 DIAGNOSIS — G40.919 REFRACTORY EPILEPSY: ICD-10-CM

## 2022-08-03 DIAGNOSIS — Z90.410 DIABETES MELLITUS SECONDARY TO PANCREATECTOMY: ICD-10-CM

## 2022-08-03 DIAGNOSIS — E89.1 DIABETES MELLITUS SECONDARY TO PANCREATECTOMY: ICD-10-CM

## 2022-08-03 DIAGNOSIS — G43.009 MIGRAINE WITHOUT AURA AND WITHOUT STATUS MIGRAINOSUS, NOT INTRACTABLE: ICD-10-CM

## 2022-08-03 DIAGNOSIS — R42 DIZZINESS AND GIDDINESS: ICD-10-CM

## 2022-08-03 PROCEDURE — 99215 OFFICE O/P EST HI 40 MIN: CPT | Mod: S$PBB,,, | Performed by: NURSE PRACTITIONER

## 2022-08-03 PROCEDURE — 3008F BODY MASS INDEX DOCD: CPT | Mod: CPTII,,, | Performed by: NURSE PRACTITIONER

## 2022-08-03 PROCEDURE — 3008F PR BODY MASS INDEX (BMI) DOCUMENTED: ICD-10-PCS | Mod: CPTII,,, | Performed by: NURSE PRACTITIONER

## 2022-08-03 PROCEDURE — 1159F MED LIST DOCD IN RCRD: CPT | Mod: CPTII,,, | Performed by: NURSE PRACTITIONER

## 2022-08-03 PROCEDURE — 3078F PR MOST RECENT DIASTOLIC BLOOD PRESSURE < 80 MM HG: ICD-10-PCS | Mod: CPTII,,, | Performed by: NURSE PRACTITIONER

## 2022-08-03 PROCEDURE — 99215 PR OFFICE/OUTPT VISIT, EST, LEVL V, 40-54 MIN: ICD-10-PCS | Mod: S$PBB,,, | Performed by: NURSE PRACTITIONER

## 2022-08-03 PROCEDURE — 1160F PR REVIEW ALL MEDS BY PRESCRIBER/CLIN PHARMACIST DOCUMENTED: ICD-10-PCS | Mod: CPTII,,, | Performed by: NURSE PRACTITIONER

## 2022-08-03 PROCEDURE — 1160F RVW MEDS BY RX/DR IN RCRD: CPT | Mod: CPTII,,, | Performed by: NURSE PRACTITIONER

## 2022-08-03 PROCEDURE — 3074F PR MOST RECENT SYSTOLIC BLOOD PRESSURE < 130 MM HG: ICD-10-PCS | Mod: CPTII,,, | Performed by: NURSE PRACTITIONER

## 2022-08-03 PROCEDURE — 1159F PR MEDICATION LIST DOCUMENTED IN MEDICAL RECORD: ICD-10-PCS | Mod: CPTII,,, | Performed by: NURSE PRACTITIONER

## 2022-08-03 PROCEDURE — 99214 OFFICE O/P EST MOD 30 MIN: CPT | Mod: PBBFAC | Performed by: NURSE PRACTITIONER

## 2022-08-03 PROCEDURE — 3078F DIAST BP <80 MM HG: CPT | Mod: CPTII,,, | Performed by: NURSE PRACTITIONER

## 2022-08-03 PROCEDURE — 3074F SYST BP LT 130 MM HG: CPT | Mod: CPTII,,, | Performed by: NURSE PRACTITIONER

## 2022-08-03 PROCEDURE — 99999 PR PBB SHADOW E&M-EST. PATIENT-LVL IV: CPT | Mod: PBBFAC,,, | Performed by: NURSE PRACTITIONER

## 2022-08-03 PROCEDURE — 99999 PR PBB SHADOW E&M-EST. PATIENT-LVL IV: ICD-10-PCS | Mod: PBBFAC,,, | Performed by: NURSE PRACTITIONER

## 2022-08-03 RX ORDER — CALC/MAG/B COMPLEX/D3/HERB 61
15 TABLET ORAL DAILY
COMMUNITY
Start: 2022-07-13

## 2022-08-03 NOTE — PROGRESS NOTES
Subjective:       Patient ID: Davis Duenas is a 21 y.o. female.    Chief Complaint: Epilepsy, localization-related           HPI     The patient is new to me and here to establish care.      The patient started having seizure in infancy at 10 days old. 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 It was initially Ithought that hyperisulinism was the cause of the seizures. Underwent pancreatectomy at age of  6 months old at [facility] thinking that would improve her condition but it did not.  The patient was started on AED medications at the age of  18 months after being diagnosed with epilepsy. The patient would exhibit multiple types of seizure like episodes she would stare off into on direction and her  mouth eyes would twitch on one side and she would began to jerk and shake. Seizures start as staring followed by facial twitching and them grand mal seizures. Initially, she was prone to having repetitive and prolonged seizures. At baseline, she would have 3 seizures every 2 months on average.Routine EEGs have been normal except at age 15 when EEG was read abnormal. The patient also has mild intellectual disability but highly functioning. Prior AEDs: PB, DZP,  TPM, LEV, PHT,  LEV and   ZNS. Current AEDs:  mg BID,  mg po BID and VPA  mg BID. Adding VPA helped tremendously. Has not had a seizure since .    Adding VPA also helped eradicate her frequent severe headaches that improved with sleep and OTC NSAIDs.         2022 Patient present for Epilepsy management follow up accompanied by mother. Patient doing well. Patient last seizure occcured on 2021. Two months seizure free.  Patient reports she recall having a headache prior to event, she was putting Ice cream in the sink is the last thing she recall prior to falling to floor, grandmother witnessed her shaking and jerking, state it was very brief it stopped before she could  "give rescue medication. Grandmother estimated it lasted 3 to 5 min. No tongue biting  no urinary incontinence. Mother states prior to June event she had an event on 02-19/ 02-20, 2022. 06- LCM level 12.7^ and OXC 15 NL.     02- THROUGH 02- (Interpreted 03-) AEEG 104 Hours PLE (SW at Pz) Diffuse Encephalopathy (Theta-Delta)          Review of Systems   Constitutional: Positive for fatigue. Negative for appetite change.   HENT: Negative for hearing loss and tinnitus.    Eyes: Negative for photophobia and visual disturbance.   Respiratory: Negative for apnea and shortness of breath.    Cardiovascular: Negative for chest pain and palpitations.   Gastrointestinal: Negative for nausea and vomiting.   Endocrine: Negative for cold intolerance and heat intolerance.   Genitourinary: Negative for difficulty urinating and urgency.   Musculoskeletal: Negative for arthralgias, back pain, gait problem, joint swelling, myalgias, neck pain and neck stiffness.   Skin: Negative for color change and rash.   Allergic/Immunologic: Negative for environmental allergies and immunocompromised state.   Neurological: Positive for dizziness, seizures and headaches. Negative for tremors, syncope, facial asymmetry, speech difficulty, weakness, light-headedness and numbness.   Hematological: Negative for adenopathy. Does not bruise/bleed easily.   Psychiatric/Behavioral: Positive for sleep disturbance. Negative for agitation, behavioral problems, confusion, decreased concentration, dysphoric mood, hallucinations, self-injury and suicidal ideas. The patient is not hyperactive.                  Current Outpatient Medications:     BD ULTRA-FINE BEATRIZ PEN NEEDLE 32 gauge x 5/32" Ndle, 1 each by Misc.(Non-Drug; Combo Route) route 4 (four) times daily., Disp: 200 each, Rfl: 3    blood sugar diagnostic Strp, To use to check blood sugar 4 times daily. Contour next meter, Disp: 200 strip, Rfl: 4    CALCIUM PHOSPHATE ORAL, Take " by mouth., Disp: , Rfl:     divalproex (DEPAKOTE) 500 MG TbEC, Take 1 tablet (500 mg total) by mouth every 12 (twelve) hours., Disp: 60 tablet, Rfl: 5    glucagon, human recombinant, (GLUCAGEN HYPOKIT) 1 mg SolR, Use as directed for severe hypoglycemia, fainting and/or seizures. Inject up to 1mg  into the muscle and call 911., Disp: , Rfl:     HUMALOG KWIKPEN INSULIN 100 unit/mL pen, Inject 2 Units into the skin 3 (three) times daily before meals. Plus correction scale as directed. Max TDD 50units., Disp: 3 mL, Rfl: 6    KETOSTIX strip, use to check ketones when blood sugar is more than 250 or during time of illness, Disp: , Rfl:     lacosamide (VIMPAT) 150 mg Tab tablet, Take 2 tablets (300 mg total) by mouth 2 (two) times daily., Disp: 360 tablet, Rfl: 5    lansoprazole (PREVACID) 15 MG capsule, Take 15 mg by mouth once daily., Disp: , Rfl:     levocetirizine (XYZAL) 5 MG tablet, Take 5 mg by mouth., Disp: , Rfl:     levothyroxine (SYNTHROID) 75 MCG tablet, Take 1 tablet (75 mcg total) by mouth before breakfast., Disp: 30 tablet, Rfl: 1    multivitamin (THERAGRAN) per tablet, daily., Disp: , Rfl:     omeprazole (PRILOSEC) 20 MG capsule, Take 20 mg by mouth once daily., Disp: , Rfl:     ondansetron (ZOFRAN-ODT) 8 MG TbDL, DISSOLVE ONE TABLET BY MOUTH EVERY 8 HOURS AS NEEDED FOR NAUSEA AND VOMITING, Disp: , Rfl:     OXcarbazepine (TRILEPTAL) 600 MG Tab, Take 1 tablet (600 mg total) by mouth 2 (two) times daily., Disp: 120 tablet, Rfl: 5    baclofen (LIORESAL) 10 MG tablet, Take 1 tablet (10 mg total) by mouth 2 (two) times daily as needed. (Patient not taking: Reported on 8/3/2022), Disp: 60 tablet, Rfl: 3    blood-glucose meter,continuous (DEXCOM G6 ) Misc, 1 each by Misc.(Non-Drug; Combo Route) route continuous prn., Disp: 1 each, Rfl: prn    blood-glucose sensor (DEXCOM G6 SENSOR) Elis, 3 each by Misc.(Non-Drug; Combo Route) route continuous prn., Disp: 3 each, Rfl: prn    blood-glucose  transmitter (DEXCOM G6 TRANSMITTER) Elis, 1 each by Misc.(Non-Drug; Combo Route) route continuous prn., Disp: 1 each, Rfl: prn    LEVEMIR FLEXTOUCH U-100 INSULN 100 unit/mL (3 mL) InPn pen, Inject 5 Units into the skin once daily., Disp: 1.5 mL, Rfl: 3  Past Medical History:   Diagnosis Date    Hyperinsulinism     Seizures      History reviewed. No pertinent surgical history.  Social History     Socioeconomic History    Marital status: Single   Tobacco Use    Smoking status: Never Smoker    Smokeless tobacco: Never Used   Substance and Sexual Activity    Alcohol use: Never    Drug use: Never    Sexual activity: Never     Partners: Male             Past/Current Medical/Surgical History, Past/Current Social History, Past/Current Family History and Past/Current Medications were reviewed in detail.        Objective:           VITAL SIGNS WERE REVIEWED      GENERAL APPEARANCE:     The patient looks comfortable.    BMI 24.73 KG    No signs of respiratory distress.    Normal breathing pattern.    No dysmorphic features    Normal eye contact.     GENERAL MEDICAL EXAM:    HEENT:  Head is atraumatic normocephalic. Fundoscopic (Ophthalmoscopic) exam showed no disc edema.      Neck and Axillae: No JVD. No visible lesions.    Cardiopulmonary: No cyanosis. No tachypnea. Normal respiratory effort.    Gastrointestinal/Urogenital:  No jaundice. No stomas or lesions. No visible hernias. No catheters.     Skin, Hair and Nails: No pathognonomic skin rash. No neurofibromatosis. No visible lesions.No stigmata of autoimmune disease. No clubbing.    Limbs: No varicose veins. No visible swelling.    Muskoskeletal: No visible deformities.No visible lesions.           Neurologic Exam     Mental Status   Oriented to person, place, and time.   Registration: recalls 3 of 3 objects. Recall at 5 minutes: recalls 3 of 3 objects. Follows 3 step commands.   Attention: normal. Concentration: normal.   Speech: speech is normal   Level of  consciousness: alert  Knowledge: good and consistent with education. Able to perform simple calculations.   Able to name object. Able to read. Able to repeat. Able to write. Normal comprehension.     Cranial Nerves   Cranial nerves II through XII intact.     CN II   Visual fields full to confrontation.   Visual acuity: normal  Right visual field deficit: none  Left visual field deficit: none     CN III, IV, VI   Pupils are equal, round, and reactive to light.  Extraocular motions are normal.   Right pupil: Size: 2 mm. Shape: regular. Reactivity: brisk. Consensual response: intact. Accommodation: intact.   Left pupil: Size: 2 mm. Shape: regular. Reactivity: brisk. Consensual response: intact. Accommodation: intact.   CN III: no CN III palsy  CN VI: no CN VI palsy  Nystagmus: none   Diplopia: none  Ophthalmoparesis: none  Upgaze: normal  Downgaze: normal  Conjugate gaze: present  Vestibulo-ocular reflex: present    CN V   Facial sensation intact.   Right facial sensation deficit: none  Left facial sensation deficit: none    CN VII   Facial expression full, symmetric.   Right facial weakness: none  Left facial weakness: none    CN VIII   CN VIII normal.   Hearing: intact    CN IX, X   CN IX normal.   CN X normal.   Palate: symmetric    CN XI   CN XI normal.   Right sternocleidomastoid strength: normal  Left sternocleidomastoid strength: normal  Right trapezius strength: normal  Left trapezius strength: normal    CN XII   CN XII normal.   Tongue: not atrophic  Fasciculations: absent  Tongue deviation: none    Motor Exam   Muscle bulk: normal  Overall muscle tone: normal  Right arm tone: normal  Left arm tone: normal  Right arm pronator drift: absent  Left arm pronator drift: absent  Right leg tone: normal  Left leg tone: normal    Strength   Strength 5/5 throughout.   Right neck flexion: 5/5  Left neck flexion: 5/5  Right neck extension: 5/5  Left neck extension: 5/5  Right deltoid: 5/5  Left deltoid: 5/5  Right biceps:  5/5  Left biceps: 5/5  Right triceps: 5/5  Left triceps: 5/5  Right wrist flexion: 5/5  Left wrist flexion: 5/5  Right wrist extension: 5/5  Left wrist extension: 5/5  Right interossei: 5/5  Left interossei: 5/5  Right iliopsoas: 5/5  Left iliopsoas: 5/5  Right quadriceps: 5/5  Left quadriceps: 5/5  Right hamstrin/5  Left hamstrin/5  Right glutei: 5/5  Left glutei: 5/5  Right anterior tibial: 5/5  Left anterior tibial: 5/5  Right posterior tibial: 5/5  Left posterior tibial: 5/5  Right peroneal: 55  Left peroneal: 55  Right gastroc: 5/5  Left gastroc: 5    Sensory Exam   Light touch normal.   Right arm light touch: normal  Left arm light touch: normal  Right leg light touch: normal  Left leg light touch: normal  Vibration normal.   Right arm vibration: normal  Left arm vibration: normal  Right leg vibration: normal  Left leg vibration: normal  Proprioception normal.   Right arm proprioception: normal  Left arm proprioception: normal  Right leg proprioception: normal  Left leg proprioception: normal  Pinprick normal.   Right arm pinprick: normal  Left arm pinprick: normal  Right leg pinprick: normal  Left leg pinprick: normal  Graphesthesia: normal  Stereognosis: normal    Gait, Coordination, and Reflexes     Gait  Gait: normal    Coordination   Romberg: negative  Finger to nose coordination: normal  Heel to shin coordination: normal  Tandem walking coordination: normal    Tremor   Resting tremor: absent  Intention tremor: absent  Action tremor: absent    Reflexes   Right brachioradialis: 2+  Left brachioradialis: 2+  Right biceps: 2+  Left biceps: 2+  Right triceps: 2+  Left triceps: 2+  Right patellar: 2+  Left patellar: 2+  Right achilles: 2+  Left achilles: 2+  Right plantar: normal  Left plantar: normal  Right Pendleton: absent  Left Pendleton: absent  Right ankle clonus: absent  Left ankle clonus: absent  Right pendular knee jerk: absent  Left pendular knee jerk: absent      Lab Results   Component  Value Date    WBC 6.64 12/30/2021    HGB 13.8 12/30/2021    HCT 40.7 12/30/2021    MCV 96 12/30/2021     (L) 12/30/2021     Sodium   Date Value Ref Range Status   12/30/2021 136 136 - 145 mmol/L Final     Potassium   Date Value Ref Range Status   12/30/2021 4.0 3.5 - 5.1 mmol/L Final     Chloride   Date Value Ref Range Status   12/30/2021 104 95 - 110 mmol/L Final     CO2   Date Value Ref Range Status   12/30/2021 28 23 - 29 mmol/L Final     Glucose   Date Value Ref Range Status   12/30/2021 482 (HH) 70 - 110 mg/dL Final     Comment:     GLU critical result(s) called and verbal readback obtained from Nai CAMP at 1546 by Ericka Tamayo MLS. by EKS 12/30/2021 15:46       BUN   Date Value Ref Range Status   12/30/2021 11 6 - 20 mg/dL Final     Creatinine   Date Value Ref Range Status   12/30/2021 0.78 0.50 - 1.40 mg/dL Final     Calcium   Date Value Ref Range Status   12/30/2021 9.3 8.7 - 10.5 mg/dL Final     Total Protein   Date Value Ref Range Status   12/30/2021 7.7 6.0 - 8.4 g/dL Final     Albumin   Date Value Ref Range Status   12/30/2021 3.5 3.5 - 5.2 g/dL Final     Total Bilirubin   Date Value Ref Range Status   12/30/2021 0.4 0.1 - 1.0 mg/dL Final     Comment:     For infants and newborns, interpretation of results should be based  on gestational age, weight and in agreement with clinical  observations.    Premature Infant recommended reference ranges:  Up to 24 hours.............<8.0 mg/dL  Up to 48 hours............<12.0 mg/dL  3-5 days..................<15.0 mg/dL  6-29 days.................<15.0 mg/dL    For patients on Eltrombopag therapy, use of Dimension [place] TBIL is   not   recommended.       Alkaline Phosphatase   Date Value Ref Range Status   12/30/2021 84 55 - 135 U/L Final     AST   Date Value Ref Range Status   12/30/2021 13 10 - 40 U/L Final     ALT   Date Value Ref Range Status   12/30/2021 30 10 - 44 U/L Final     Anion Gap   Date Value Ref Range Status   12/30/2021 4 (L) 8 - 16 mmol/L  Final     eGFR if    Date Value Ref Range Status   12/30/2021 >60.0 >60 mL/min/1.73 m^2 Final     eGFR if non    Date Value Ref Range Status   12/30/2021 >60.0 >60 mL/min/1.73 m^2 Final     Comment:     Calculation used to obtain the estimated glomerular filtration  rate (eGFR) is the CKD-EPI equation.        No results found for: [id number]  Lab Results   Component Value Date    TSH 2.815 06/16/2021 06-      EEG read as abnormal.    07-    EEG NL     06-    Brain MRI NL      11-    EEG NL       02- THROUGH 02- (Interpreted 03-)    AEEG 104 Hours PLE (SW at Pz) Diffuse Encephalopathy (Theta-Delta)      AED:  LCM NORMAL LEVEL RANGE: 01-10   DATE LEVEL    11- 15.5     06-  12.7                                    AED: OXC NORMAL LEVEL RANGE: 10-35   DATE LEVEL    11-  16     06-  15                                    AED: VPA  NORMAL LEVEL RANGE:    DATE LEVEL    11-  47.9 L     06-  79.8 NL                                             Reviewed the neuroimaging independently       Assessment:       1. Epilepsy, localization-related    2. Dizziness and giddiness    3. 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 Chronic tension-type headache, intractable    5. Refractory epilepsy    6. Migraine without aura and without status migrainosus, not intractable    7. Sjogren's syndrome, with unspecified organ involvement    8. Intellectual disability    9. Epilepsy with both generalized and focal features            EPILEPSY CLASSIFICATION     SEMIOLOGY: DIALEPTIC (FOCA-CRISTIAN)-->GTC      EPILEPTOGENIC ZONE (S): UNKNOWN     ETIOLOGY: UNKNOWN      PRIOR AEDS: PB, LEV, DZP, TPM, ZNS, PHT      CURRENT AEDS: LCM, OXC, VPA      LAST SEIZURE DATE: 06-        COMPREHENSIVE LIST OF AEDs:      Acetazolamide (AZM-Diamox)   Benzos: clonazepam (CZP Klonopin), lorazepam (LZP-Ativan), diazepam  (DZP-Valium), clorazepate (CLZ- Tranxene)  Brivaracetam (BRV-Briviact)  Cannabidiol (CBD- Epidiolex)  Carbamazepine (CBZ-Tegretol)  Cenobamate (CNB-Xcopri)  Clobazam (CLB-Onfi)  Eslicarbazepine (ESL-Aptiom)  Ethosuximide (ESX-Zarontin)  Felbamate (FBM-Felbatol)  Gabapentin (GBP-Neurontin)  Lacosamide (LCM-Vimpat)   Lamotrigine (LTG-Lamitcal)  Levetiracetam (LEV- Keppra)  Oxcarbazepine (OXC-Trileptal)   Perampanel (PML-Fycompa)  Phenobarbital (PB)  Phenytoin (PHT-Dilantin)  Pregabalin (PGB-Lyrica)  Primidone (PRM)   Retigabine (RTG- Potiga) Discontinued in 2017  Rufinamide (RFN-Benzil)  Stiripentol (STP-Diacomit)  Tiagabine (TGB-Gabitril)  Topiramate (TPM-Topamax)  Valproate (VPA-Depakote)  Vigabatrin (VGB-Sabril)  Zonisamide (ZNS-Zonegran)    Plan:       EPILEPSY, MEDICALLY REFRACTORY, INFANCY ONSET, WITH MILD INTELLECTUAL DISABILITY      Last seizure June 8, 2022 If seizures reoccur will proceed with EMU referral for proper localization and management     The patient was encouraged to maintain full traditional seizure precautions which include but not limited to avoid driving, avoid high altitudes, avoid being close to fire or fire source, avoid being close to a body of water or swimming alone, avoid operating heavy machinery and avoid using sharp objects if possible. The patient was encouraged to shower (without accumulation of water) instead of taking a bath if unsupervised. The patient was made aware that these precautions are especially important during concurrent illness. Adequate sleep and avoidance of alcohol as important measures to assure good seizure control were discussed with the patient. The patient was also advised not to care for children without company. The patient was advised to pad the side rails with pillows and blankets if applicable.I strongly recommended lowering the bed to the floor level to decrease the risk of falls.     Continue AEDs INDEFINITELY, FOR GOOD AND NEVER SKIP A DOSE. The  patient verbalized full understanding. Stressed the extreme medical, personal safety, public safety and legal importance of compliance and seizure control. Will give as many refills as possible with or without face-to-face evaluation to make sure the patient and any patient with epilepsy will never run out of medications. Running out of seizure medications should never happen under our care. I explained to the patient that he should not, under any circumstances, adjust or stop taking his seizure medication without discussing with me. The patient verbalized full understanding.       Explained to the patient and family that if 2 AEDs fail to control the seizures the likelihood of AEDs alone to control the seizures is <10% and other other options should be pursued.    Continue VPA  mg BID. Check level 6 months     Continue  mg BID. Check level 6 months     Continue  mg BID. Check level 6 months     Would like to simplify her regimen. Since she is doing well will continue current regimen.     Add NS Midazolam (Nayzilam) PRN for clustered and/or prolonged seizures.       AVOID any substance that could lower seizure threshold including but not limited to:      ALCOHOL AND WITHDRAWAL      TRAMADOL.     DEMEROL      ALL STIMULANTS-ALL ADHD MEDICATIONS.      CLOZAPINE.      BUPROPION.     CIPROFLOXACIN          COMMON MIGRAINE SYNDROME, EPISODIC, HIGH FREQUENCY          HEADACHE DIARY     DISCUSSED THE THREE-FOLD MANAGEMENT OF MIGRAINE:      LIFESTYLE CHANGES:     Good sleep hygiene  Avoid general triggers like lack of sleep/too much sleep, prolonged sun exposure, excessive screen time and specific triggers based on you own diary   Minimize physical and emotional stress  Smoking avoidance and cessation  Limit caffeine drinks to 1-2 a day   Good hydration   Small frequent meals and avoid skipping meals   Moderate 30-minute-long aerobic exercises 3 times/week. Avoid strenuous exercise       ABORTIVE  MEDICATIONS:     Should only be taken 2-3 times/week to avoid rebound and overuse headaches.    OTC NSAIDs work well and the headaches have subsided.       NEXT OPTIONS:    Triptans    Gepants: Nuretc (rimegepant)75 mg >Ubrelvy (ubrogepant) 100 mg    Ditans: Reyvow (lasmiditan) 100 mg (No driving due to sedation)    Fioricet without codeine with Reglan.      Prednisone with Reglan.      LAST RESORT:     DHE NS Trudhesa (Max 2 a week)     C/I: concomitant use of vasoconstrictors like Triptans, strong CY inhibitors such as HAART PIs (eg, ritonavir, nelfinavir, or indinavir) and Macrolides (eg, erythromycin or clarithromycin), CAD, PVD, Stroke/TIA and Uncontrolled HTN.  Serious SEs include Vasospasm and Fibrosis (chronic use).       PREVENTATIVE MEDICATIONS:       Continue VPA  mg BID.     Failed TPM , ZNS, OXC.       NEXT OPTIONS:     Amitriptyline/Elavil.    Propranolol/Inderal.    Lamotrigine/Lamictal     ANTI-CGRP AGENTS:     Qulipta (alogepant) 60 mg QD    Erenumab (Aimovig) 140 mg SQ Pen monthly (Reported cases of Constipation and BP elevation)     Galcanezumab (Emgality) 120 mg SQ Pen monthly after a loading dose of 240 mg     Fremnezumab (Ajovy) (Ligand Blocker): 225 mg SQ monthly or 675 mg every 3 months       LAST RESORT OPTIONS:      Botox 200 units every 3 months     Namenda 10 mg BID     Neuromodulation: Cefaly, Relivion     Migraine surgery.        ALTERNATIVE OPTIONS:     Helpful supplements include Co-Q 10, B2, Mg, Feverfew (Dolovent combination) and butterbur (Petadolex)    Acupuncture, massage therapy and essential oils.               MEDICAL/SURGICAL COMORBIDITIES     All relevant medical comorbidities noted and managed by primary care physician and medical care team.          MISCELLANEOUS MEDICAL PROBLEMS       HEALTHY LIFESTYLE AND PREVENTATIVE CARE    The patient to adhere to the age-appropriate health maintenance guidelines including screening tests and vaccinations. The patient to  adhere to  healthy lifestyle, optimal weight, exercise, healthy diet, good sleep hygiene and avoiding drugs including smoking, alcohol and recreational drugs.        RTC in 6 months        Tata Villanueva, MSN NP      Collaborating Provider: Jorge Luis Piña MD, FAAN Neurologist/Epileptologist    E/M 40 Min

## 2022-08-04 ENCOUNTER — PATIENT MESSAGE (OUTPATIENT)
Dept: ENDOCRINOLOGY | Facility: CLINIC | Age: 22
End: 2022-08-04
Payer: MEDICAID

## 2022-08-04 DIAGNOSIS — E03.9 HYPOTHYROIDISM, UNSPECIFIED TYPE: ICD-10-CM

## 2022-08-04 RX ORDER — LEVOTHYROXINE SODIUM 75 UG/1
75 TABLET ORAL
Qty: 90 TABLET | Refills: 0 | Status: SHIPPED | OUTPATIENT
Start: 2022-08-04 | End: 2022-12-15 | Stop reason: SDUPTHER

## 2022-08-05 ENCOUNTER — PATIENT MESSAGE (OUTPATIENT)
Dept: NEUROLOGY | Facility: CLINIC | Age: 22
End: 2022-08-05
Payer: MEDICAID

## 2022-08-08 ENCOUNTER — PATIENT MESSAGE (OUTPATIENT)
Dept: ENDOCRINOLOGY | Facility: CLINIC | Age: 22
End: 2022-08-08
Payer: MEDICAID

## 2022-08-08 DIAGNOSIS — E13.9 DIABETES MELLITUS SECONDARY TO PANCREATECTOMY: ICD-10-CM

## 2022-08-08 DIAGNOSIS — E89.1 DIABETES MELLITUS SECONDARY TO PANCREATECTOMY: ICD-10-CM

## 2022-08-08 DIAGNOSIS — E03.9 HYPOTHYROIDISM, UNSPECIFIED TYPE: Primary | ICD-10-CM

## 2022-08-08 DIAGNOSIS — Z90.410 DIABETES MELLITUS SECONDARY TO PANCREATECTOMY: ICD-10-CM

## 2022-08-08 NOTE — PROGRESS NOTES
Subjective:      Patient ID: Davis Duenas is a 21 y.o. female.    Chief Complaint:  No chief complaint on file.    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  Previously seen by me on 6/2021.  This is a MyChart video visit.    The patient location is: [place]  The chief complaint leading to consultation is: DM  Visit type: Virtual visit with synchronous audio and video  Total time spent with patient: see below  Each patient to whom he or she provides medical services by telemedicine is:  (1) informed of the relationship between the physician and patient and the respective role of any other health care provider with respect to management of the patient; and (2) notified that he or she may decline to receive medical services by telemedicine and may withdraw from such care at any time.      Followed with Endocrinology in [place]. LOV 1/31/2020  Davis is a 19 y.o. female with CHI, S/P 98% panc as an infant in [facility] at the age of 6 months. She had hypoglycemia postop and so she was re-tried on Diazoxide which appear to work better at that time.    Over the course of the years she has done extremely well with almost no hypoglycemia while on Diazoxide and every time we tried to wean her off the Diazoxide she gets a recurrence of her hypoglycemia however earlier this year we are finally able to get her off Diazoxide. ( February of 2019). After 3 months her hemoglobin A1c 6.1%. Today she comes to clinic because a recent hemoglobin A1c was performed by her family doctor that was 7% and a random blood sugar was performed that was 191 mg/dL. Glucometer testing at home reveals fasting blood sugars ranging from 187-220 by glucometer. She does not have symptoms of polyuria or polydipsia or weight loss.    Thus based on this information she has random blood sugars greater than 200 with a hemoglobin A1c of 7% in the absence of symptoms indicates that she has  diabetes that is likely status post 98% pancreatectomy as 95 % of patients who have a 98% pancreatectomy will developed diabetes by age 50    Mom is present for visit.    Denies recent illness/ injury/ steroids.    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     Outside labs scanned in media tab  C peptide pending    Diagnosed: 2020  Known complications:  DKA -  RN -  PN -  Nephropathy -  CAD -    Current regimen: patients mother has been increasing her Levemir dosage over the last 6 months  Levemir 20 units in AM and 15 units in PM (increase last week)   Humalog 4units plus correction scale before meals - did not start this until 2022 - was noncompliant prior to this   Add correction scale if needed.  Blood sugar 180 to 230 add 1 units  Blood sugar 231 to 280 add 2 units  Blood sugar 281 to 330 add 3 units  Blood sugar 331 to 380 add 4 units  Blood sugar greater than 380 add 5 units      Reports compliance with basal insulin.  Noncompliant with prandial insulin.   Rotating injection sites.     Other medications tried:  None.     CGM 2020    Glucose Monitor:   4 times a day testing  Log reviewed: oral recall  Global hyperglycemia     Patient has diabetes mellitus and manages diabetes with an intensive insulin regimen. The patient requires a therapeutic CGM and is willing to use therapeutic CGM for the necesary frequent adjustments of insulin therapy. Patient has been using SMBG for frequent glucose monitoring (4+ times daily). I have completed an in-person visit during the previous 6 months and will continue to have in-person visits every 6 months to assess adherence to their CGM regimen and diabetes treatment  plan.    Hypoglycemia: Denies any recent   Hypoglycemia unawareness.  Knows how to correct with 15 grams of carbs- juice, coke, or a peppermint.     Diet/Exercise:  Eats 2 meals a day.   Snacks : cheese crackers   Drinks : diet coke and water with sugar free crystal lite   Exercise: None.   Current weight: 135-140 lb     Education - last visit: 9/2020  Eye Exam: > 1 year ago  Podiatry: None    Diabetes Management Status    Hemoglobin A1C   Date Value Ref Range Status   06/16/2021 11.2 (H) 4.0 - 5.6 % Final     Comment:     ADA Screening Guidelines:  5.7-6.4%  Consistent with prediabetes  >or=6.5%  Consistent with diabetes    High levels of fetal hemoglobin interfere with the HbA1C  assay. Heterozygous hemoglobin variants (HbS, HgC, etc)do  not significantly interfere with this assay.   However, presence of multiple variants may affect accuracy.     07/07/2020 7.8 (H) 4.0 - 5.6 % Final     Comment:     ADA Screening Guidelines:  5.7-6.4%  Consistent with prediabetes  >or=6.5%  Consistent with diabetes  High levels of fetal hemoglobin interfere with the HbA1C  assay. Heterozygous hemoglobin variants (HbS, HgC, etc)do  not significantly interfere with this assay.   However, presence of multiple variants may affect accuracy.         Statin: Not taking  ACE/ARB: Not taking  Screening or Prevention Patient's value Goal Complete/Controlled?   HgA1C Testing and Control   Lab Results   Component Value Date    HGBA1C 11.2 (H) 06/16/2021      Annually/Less than 8% Yes   Lipid profile Most Recent Lipid Panel Health Maintenance Topic Completion: Not Found Annually No   LDL control No results found for: LDLCALC Annually/Less than 100 mg/dl  No   Nephropathy screening No results found for: LABMICR  No results found for: PROTEINUA Annually No   Blood pressure BP Readings from Last 1 Encounters:   08/03/22 109/75    Less than 140/90 Yes   Dilated retinal exam Most Recent Eye Exam Date: Not Found Annually No   Foot exam   Most Recent  Foot Exam Date: Not Found Annually No     With regards to hypothyroidism:    Lab Results   Component Value Date    TSH 2.815 06/16/2021     Current Medication:  Levothyroxine 75mcg daily  Was out of medication for a few weeks. Recently restarted.     Takes thyroid medication properly without food first thing in the morning.    Current Symptoms:   - Weight gain  + Fatigue   - Constipation   - Hair loss  - Brittle nails  - Mental fog   - cp, palpations or sob  - Heat intolerance  - Cold intolerance    Review of Systems   as above    There were no vitals taken for this visit.    There is no height or weight on file to calculate BMI.    Lab Review:   Lab Results   Component Value Date    HGBA1C 11.2 (H) 06/16/2021    HGBA1C 7.8 (H) 07/07/2020       No results found for: CHOL, HDL, LDLCALC, TRIG, CHOLHDL  Lab Results   Component Value Date     12/30/2021    K 4.0 12/30/2021     12/30/2021    CO2 28 12/30/2021     (HH) 12/30/2021    BUN 11 12/30/2021    CREATININE 0.78 12/30/2021    CALCIUM 9.3 12/30/2021    PROT 7.7 12/30/2021    ALBUMIN 3.5 12/30/2021    BILITOT 0.4 12/30/2021    ALKPHOS 84 12/30/2021    AST 13 12/30/2021    ALT 30 12/30/2021    ANIONGAP 4 (L) 12/30/2021    ESTGFRAFRICA >60.0 12/30/2021    EGFRNONAA >60.0 12/30/2021    TSH 2.815 06/16/2021     Vit D, 25-Hydroxy   Date Value Ref Range Status   07/07/2020 34 30 - 96 ng/mL Final     Comment:     Vitamin D deficiency.........<10 ng/mL                              Vitamin D insufficiency......10-29 ng/mL       Vitamin D sufficiency........> or equal to 30 ng/mL  Vitamin D toxicity............>100 ng/mL       Assessment and Plan     1. 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 Hypothyroidism, unspecified type     3. Hypoglycemia unawareness associated with type 2 diabetes mellitus  blood-glucose meter,continuous (DEXCOM G6 ) Misc    blood-glucose sensor (DEXCOM G6 SENSOR) Elis    blood-glucose transmitter (DEXCOM G6 TRANSMITTER) Elis    glucagon (GVOKE HYPOPEN 2-PACK) 1 mg/0.2 mL AtIn   4. 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  Persistent hypoglycemia on Diazoxide until 2019.  Now transitioned to DM.    -- C peptide pending.  -- A1c goal <7.0%.  -- Medications discussed:  Insulin   -- Reviewed logs/CGM:  Global hyperglycemia.  Long discussion about documenting glucose.  Instructed to send glucose logs in 4-7 days.  Reach out to me sooner for any glucose <70 or consistently >200.  -- Patient would benefit from personal CGM. Reorder today - [facility].  -- Lost to follow up - making adjustments to basal insulin and noncompliant with prandial insulin. Basal/bolus mismatched.  -- Medication Changes:   CHANGE:  Levemir 15 units twice daily  Humalog 6 units plus correction scale before meals  Add correction scale if needed.  Blood sugar 180 to 230 add 1 units  Blood sugar 231 to 280 add 2 units  Blood sugar 281 to 330 add 3 units  Blood sugar 331 to 380 add 4 units  Blood sugar greater than 380 add 5 units    -- Reviewed goals of therapy are to get the best control we can without hypoglycemia.  -- Reviewed patient's current insulin regimen. Clarified proper insulin dose and timing in relation to meals, etc. Insulin injection sites and proper rotation instructed.    -- Advised frequent self blood glucose monitoring.  Patient encouraged to document glucose results and bring them to every clinic visit.  -- " Hypoglycemia precautions discussed. Instructed on precautions before driving.    -- Call for Bg repeatedly < 90 or > 180.   -- Close adherence to lifestyle changes recommended.   -- Periodic follow ups for eye evaluations, foot care and dental care suggested.    Hypothyroidism  -- Labs scheduled in 6 weeks.  -- Medication Changes:   CONTINUE:  Levothyroxine 75mcg daily  -- Clinically and biochemically euthyroid.  -- Goal is a normal TSH.  -- Avoid exogenous hyperthyroidism as this can accelerate bone loss and increase risk of CV complications.  -- Advised to take LT4 on an empty stomach with water and to wait 30-45 minutes before eating or taking other medications   -- Reviewed usual times of thyroid hormone changes  -- Reviewed that symptoms of hypothyroidism may not correlate with tsh, and a normal TSH is the goal of therapy. Symptoms are not a justification for over treatment.    Hypoglycemia unawareness associated with type 2 diabetes mellitus  -- Patient would benefit from personal CGM.    Noncompliance  -- Discussed DM, progression, and complications.  -- Encouraged compliance.  -- Discussed signs/symptoms of DKA, monitor and what would warrant patient to go to ED.  -- Gvoke ordered.     Follow up in about 4 weeks (around 9/7/2022).      I spent 30 minutes face-to-face with the patient, over half of the visit was spent on counseling and/or coordinating the care of the patient.    Counseling includes:  Diagnostic results, impressions, recommendations   Prognosis   Risk and benefits of management/treatment options   Instructions for management treatment and or follow-up   Importance of compliance with management   Risk factor reduction   Patient education      Patient has diabetes mellitus and manages diabetes with an intensive insulin regimen. The patient requires a therapeutic CGM and is willing to use therapeutic CGM for the necesary frequent adjustments of insulin therapy. Patient has been using SMBG for  frequent glucose monitoring (4+ times daily). I have completed an in-person visit during the previous 6 months and will continue to have in-person visits every 6 months to assess adherence to their CGM regimen and diabetes treatment plan.  Because of the COVID pandemic, I recommend dexcom for manage and monitor diabetes.

## 2022-08-09 ENCOUNTER — PATIENT MESSAGE (OUTPATIENT)
Dept: NEUROLOGY | Facility: CLINIC | Age: 22
End: 2022-08-09
Payer: MEDICAID

## 2022-08-09 ENCOUNTER — PATIENT MESSAGE (OUTPATIENT)
Dept: ENDOCRINOLOGY | Facility: CLINIC | Age: 22
End: 2022-08-09
Payer: MEDICAID

## 2022-08-09 DIAGNOSIS — G40.109 EPILEPSY, LOCALIZATION-RELATED: ICD-10-CM

## 2022-08-09 DIAGNOSIS — G44.221 CHRONIC TENSION-TYPE HEADACHE, INTRACTABLE: ICD-10-CM

## 2022-08-09 DIAGNOSIS — R42 DIZZINESS AND GIDDINESS: ICD-10-CM

## 2022-08-09 DIAGNOSIS — Z90.410 DIABETES MELLITUS SECONDARY TO PANCREATECTOMY: ICD-10-CM

## 2022-08-09 DIAGNOSIS — E13.9 DIABETES MELLITUS SECONDARY TO PANCREATECTOMY: ICD-10-CM

## 2022-08-09 DIAGNOSIS — E89.1 DIABETES MELLITUS SECONDARY TO PANCREATECTOMY: ICD-10-CM

## 2022-08-09 RX ORDER — OXCARBAZEPINE 600 MG/1
600 TABLET, FILM COATED ORAL 2 TIMES DAILY
Qty: 120 TABLET | Refills: 5 | Status: ON HOLD | OUTPATIENT
Start: 2022-08-09 | End: 2022-09-13 | Stop reason: HOSPADM

## 2022-08-09 RX ORDER — DIVALPROEX SODIUM 500 MG/1
500 TABLET, DELAYED RELEASE ORAL EVERY 12 HOURS
Qty: 60 TABLET | Refills: 5 | Status: SHIPPED | OUTPATIENT
Start: 2022-08-09 | End: 2023-02-09

## 2022-08-09 RX ORDER — LACOSAMIDE 150 MG/1
300 TABLET ORAL 2 TIMES DAILY
Qty: 360 TABLET | Refills: 5 | Status: SHIPPED | OUTPATIENT
Start: 2022-08-09 | End: 2023-02-09

## 2022-08-10 ENCOUNTER — OFFICE VISIT (OUTPATIENT)
Dept: ENDOCRINOLOGY | Facility: CLINIC | Age: 22
End: 2022-08-10
Payer: MEDICAID

## 2022-08-10 ENCOUNTER — PATIENT MESSAGE (OUTPATIENT)
Dept: ENDOCRINOLOGY | Facility: CLINIC | Age: 22
End: 2022-08-10

## 2022-08-10 DIAGNOSIS — Z90.410 DIABETES MELLITUS SECONDARY TO PANCREATECTOMY: Primary | ICD-10-CM

## 2022-08-10 DIAGNOSIS — Z91.199 NONCOMPLIANCE: ICD-10-CM

## 2022-08-10 DIAGNOSIS — E03.9 HYPOTHYROIDISM, UNSPECIFIED TYPE: ICD-10-CM

## 2022-08-10 DIAGNOSIS — E13.9 DIABETES MELLITUS SECONDARY TO PANCREATECTOMY: Primary | ICD-10-CM

## 2022-08-10 DIAGNOSIS — E11.649 HYPOGLYCEMIA UNAWARENESS ASSOCIATED WITH TYPE 2 DIABETES MELLITUS: ICD-10-CM

## 2022-08-10 DIAGNOSIS — E89.1 DIABETES MELLITUS SECONDARY TO PANCREATECTOMY: Primary | ICD-10-CM

## 2022-08-10 PROCEDURE — 1159F PR MEDICATION LIST DOCUMENTED IN MEDICAL RECORD: ICD-10-PCS | Mod: CPTII,95,, | Performed by: NURSE PRACTITIONER

## 2022-08-10 PROCEDURE — 99214 PR OFFICE/OUTPT VISIT, EST, LEVL IV, 30-39 MIN: ICD-10-PCS | Mod: 95,,, | Performed by: NURSE PRACTITIONER

## 2022-08-10 PROCEDURE — 99214 OFFICE O/P EST MOD 30 MIN: CPT | Mod: 95,,, | Performed by: NURSE PRACTITIONER

## 2022-08-10 PROCEDURE — 1160F RVW MEDS BY RX/DR IN RCRD: CPT | Mod: CPTII,95,, | Performed by: NURSE PRACTITIONER

## 2022-08-10 PROCEDURE — 1160F PR REVIEW ALL MEDS BY PRESCRIBER/CLIN PHARMACIST DOCUMENTED: ICD-10-PCS | Mod: CPTII,95,, | Performed by: NURSE PRACTITIONER

## 2022-08-10 PROCEDURE — 1159F MED LIST DOCD IN RCRD: CPT | Mod: CPTII,95,, | Performed by: NURSE PRACTITIONER

## 2022-08-10 RX ORDER — INSULIN DETEMIR 100 [IU]/ML
15 INJECTION, SOLUTION SUBCUTANEOUS 2 TIMES DAILY
Qty: 40 ML | Refills: 3 | Status: SHIPPED | OUTPATIENT
Start: 2022-08-10 | End: 2022-08-24 | Stop reason: SDUPTHER

## 2022-08-10 RX ORDER — GLUCAGON INJECTION, SOLUTION 1 MG/.2ML
1 INJECTION, SOLUTION SUBCUTANEOUS
Qty: 1 EACH | Refills: 3 | Status: SHIPPED | OUTPATIENT
Start: 2022-08-10 | End: 2022-08-24 | Stop reason: SDUPTHER

## 2022-08-10 RX ORDER — BLOOD-GLUCOSE TRANSMITTER
1 EACH MISCELLANEOUS CONTINUOUS PRN
Qty: 1 EACH | Status: SHIPPED | OUTPATIENT
Start: 2022-08-10 | End: 2023-08-10

## 2022-08-10 RX ORDER — PEN NEEDLE, DIABETIC 31 GX5/16"
NEEDLE, DISPOSABLE MISCELLANEOUS
Qty: 200 EACH | Refills: 3 | Status: SHIPPED | OUTPATIENT
Start: 2022-08-10 | End: 2022-08-24 | Stop reason: SDUPTHER

## 2022-08-10 RX ORDER — INSULIN LISPRO 100 [IU]/ML
6 INJECTION, SOLUTION INTRAVENOUS; SUBCUTANEOUS
Qty: 20 ML | Refills: 6 | Status: SHIPPED | OUTPATIENT
Start: 2022-08-10 | End: 2022-08-24 | Stop reason: SDUPTHER

## 2022-08-10 RX ORDER — BLOOD-GLUCOSE SENSOR
3 EACH MISCELLANEOUS CONTINUOUS PRN
Qty: 3 EACH | Status: SHIPPED | OUTPATIENT
Start: 2022-08-10 | End: 2023-08-10

## 2022-08-10 RX ORDER — BLOOD-GLUCOSE,RECEIVER,CONT
1 EACH MISCELLANEOUS CONTINUOUS PRN
Qty: 1 EACH | Status: SHIPPED | OUTPATIENT
Start: 2022-08-10 | End: 2023-08-10

## 2022-08-10 NOTE — ASSESSMENT & PLAN NOTE
-- Discussed DM, progression, and complications.  -- Encouraged compliance.  -- Discussed signs/symptoms of DKA, monitor and what would warrant patient to go to ED.  -- Gvoke ordered.   
-- Labs scheduled in 6 weeks.  -- Medication Changes:   CONTINUE:  Levothyroxine 75mcg daily  -- Clinically and biochemically euthyroid.  -- Goal is a normal TSH.  -- Avoid exogenous hyperthyroidism as this can accelerate bone loss and increase risk of CV complications.  -- Advised to take LT4 on an empty stomach with water and to wait 30-45 minutes before eating or taking other medications   -- Reviewed usual times of thyroid hormone changes  -- Reviewed that symptoms of hypothyroidism may not correlate with tsh, and a normal TSH is the goal of therapy. Symptoms are not a justification for over treatment.  
-- Patient would benefit from personal CGM.  
-- S/p 98% pancreatectomy at 6months old for  hyperinsulinism hypoglycemia.  Persistent hypoglycemia on Diazoxide until 2019.  Now transitioned to DM.    -- C peptide pending.  -- A1c goal <7.0%.  -- Medications discussed:  Insulin   -- Reviewed logs/CGM:  Global hyperglycemia.  Long discussion about documenting glucose.  Instructed to send glucose logs in 4-7 days.  Reach out to me sooner for any glucose <70 or consistently >200.  -- Patient would benefit from personal CGM. Reorder today - [facility].  -- Lost to follow up - making adjustments to basal insulin and noncompliant with prandial insulin. Basal/bolus mismatched.  -- Medication Changes:   CHANGE:  Levemir 15 units twice daily  Humalog 6 units plus correction scale before meals  Add correction scale if needed.  Blood sugar 180 to 230 add 1 units  Blood sugar 231 to 280 add 2 units  Blood sugar 281 to 330 add 3 units  Blood sugar 331 to 380 add 4 units  Blood sugar greater than 380 add 5 units    -- Reviewed goals of therapy are to get the best control we can without hypoglycemia.  -- Reviewed patient's current insulin regimen. Clarified proper insulin dose and timing in relation to meals, etc. Insulin injection sites and proper rotation instructed.    -- Advised frequent self blood glucose monitoring.  Patient encouraged to document glucose results and bring them to every clinic visit.  -- Hypoglycemia precautions discussed. Instructed on precautions before driving.    -- Call for Bg repeatedly < 90 or > 180.   -- Close adherence to lifestyle changes recommended.   -- Periodic follow ups for eye evaluations, foot care and dental care suggested.  
Moving from bed to chair

## 2022-08-11 ENCOUNTER — TELEPHONE (OUTPATIENT)
Dept: PHARMACY | Facility: CLINIC | Age: 22
End: 2022-08-11
Payer: MEDICAID

## 2022-08-11 ENCOUNTER — TELEPHONE (OUTPATIENT)
Dept: NEUROLOGY | Facility: CLINIC | Age: 22
End: 2022-08-11
Payer: MEDICAID

## 2022-08-11 ENCOUNTER — OFFICE VISIT (OUTPATIENT)
Dept: NEUROLOGY | Facility: CLINIC | Age: 22
End: 2022-08-11
Payer: MEDICAID

## 2022-08-11 DIAGNOSIS — G43.009 MIGRAINE WITHOUT AURA AND WITHOUT STATUS MIGRAINOSUS, NOT INTRACTABLE: ICD-10-CM

## 2022-08-11 DIAGNOSIS — G40.919 REFRACTORY EPILEPSY: ICD-10-CM

## 2022-08-11 DIAGNOSIS — G40.109 EPILEPSY, LOCALIZATION-RELATED: ICD-10-CM

## 2022-08-11 DIAGNOSIS — G44.221 CHRONIC TENSION-TYPE HEADACHE, INTRACTABLE: ICD-10-CM

## 2022-08-11 DIAGNOSIS — Z90.410 DIABETES MELLITUS SECONDARY TO PANCREATECTOMY: ICD-10-CM

## 2022-08-11 DIAGNOSIS — F79 INTELLECTUAL DISABILITY: ICD-10-CM

## 2022-08-11 DIAGNOSIS — G40.802 EPILEPSY WITH BOTH GENERALIZED AND FOCAL FEATURES: Primary | ICD-10-CM

## 2022-08-11 DIAGNOSIS — E13.9 DIABETES MELLITUS SECONDARY TO PANCREATECTOMY: ICD-10-CM

## 2022-08-11 DIAGNOSIS — E89.1 DIABETES MELLITUS SECONDARY TO PANCREATECTOMY: ICD-10-CM

## 2022-08-11 DIAGNOSIS — E11.649 HYPOGLYCEMIA UNAWARENESS ASSOCIATED WITH TYPE 2 DIABETES MELLITUS: ICD-10-CM

## 2022-08-11 DIAGNOSIS — R42 DIZZINESS AND GIDDINESS: ICD-10-CM

## 2022-08-11 PROCEDURE — 99214 PR OFFICE/OUTPT VISIT, EST, LEVL IV, 30-39 MIN: ICD-10-PCS | Mod: 95,,, | Performed by: NURSE PRACTITIONER

## 2022-08-11 PROCEDURE — 1160F RVW MEDS BY RX/DR IN RCRD: CPT | Mod: CPTII,95,, | Performed by: NURSE PRACTITIONER

## 2022-08-11 PROCEDURE — 99214 OFFICE O/P EST MOD 30 MIN: CPT | Mod: 95,,, | Performed by: NURSE PRACTITIONER

## 2022-08-11 PROCEDURE — 1159F MED LIST DOCD IN RCRD: CPT | Mod: CPTII,95,, | Performed by: NURSE PRACTITIONER

## 2022-08-11 PROCEDURE — 1159F PR MEDICATION LIST DOCUMENTED IN MEDICAL RECORD: ICD-10-PCS | Mod: CPTII,95,, | Performed by: NURSE PRACTITIONER

## 2022-08-11 PROCEDURE — 1160F PR REVIEW ALL MEDS BY PRESCRIBER/CLIN PHARMACIST DOCUMENTED: ICD-10-PCS | Mod: CPTII,95,, | Performed by: NURSE PRACTITIONER

## 2022-08-11 NOTE — TELEPHONE ENCOUNTER
Scheduled EMU via patient's mother, Jorge, on 9/6/22 at 11am, pre admit swab in clinic at 10am. Instructions thoroughly discussed, she is aware an adult is required to stay for the duration of this admission including overnight. Instructions mailed via [employer] and sent in portal.    The following Questions have been asked of the caregiver,Jorge , in regards to the patient being admitted to the EMU:    *Is Davis independent (Continent, ambulatory, communicative)? Yes      *Can Davis   follow directions? Yes      *Can Daivs   be left home alone? No- 2/2 seizures  *If yes, is there a time restriction?      *Does Davis  have any behavioral issues? No      *Has Davis  ever needed to be restrained while in the hospital? No    *Can Davis  tolerate being connected to lines 24/7 while here in the hospital? yes      Do you as Davis's caregiver feel he/she can tolerate the EMU for up to 7 days without any issues? yes

## 2022-08-11 NOTE — PROGRESS NOTES
Subjective:       Patient ID: Davis Duenas is a 21 y.o. female.    Chief Complaint: Epilepsy, Localization-related          HPI     The patient is new to me and here to establish care.      The patient started having seizure in infancy at 10 days old. 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 It was initially Ithought that hyperisulinism was the cause of the seizures. Underwent pancreatectomy at age of  6 months old at [facility] thinking that would improve her condition but it did not.  The patient was started on AED medications at the age of  18 months after being diagnosed with epilepsy. The patient would exhibit multiple types of seizure like episodes she would stare off into on direction and her  mouth eyes would twitch on one side and she would began to jerk and shake. Seizures start as staring followed by facial twitching and them grand mal seizures. Initially, she was prone to having repetitive and prolonged seizures. At baseline, she would have 3 seizures every 2 months on average.Routine EEGs have been normal except at age 15 when EEG was read abnormal. The patient also has mild intellectual disability but highly functioning. Prior AEDs: PB, DZP,  TPM, LEV, PHT,  LEV and   ZNS. Current AEDs:  mg BID,  mg po BID and VPA  mg BID. Adding VPA helped tremendously. Has not had a seizure since .    Adding VPA also helped eradicate her frequent severe headaches that improved with sleep and OTC NSAIDs.         2022 Patient present for Epilepsy management follow up accompanied by mother. Patient doing well. Patient last seizure occcured on 2021. Two months seizure free.  Patient reports she recall having a headache prior to event, she was putting Ice cream in the sink is the last thing she recall prior to falling to floor, grandmother witnessed her shaking and jerking, state it was very brief it stopped before she could  give rescue medication. Grandmother estimated it lasted 3 to 5 min. No tongue biting  no urinary incontinence. Mother states prior to June event she had an event on 02-19/ 02-20, 2022. 06- LCM level 12.7^ and OXC 15 NL.     02- THROUGH 02- (Interpreted 03-) AEEG 104 Hours PLE (SW at Pz) Diffuse Encephalopathy (Theta-Delta)      08-: Patient present for follow up related to multiple recent seizures, mother reports the patient had events on 08- and 08- witnessed by grandmother. The patient was complained of headache prior, to first event on 08-. Grandmother states she was shaking, and jerking, eyes closed, began drooling, the patient was given rescue Versed nasal, during event lasted approximately  5 minutes she was tired after the event disoriented, she did not bite her tongue and no urinary incontinence. Similar episode occurred 08-07-22 but no headache proceed event.  Patient taking the VPA  mg BID,   mg BID,  mg BID.     The originating site (patient location) is: Home.        The distant site (neurologist location) is: Neurology Clinic at [facility].        The chief complaint leading to consultation is: F/U Refractory Epilepsy uncontrolled      Visit type: Virtual visit with synchronous audio and video.        Total time spent with patient: 30 minutes         Special circumstances: This visit occurred during COVID-19 Pandemic Public Health Emergency.         Consent: The patient verbally consented to participating in the video visit and informed that may decline to receive medical services by telemedicine and may withdraw from such care at any time.        I discussed with the patient the nature of our telemedicine visits, that:        I  would evaluate the patient and recommend diagnostics and treatments based on my assessment.     Our sessions are not being recorded and that personal health information is protected.     Our  "team would provide follow up care in person if/when the patient needs it.     Virtual (video/telemedicine) visits have significant limitations. A telemedicine exam is primarily focused on the history and what I can observe. Several critical parts of the neurological exam cannot be performed.       Review of Systems   Constitutional: Positive for fatigue. Negative for appetite change.   HENT: Negative for hearing loss and tinnitus.    Eyes: Negative for photophobia and visual disturbance.   Respiratory: Negative for apnea and shortness of breath.    Cardiovascular: Negative for chest pain and palpitations.   Gastrointestinal: Negative for nausea and vomiting.   Endocrine: Negative for cold intolerance and heat intolerance.   Genitourinary: Negative for difficulty urinating and urgency.   Musculoskeletal: Negative for arthralgias, back pain, gait problem, joint swelling, myalgias, neck pain and neck stiffness.   Skin: Negative for color change and rash.   Allergic/Immunologic: Negative for environmental allergies and immunocompromised state.   Neurological: Positive for dizziness, seizures and headaches. Negative for tremors, syncope, facial asymmetry, speech difficulty, weakness, light-headedness and numbness.   Hematological: Negative for adenopathy. Does not bruise/bleed easily.   Psychiatric/Behavioral: Positive for sleep disturbance. Negative for agitation, behavioral problems, confusion, decreased concentration, dysphoric mood, hallucinations, self-injury and suicidal ideas. The patient is not hyperactive.                  Current Outpatient Medications:     BD ULTRA-FINE BEATRIZ PEN NEEDLE 32 gauge x 5/32" Ndle, Use with insulin 5 times a day., Disp: 200 each, Rfl: 3    blood sugar diagnostic Strp, To use to check blood sugar 4 times daily. Contour next meter, Disp: 200 strip, Rfl: 4    blood-glucose meter,continuous (DEXCOM G6 ) Misc, 1 each by Misc.(Non-Drug; Combo Route) route continuous prn., Disp: " 1 each, Rfl: PRN    blood-glucose sensor (DEXCOM G6 SENSOR) Elis, 3 each by Misc.(Non-Drug; Combo Route) route continuous prn. Change every 10 days., Disp: 3 each, Rfl: PRN    blood-glucose transmitter (DEXCOM G6 TRANSMITTER) Elis, 1 each by Misc.(Non-Drug; Combo Route) route continuous prn., Disp: 1 each, Rfl: PRN    CALCIUM PHOSPHATE ORAL, Take by mouth., Disp: , Rfl:     divalproex (DEPAKOTE) 500 MG TbEC, Take 1 tablet (500 mg total) by mouth every 12 (twelve) hours., Disp: 60 tablet, Rfl: 5    glucagon (GVOKE HYPOPEN 2-PACK) 1 mg/0.2 mL AtIn, Inject 1 mg into the skin as needed (Hypoglycemia)., Disp: 1 each, Rfl: 3    HUMALOG KWIKPEN INSULIN 100 unit/mL pen, Inject 6 Units into the skin 3 (three) times daily before meals. Plus correction scale as directed. Max TDD 50units., Disp: 20 mL, Rfl: 6    lacosamide (VIMPAT) 150 mg Tab tablet, Take 2 tablets (300 mg total) by mouth 2 (two) times daily., Disp: 360 tablet, Rfl: 5    lansoprazole (PREVACID) 15 MG capsule, Take 15 mg by mouth once daily., Disp: , Rfl:     LEVEMIR FLEXTOUCH U-100 INSULN 100 unit/mL (3 mL) InPn pen, Inject 15 Units into the skin 2 (two) times daily., Disp: 40 mL, Rfl: 3    levocetirizine (XYZAL) 5 MG tablet, Take 5 mg by mouth., Disp: , Rfl:     levothyroxine (SYNTHROID) 75 MCG tablet, Take 1 tablet (75 mcg total) by mouth before breakfast., Disp: 90 tablet, Rfl: 0    multivitamin (THERAGRAN) per tablet, daily., Disp: , Rfl:     omeprazole (PRILOSEC) 20 MG capsule, Take 20 mg by mouth once daily., Disp: , Rfl:     ondansetron (ZOFRAN-ODT) 8 MG TbDL, DISSOLVE ONE TABLET BY MOUTH EVERY 8 HOURS AS NEEDED FOR NAUSEA AND VOMITING, Disp: , Rfl:     OXcarbazepine (TRILEPTAL) 600 MG Tab, Take 1 tablet (600 mg total) by mouth 2 (two) times daily., Disp: 120 tablet, Rfl: 5    urine glucose-ketones test (KETO-DIASTIX) Strp, Use as directed., Disp: 50 each, Rfl: 6  Past Medical History:   Diagnosis Date    Hyperinsulinism     Seizures       No past surgical history on file.  Social History     Socioeconomic History    Marital status: Single   Tobacco Use    Smoking status: Never Smoker    Smokeless tobacco: Never Used   Substance and Sexual Activity    Alcohol use: Never    Drug use: Never    Sexual activity: Never     Partners: Male             Past/Current Medical/Surgical History, Past/Current Social History, Past/Current Family History and Past/Current Medications were reviewed in detail.        Objective:           VITAL SIGNS WERE REVIEWED      GENERAL APPEARANCE:     The patient looks comfortable.    BMI 24.73 KG    No signs of respiratory distress.    Normal breathing pattern.    No dysmorphic features    Normal eye contact.     GENERAL MEDICAL EXAM:    HEENT:  Head is atraumatic normocephalic. Fundoscopic (Ophthalmoscopic) exam showed no disc edema.      Neck and Axillae: No JVD. No visible lesions.    Cardiopulmonary: No cyanosis. No tachypnea. Normal respiratory effort.    Gastrointestinal/Urogenital:  No jaundice. No stomas or lesions. No visible hernias. No catheters.     Skin, Hair and Nails: No pathognonomic skin rash. No neurofibromatosis. No visible lesions.No stigmata of autoimmune disease. No clubbing.    Limbs: No varicose veins. No visible swelling.    Muskoskeletal: No visible deformities.No visible lesions.           Neurologic Exam     Mental Status   Oriented to person, place, and time.   Registration: recalls 3 of 3 objects. Recall at 5 minutes: recalls 3 of 3 objects. Follows 3 step commands.   Attention: normal. Concentration: normal.   Speech: speech is normal   Level of consciousness: alert  Knowledge: good and consistent with education. Able to perform simple calculations.   Able to name object. Able to read. Able to repeat. Able to write. Normal comprehension.     Cranial Nerves   Cranial nerves II through XII intact.     CN II   Visual fields full to confrontation.   Visual acuity: normal  Right visual field  deficit: none  Left visual field deficit: none     CN III, IV, VI   Pupils are equal, round, and reactive to light.  Extraocular motions are normal.   Right pupil: Size: 2 mm. Shape: regular. Reactivity: brisk. Consensual response: intact. Accommodation: intact.   Left pupil: Size: 2 mm. Shape: regular. Reactivity: brisk. Consensual response: intact. Accommodation: intact.   CN III: no CN III palsy  CN VI: no CN VI palsy  Nystagmus: none   Diplopia: none  Ophthalmoparesis: none  Upgaze: normal  Downgaze: normal  Conjugate gaze: present  Vestibulo-ocular reflex: present    CN V   Facial sensation intact.   Right facial sensation deficit: none  Left facial sensation deficit: none    CN VII   Facial expression full, symmetric.   Right facial weakness: none  Left facial weakness: none    CN VIII   CN VIII normal.   Hearing: intact    CN IX, X   CN IX normal.   CN X normal.   Palate: symmetric    CN XI   CN XI normal.   Right sternocleidomastoid strength: normal  Left sternocleidomastoid strength: normal  Right trapezius strength: normal  Left trapezius strength: normal    CN XII   CN XII normal.   Tongue: not atrophic  Fasciculations: absent  Tongue deviation: none    Motor Exam   Muscle bulk: normal  Overall muscle tone: normal  Right arm tone: normal  Left arm tone: normal  Right arm pronator drift: absent  Left arm pronator drift: absent  Right leg tone: normal  Left leg tone: normal    Strength   Strength 5/5 throughout.   Right neck flexion: 5/5  Left neck flexion: 5/5  Right neck extension: 5/5  Left neck extension: 5/5  Right deltoid: 5/5  Left deltoid: 5/5  Right biceps: 5/5  Left biceps: 5/5  Right triceps: 5/5  Left triceps: 5/5  Right wrist flexion: 5/5  Left wrist flexion: 5/5  Right wrist extension: 5/5  Left wrist extension: 5/5  Right interossei: 5/5  Left interossei: 5/5  Right iliopsoas: 5/5  Left iliopsoas: 5/5  Right quadriceps: 5/5  Left quadriceps: 5/5  Right hamstrin/5  Left hamstring:  5/5  Right glutei: 5/5  Left glutei: 5/5  Right anterior tibial: 5/5  Left anterior tibial: 5/5  Right posterior tibial: 5/5  Left posterior tibial: 5/5  Right peroneal: 5/5  Left peroneal: 5/5  Right gastroc: 5/5  Left gastroc: 5/5    Sensory Exam   Light touch normal.   Right arm light touch: normal  Left arm light touch: normal  Right leg light touch: normal  Left leg light touch: normal  Vibration normal.   Right arm vibration: normal  Left arm vibration: normal  Right leg vibration: normal  Left leg vibration: normal  Proprioception normal.   Right arm proprioception: normal  Left arm proprioception: normal  Right leg proprioception: normal  Left leg proprioception: normal  Pinprick normal.   Right arm pinprick: normal  Left arm pinprick: normal  Right leg pinprick: normal  Left leg pinprick: normal  Graphesthesia: normal  Stereognosis: normal    Gait, Coordination, and Reflexes     Gait  Gait: normal    Coordination   Romberg: negative  Finger to nose coordination: normal  Heel to shin coordination: normal  Tandem walking coordination: normal    Tremor   Resting tremor: absent  Intention tremor: absent  Action tremor: absent    Reflexes   Right brachioradialis: 2+  Left brachioradialis: 2+  Right biceps: 2+  Left biceps: 2+  Right triceps: 2+  Left triceps: 2+  Right patellar: 2+  Left patellar: 2+  Right achilles: 2+  Left achilles: 2+  Right plantar: normal  Left plantar: normal  Right Pendleton: absent  Left Pendleton: absent  Right ankle clonus: absent  Left ankle clonus: absent  Right pendular knee jerk: absent  Left pendular knee jerk: absent      Lab Results   Component Value Date    WBC 6.64 12/30/2021    HGB 13.8 12/30/2021    HCT 40.7 12/30/2021    MCV 96 12/30/2021     (L) 12/30/2021     Sodium   Date Value Ref Range Status   12/30/2021 136 136 - 145 mmol/L Final     Potassium   Date Value Ref Range Status   12/30/2021 4.0 3.5 - 5.1 mmol/L Final     Chloride   Date Value Ref Range Status    12/30/2021 104 95 - 110 mmol/L Final     CO2   Date Value Ref Range Status   12/30/2021 28 23 - 29 mmol/L Final     Glucose   Date Value Ref Range Status   12/30/2021 482 (HH) 70 - 110 mg/dL Final     Comment:     GLU critical result(s) called and verbal readback obtained from Nai CAMP at 1546 by Ericka Tamayo MLS. by EKS 12/30/2021 15:46       BUN   Date Value Ref Range Status   12/30/2021 11 6 - 20 mg/dL Final     Creatinine   Date Value Ref Range Status   12/30/2021 0.78 0.50 - 1.40 mg/dL Final     Calcium   Date Value Ref Range Status   12/30/2021 9.3 8.7 - 10.5 mg/dL Final     Total Protein   Date Value Ref Range Status   12/30/2021 7.7 6.0 - 8.4 g/dL Final     Albumin   Date Value Ref Range Status   12/30/2021 3.5 3.5 - 5.2 g/dL Final     Total Bilirubin   Date Value Ref Range Status   12/30/2021 0.4 0.1 - 1.0 mg/dL Final     Comment:     For infants and newborns, interpretation of results should be based  on gestational age, weight and in agreement with clinical  observations.    Premature Infant recommended reference ranges:  Up to 24 hours.............<8.0 mg/dL  Up to 48 hours............<12.0 mg/dL  3-5 days..................<15.0 mg/dL  6-29 days.................<15.0 mg/dL    For patients on Eltrombopag therapy, use of Dimension Kennard TBIL is   not   recommended.       Alkaline Phosphatase   Date Value Ref Range Status   12/30/2021 84 55 - 135 U/L Final     AST   Date Value Ref Range Status   12/30/2021 13 10 - 40 U/L Final     ALT   Date Value Ref Range Status   12/30/2021 30 10 - 44 U/L Final     Anion Gap   Date Value Ref Range Status   12/30/2021 4 (L) 8 - 16 mmol/L Final     eGFR if    Date Value Ref Range Status   12/30/2021 >60.0 >60 mL/min/1.73 m^2 Final     eGFR if non    Date Value Ref Range Status   12/30/2021 >60.0 >60 mL/min/1.73 m^2 Final     Comment:     Calculation used to obtain the estimated glomerular filtration  rate (eGFR) is the CKD-EPI equation.         No results found for: [id number]  Lab Results   Component Value Date    TSH 2.815 06/16/2021 06-      EEG read as abnormal.    07-    EEG NL     06-    Brain MRI NL      11-    EEG NL       02- THROUGH 02- (Interpreted 03-)    AEEG 104 Hours PLE (SW at Pz) Diffuse Encephalopathy (Theta-Delta)      AED:  LCM NORMAL LEVEL RANGE: 01-10   DATE LEVEL    11- 15.5     06-  12.7                                    AED: OXC NORMAL LEVEL RANGE: 10-35   DATE LEVEL    11-  16     06-  15                                    AED: VPA  NORMAL LEVEL RANGE:    DATE LEVEL    11-  47.9 L     06-  79.8 NL                                             Reviewed the neuroimaging independently       Assessment:       1. Epilepsy with both generalized and focal features    2. Epilepsy, localization-related    3. Dizziness and giddiness    4. Chronic tension-type headache, intractable    5. Refractory epilepsy    6. Migraine without aura and without status migrainosus, not intractable    7. Intellectual disability            EPILEPSY CLASSIFICATION     SEMIOLOGY: DIALEPTIC (FOCA-CRISTIAN)-->GTC      EPILEPTOGENIC ZONE (S): UNKNOWN     ETIOLOGY: UNKNOWN      PRIOR AEDS: PB, LEV, DZP, TPM, ZNS, PHT      CURRENT AEDS: LCM, OXC, VPA      LAST SEIZURE DATE: 08-        COMPREHENSIVE LIST OF AEDs:      Acetazolamide (AZM-Diamox)   Benzos: clonazepam (CZP Klonopin), lorazepam (LZP-Ativan), diazepam (DZP-Valium), clorazepate (CLZ- Tranxene)  Brivaracetam (BRV-Briviact)  Cannabidiol (CBD- Epidiolex)  Carbamazepine (CBZ-Tegretol)  Cenobamate (CNB-Xcopri)  Clobazam (CLB-Onfi)  Eslicarbazepine (ESL-Aptiom)  Ethosuximide (ESX-Zarontin)  Felbamate (FBM-Felbatol)  Gabapentin (GBP-Neurontin)  Lacosamide (LCM-Vimpat)   Lamotrigine (LTG-Lamitcal)  Levetiracetam (LEV- Keppra)  Oxcarbazepine (OXC-Trileptal)   Perampanel (PML-Fycompa)  Phenobarbital  (PB)  Phenytoin (PHT-Dilantin)  Pregabalin (PGB-Lyrica)  Primidone (PRM)   Retigabine (RTG- Potiga) Discontinued in 2017  Rufinamide (RFN-Benzil)  Stiripentol (STP-Diacomit)  Tiagabine (TGB-Gabitril)  Topiramate (TPM-Topamax)  Valproate (VPA-Depakote)  Vigabatrin (VGB-Sabril)  Zonisamide (ZNS-Zonegran)    Plan:       EPILEPSY, MEDICALLY REFRACTORY, INFANCY ONSET, WITH MILD INTELLECTUAL DISABILITY      Last seizure 08/07/2022  proceed with EMU referral for proper localization and management     The patient was encouraged to maintain full traditional seizure precautions which include but not limited to avoid driving, avoid high altitudes, avoid being close to fire or fire source, avoid being close to a body of water or swimming alone, avoid operating heavy machinery and avoid using sharp objects if possible. The patient was encouraged to shower (without accumulation of water) instead of taking a bath if unsupervised. The patient was made aware that these precautions are especially important during concurrent illness. Adequate sleep and avoidance of alcohol as important measures to assure good seizure control were discussed with the patient. The patient was also advised not to care for children without company. The patient was advised to pad the side rails with pillows and blankets if applicable.I strongly recommended lowering the bed to the floor level to decrease the risk of falls.     Continue AEDs INDEFINITELY, FOR GOOD AND NEVER SKIP A DOSE. The patient verbalized full understanding. Stressed the extreme medical, personal safety, public safety and legal importance of compliance and seizure control. Will give as many refills as possible with or without face-to-face evaluation to make sure the patient and any patient with epilepsy will never run out of medications. Running out of seizure medications should never happen under our care. I explained to the patient that he should not, under any circumstances, adjust or  stop taking his seizure medication without discussing with me. The patient verbalized full understanding.       Explained to the patient and family that if 2 AEDs fail to control the seizures the likelihood of AEDs alone to control the seizures is <10% and other other options should be pursued. Recommend Epileptic surgery.     Continue VPA  mg BID. Check level 6 months     Continue  mg BID. Check level 6 months     Continue  mg BID. Check level 6 months     Would like to simplify her regimen. Since she is doing well will continue current regimen.     Continue NS Midazolam (Nayzilam) PRN for clustered and/or prolonged seizures.       AVOID any substance that could lower seizure threshold including but not limited to:      ALCOHOL AND WITHDRAWAL      TRAMADOL.     DEMEROL      ALL STIMULANTS-ALL ADHD MEDICATIONS.      CLOZAPINE.      BUPROPION.     CIPROFLOXACIN          COMMON MIGRAINE SYNDROME, EPISODIC, HIGH FREQUENCY          HEADACHE DIARY     DISCUSSED THE THREE-FOLD MANAGEMENT OF MIGRAINE:      LIFESTYLE CHANGES:     Good sleep hygiene  Avoid general triggers like lack of sleep/too much sleep, prolonged sun exposure, excessive screen time and specific triggers based on you own diary   Minimize physical and emotional stress  Smoking avoidance and cessation  Limit caffeine drinks to 1-2 a day   Good hydration   Small frequent meals and avoid skipping meals   Moderate 30-minute-long aerobic exercises 3 times/week. Avoid strenuous exercise       ABORTIVE MEDICATIONS:     Should only be taken 2-3 times/week to avoid rebound and overuse headaches.    OTC NSAIDs work well and the headaches have subsided.       NEXT OPTIONS:    Triptans    Gepants: Nuretc (rimegepant)75 mg >Ubrelvy (ubrogepant) 100 mg    Ditans: Reyvow (lasmiditan) 100 mg (No driving due to sedation)    Fioricet without codeine with Reglan.      Prednisone with Reglan.      LAST RESORT:     DHE NS Trudhesa (Max 2 a week)     C/I:  concomitant use of vasoconstrictors like Triptans, strong CY inhibitors such as HAART PIs (eg, ritonavir, nelfinavir, or indinavir) and Macrolides (eg, erythromycin or clarithromycin), CAD, PVD, Stroke/TIA and Uncontrolled HTN.  Serious SEs include Vasospasm and Fibrosis (chronic use).       PREVENTATIVE MEDICATIONS:       Continue VPA  mg BID.     Failed TPM , ZNS, OXC.       NEXT OPTIONS:     Amitriptyline/Elavil.    Propranolol/Inderal.    Lamotrigine/Lamictal     ANTI-CGRP AGENTS:     Qulipta (alogepant) 60 mg QD    Erenumab (Aimovig) 140 mg SQ Pen monthly (Reported cases of Constipation and BP elevation)     Galcanezumab (Emgality) 120 mg SQ Pen monthly after a loading dose of 240 mg     Fremnezumab (Ajovy) (Ligand Blocker): 225 mg SQ monthly or 675 mg every 3 months       LAST RESORT OPTIONS:      Botox 200 units every 3 months     Namenda 10 mg BID     Neuromodulation: Cefaly, Relivion     Migraine surgery.        ALTERNATIVE OPTIONS:     Helpful supplements include Co-Q 10, B2, Mg, Feverfew (Dolovent combination) and butterbur (Petadolex)    Acupuncture, massage therapy and essential oils.               MEDICAL/SURGICAL COMORBIDITIES     All relevant medical comorbidities noted and managed by primary care physician and medical care team.          MISCELLANEOUS MEDICAL PROBLEMS       HEALTHY LIFESTYLE AND PREVENTATIVE CARE    The patient to adhere to the age-appropriate health maintenance guidelines including screening tests and vaccinations. The patient to adhere to  healthy lifestyle, optimal weight, exercise, healthy diet, good sleep hygiene and avoiding drugs including smoking, alcohol and recreational drugs.        RTC in 3 months        Tata Villanueva, MSN NP      Collaborating Provider: Jorge Luis Piña MD, FAAN Neurologist/Epileptologist    E/M 30 Min

## 2022-08-12 ENCOUNTER — PATIENT MESSAGE (OUTPATIENT)
Dept: ENDOCRINOLOGY | Facility: CLINIC | Age: 22
End: 2022-08-12
Payer: MEDICAID

## 2022-08-17 ENCOUNTER — PATIENT MESSAGE (OUTPATIENT)
Dept: ENDOCRINOLOGY | Facility: CLINIC | Age: 22
End: 2022-08-17
Payer: MEDICAID

## 2022-09-03 ENCOUNTER — PATIENT MESSAGE (OUTPATIENT)
Dept: ENDOCRINOLOGY | Facility: CLINIC | Age: 22
End: 2022-09-03
Payer: MEDICAID

## 2022-09-06 ENCOUNTER — HOSPITAL ENCOUNTER (INPATIENT)
Facility: HOSPITAL | Age: 22
LOS: 7 days | Discharge: HOME | DRG: 101 | End: 2022-09-13
Attending: PSYCHIATRY & NEUROLOGY | Admitting: PSYCHIATRY & NEUROLOGY
Payer: MEDICAID

## 2022-09-06 ENCOUNTER — LAB VISIT (OUTPATIENT)
Dept: INTERNAL MEDICINE | Facility: CLINIC | Age: 22
DRG: 101 | End: 2022-09-06
Payer: MEDICAID

## 2022-09-06 DIAGNOSIS — G40.919 REFRACTORY EPILEPSY: ICD-10-CM

## 2022-09-06 DIAGNOSIS — E13.9 DIABETES MELLITUS SECONDARY TO PANCREATECTOMY: ICD-10-CM

## 2022-09-06 DIAGNOSIS — Z11.52 ENCOUNTER FOR SCREENING FOR COVID-19: Primary | ICD-10-CM

## 2022-09-06 DIAGNOSIS — G40.802 EPILEPSY WITH BOTH GENERALIZED AND FOCAL FEATURES: ICD-10-CM

## 2022-09-06 DIAGNOSIS — E89.1 DIABETES MELLITUS SECONDARY TO PANCREATECTOMY: ICD-10-CM

## 2022-09-06 DIAGNOSIS — Z90.410 DIABETES MELLITUS SECONDARY TO PANCREATECTOMY: ICD-10-CM

## 2022-09-06 DIAGNOSIS — R56.9 SEIZURE-LIKE ACTIVITY: ICD-10-CM

## 2022-09-06 DIAGNOSIS — G40.109 FOCAL EPILEPSY: Primary | ICD-10-CM

## 2022-09-06 LAB
ALBUMIN SERPL BCP-MCNC: 3.7 G/DL (ref 3.5–5.2)
ALP SERPL-CCNC: 61 U/L (ref 55–135)
ALT SERPL W/O P-5'-P-CCNC: 13 U/L (ref 10–44)
AMPHET+METHAMPHET UR QL: NEGATIVE
ANION GAP SERPL CALC-SCNC: 9 MMOL/L (ref 8–16)
AST SERPL-CCNC: 23 U/L (ref 10–40)
B-HCG UR QL: NEGATIVE
BARBITURATES UR QL SCN>200 NG/ML: NEGATIVE
BASOPHILS # BLD AUTO: 0.03 K/UL (ref 0–0.2)
BASOPHILS NFR BLD: 0.4 % (ref 0–1.9)
BENZODIAZ UR QL SCN>200 NG/ML: NEGATIVE
BILIRUB SERPL-MCNC: 0.3 MG/DL (ref 0.1–1)
BUN SERPL-MCNC: 16 MG/DL (ref 6–20)
BZE UR QL SCN: NEGATIVE
CALCIUM SERPL-MCNC: 9.2 MG/DL (ref 8.7–10.5)
CANNABINOIDS UR QL SCN: NEGATIVE
CHLORIDE SERPL-SCNC: 103 MMOL/L (ref 95–110)
CO2 SERPL-SCNC: 24 MMOL/L (ref 23–29)
CREAT SERPL-MCNC: 0.7 MG/DL (ref 0.5–1.4)
CREAT UR-MCNC: 341 MG/DL (ref 15–325)
CTP QC/QA: YES
DIFFERENTIAL METHOD: ABNORMAL
EOSINOPHIL # BLD AUTO: 0.2 K/UL (ref 0–0.5)
EOSINOPHIL NFR BLD: 2.1 % (ref 0–8)
ERYTHROCYTE [DISTWIDTH] IN BLOOD BY AUTOMATED COUNT: 11.6 % (ref 11.5–14.5)
EST. GFR  (NO RACE VARIABLE): >60 ML/MIN/1.73 M^2
ESTIMATED AVG GLUCOSE: 232 MG/DL (ref 68–131)
GLUCOSE SERPL-MCNC: 129 MG/DL (ref 70–110)
HBA1C MFR BLD: 9.7 % (ref 4–5.6)
HCT VFR BLD AUTO: 39.3 % (ref 37–48.5)
HGB BLD-MCNC: 14 G/DL (ref 12–16)
IMM GRANULOCYTES # BLD AUTO: 0.03 K/UL (ref 0–0.04)
IMM GRANULOCYTES NFR BLD AUTO: 0.4 % (ref 0–0.5)
LYMPHOCYTES # BLD AUTO: 2.3 K/UL (ref 1–4.8)
LYMPHOCYTES NFR BLD: 30.1 % (ref 18–48)
MCH RBC QN AUTO: 34.7 PG (ref 27–31)
MCHC RBC AUTO-ENTMCNC: 35.6 G/DL (ref 32–36)
MCV RBC AUTO: 98 FL (ref 82–98)
METHADONE UR QL SCN>300 NG/ML: NEGATIVE
MONOCYTES # BLD AUTO: 0.8 K/UL (ref 0.3–1)
MONOCYTES NFR BLD: 11.1 % (ref 4–15)
NEUTROPHILS # BLD AUTO: 4.2 K/UL (ref 1.8–7.7)
NEUTROPHILS NFR BLD: 55.9 % (ref 38–73)
NRBC BLD-RTO: 0 /100 WBC
OPIATES UR QL SCN: NEGATIVE
PCP UR QL SCN>25 NG/ML: NEGATIVE
PLATELET # BLD AUTO: 111 K/UL (ref 150–450)
PMV BLD AUTO: 11.9 FL (ref 9.2–12.9)
POCT GLUCOSE: 133 MG/DL (ref 70–110)
POCT GLUCOSE: 234 MG/DL (ref 70–110)
POCT GLUCOSE: 265 MG/DL (ref 70–110)
POTASSIUM SERPL-SCNC: 4.1 MMOL/L (ref 3.5–5.1)
PROT SERPL-MCNC: 7.3 G/DL (ref 6–8.4)
RBC # BLD AUTO: 4.03 M/UL (ref 4–5.4)
SARS-COV-2 RDRP RESP QL NAA+PROBE: NEGATIVE
SODIUM SERPL-SCNC: 136 MMOL/L (ref 136–145)
TOXICOLOGY INFORMATION: ABNORMAL
VALPROATE SERPL-MCNC: 59.4 UG/ML (ref 50–100)
WBC # BLD AUTO: 7.57 K/UL (ref 3.9–12.7)

## 2022-09-06 PROCEDURE — 85025 COMPLETE CBC W/AUTO DIFF WBC: CPT

## 2022-09-06 PROCEDURE — G0379 DIRECT REFER HOSPITAL OBSERV: HCPCS

## 2022-09-06 PROCEDURE — 93010 EKG 12-LEAD: ICD-10-PCS | Mod: ,,, | Performed by: INTERNAL MEDICINE

## 2022-09-06 PROCEDURE — 93010 ELECTROCARDIOGRAM REPORT: CPT | Mod: ,,, | Performed by: INTERNAL MEDICINE

## 2022-09-06 PROCEDURE — 99223 1ST HOSP IP/OBS HIGH 75: CPT | Mod: ,,, | Performed by: PSYCHIATRY & NEUROLOGY

## 2022-09-06 PROCEDURE — 63600175 PHARM REV CODE 636 W HCPCS

## 2022-09-06 PROCEDURE — G0378 HOSPITAL OBSERVATION PER HR: HCPCS

## 2022-09-06 PROCEDURE — 11000001 HC ACUTE MED/SURG PRIVATE ROOM

## 2022-09-06 PROCEDURE — 36415 COLL VENOUS BLD VENIPUNCTURE: CPT

## 2022-09-06 PROCEDURE — 83036 HEMOGLOBIN GLYCOSYLATED A1C: CPT

## 2022-09-06 PROCEDURE — 80053 COMPREHEN METABOLIC PANEL: CPT

## 2022-09-06 PROCEDURE — 99223 PR INITIAL HOSPITAL CARE,LEVL III: ICD-10-PCS | Mod: ,,, | Performed by: PSYCHIATRY & NEUROLOGY

## 2022-09-06 PROCEDURE — 80235 DRUG ASSAY LACOSAMIDE: CPT

## 2022-09-06 PROCEDURE — 95714 VEEG EA 12-26 HR UNMNTR: CPT

## 2022-09-06 PROCEDURE — 96372 THER/PROPH/DIAG INJ SC/IM: CPT

## 2022-09-06 PROCEDURE — U0002 COVID-19 LAB TEST NON-CDC: HCPCS | Mod: PBBFAC

## 2022-09-06 PROCEDURE — 25000003 PHARM REV CODE 250

## 2022-09-06 PROCEDURE — 80164 ASSAY DIPROPYLACETIC ACD TOT: CPT

## 2022-09-06 PROCEDURE — 80183 DRUG SCRN QUANT OXCARBAZEPIN: CPT

## 2022-09-06 PROCEDURE — 95720 PR EEG, W/VIDEO, CONT RECORD, I&R, >12<26 HRS: ICD-10-PCS | Mod: ,,, | Performed by: PSYCHIATRY & NEUROLOGY

## 2022-09-06 PROCEDURE — 80307 DRUG TEST PRSMV CHEM ANLYZR: CPT

## 2022-09-06 PROCEDURE — 81025 URINE PREGNANCY TEST: CPT

## 2022-09-06 PROCEDURE — 93005 ELECTROCARDIOGRAM TRACING: CPT

## 2022-09-06 PROCEDURE — 95700 EEG CONT REC W/VID EEG TECH: CPT

## 2022-09-06 PROCEDURE — 95720 EEG PHY/QHP EA INCR W/VEEG: CPT | Mod: ,,, | Performed by: PSYCHIATRY & NEUROLOGY

## 2022-09-06 RX ORDER — DIVALPROEX SODIUM 250 MG/1
500 TABLET, DELAYED RELEASE ORAL EVERY 12 HOURS
Status: DISCONTINUED | OUTPATIENT
Start: 2022-09-06 | End: 2022-09-07

## 2022-09-06 RX ORDER — IBUPROFEN 200 MG
16 TABLET ORAL
Status: DISCONTINUED | OUTPATIENT
Start: 2022-09-06 | End: 2022-09-08

## 2022-09-06 RX ORDER — SODIUM CHLORIDE 0.9 % (FLUSH) 0.9 %
10 SYRINGE (ML) INJECTION
Status: DISCONTINUED | OUTPATIENT
Start: 2022-09-06 | End: 2022-09-13 | Stop reason: HOSPADM

## 2022-09-06 RX ORDER — OXCARBAZEPINE 300 MG/1
600 TABLET, FILM COATED ORAL 2 TIMES DAILY
Status: DISCONTINUED | OUTPATIENT
Start: 2022-09-06 | End: 2022-09-07

## 2022-09-06 RX ORDER — CETIRIZINE HYDROCHLORIDE 5 MG/1
10 TABLET ORAL NIGHTLY
Status: DISCONTINUED | OUTPATIENT
Start: 2022-09-06 | End: 2022-09-13 | Stop reason: HOSPADM

## 2022-09-06 RX ORDER — INSULIN ASPART 100 [IU]/ML
0-5 INJECTION, SOLUTION INTRAVENOUS; SUBCUTANEOUS
Status: DISCONTINUED | OUTPATIENT
Start: 2022-09-06 | End: 2022-09-08

## 2022-09-06 RX ORDER — LACOSAMIDE 100 MG/1
100 TABLET ORAL 2 TIMES DAILY
Status: DISCONTINUED | OUTPATIENT
Start: 2022-09-06 | End: 2022-09-08

## 2022-09-06 RX ORDER — CETIRIZINE HYDROCHLORIDE 10 MG/1
10 TABLET ORAL DAILY
COMMUNITY

## 2022-09-06 RX ORDER — INSULIN ASPART 100 [IU]/ML
10 INJECTION, SOLUTION INTRAVENOUS; SUBCUTANEOUS
Status: CANCELLED | OUTPATIENT
Start: 2022-09-06

## 2022-09-06 RX ORDER — GLUCAGON 1 MG
1 KIT INJECTION
Status: DISCONTINUED | OUTPATIENT
Start: 2022-09-06 | End: 2022-09-08

## 2022-09-06 RX ORDER — DOCUSATE SODIUM 100 MG/1
100 CAPSULE, LIQUID FILLED ORAL 2 TIMES DAILY
Status: DISCONTINUED | OUTPATIENT
Start: 2022-09-06 | End: 2022-09-07

## 2022-09-06 RX ORDER — IBUPROFEN 200 MG
16 TABLET ORAL
Status: CANCELLED | OUTPATIENT
Start: 2022-09-06

## 2022-09-06 RX ORDER — GLUCAGON 1 MG
1 KIT INJECTION
Status: CANCELLED | OUTPATIENT
Start: 2022-09-06

## 2022-09-06 RX ORDER — INSULIN ASPART 100 [IU]/ML
10 INJECTION, SOLUTION INTRAVENOUS; SUBCUTANEOUS
Status: DISCONTINUED | OUTPATIENT
Start: 2022-09-06 | End: 2022-09-07

## 2022-09-06 RX ORDER — ACETAMINOPHEN 325 MG/1
650 TABLET ORAL EVERY 4 HOURS PRN
Status: DISCONTINUED | OUTPATIENT
Start: 2022-09-06 | End: 2022-09-13 | Stop reason: HOSPADM

## 2022-09-06 RX ORDER — IBUPROFEN 200 MG
24 TABLET ORAL
Status: DISCONTINUED | OUTPATIENT
Start: 2022-09-06 | End: 2022-09-08

## 2022-09-06 RX ORDER — IBUPROFEN 200 MG
24 TABLET ORAL
Status: CANCELLED | OUTPATIENT
Start: 2022-09-06

## 2022-09-06 RX ORDER — LORAZEPAM 2 MG/ML
2 INJECTION INTRAMUSCULAR EVERY 5 MIN PRN
Status: DISCONTINUED | OUTPATIENT
Start: 2022-09-06 | End: 2022-09-13 | Stop reason: HOSPADM

## 2022-09-06 RX ADMIN — LACOSAMIDE 100 MG: 100 TABLET, FILM COATED ORAL at 08:09

## 2022-09-06 RX ADMIN — INSULIN ASPART 3 UNITS: 100 INJECTION, SOLUTION INTRAVENOUS; SUBCUTANEOUS at 05:09

## 2022-09-06 RX ADMIN — CETIRIZINE HYDROCHLORIDE 10 MG: 5 TABLET, FILM COATED ORAL at 08:09

## 2022-09-06 RX ADMIN — DIVALPROEX SODIUM 500 MG: 250 TABLET, DELAYED RELEASE ORAL at 08:09

## 2022-09-06 RX ADMIN — DOCUSATE SODIUM 100 MG: 100 CAPSULE ORAL at 08:09

## 2022-09-06 RX ADMIN — INSULIN DETEMIR 14 UNITS: 100 INJECTION, SOLUTION SUBCUTANEOUS at 08:09

## 2022-09-06 RX ADMIN — INSULIN ASPART 10 UNITS: 100 INJECTION, SOLUTION INTRAVENOUS; SUBCUTANEOUS at 08:09

## 2022-09-06 RX ADMIN — OXCARBAZEPINE 600 MG: 300 TABLET, FILM COATED ORAL at 08:09

## 2022-09-06 RX ADMIN — DOCUSATE SODIUM 100 MG: 100 CAPSULE ORAL at 12:09

## 2022-09-06 NOTE — SUBJECTIVE & OBJECTIVE
Past Medical History:   Diagnosis Date    Hyperinsulinism     Seizures        No past surgical history on file.    Review of patient's allergies indicates:  No Known Allergies    No current facility-administered medications on file prior to encounter.     Current Outpatient Medications on File Prior to Encounter   Medication Sig    cetirizine (ZYRTEC) 10 MG tablet Take 10 mg by mouth once daily.    divalproex (DEPAKOTE) 500 MG TbEC Take 1 tablet (500 mg total) by mouth every 12 (twelve) hours.    lacosamide (VIMPAT) 150 mg Tab tablet Take 2 tablets (300 mg total) by mouth 2 (two) times daily.    lansoprazole (PREVACID) 15 MG capsule Take 15 mg by mouth once daily.    levothyroxine (SYNTHROID) 75 MCG tablet Take 1 tablet (75 mcg total) by mouth before breakfast.    OXcarbazepine (TRILEPTAL) 600 MG Tab Take 1 tablet (600 mg total) by mouth 2 (two) times daily.    blood sugar diagnostic Strp To use to check blood sugar 4 times daily. Contour next meter    blood-glucose meter,continuous (DEXCOM G6 ) Misc 1 each by Misc.(Non-Drug; Combo Route) route continuous prn.    blood-glucose sensor (DEXCOM G6 SENSOR) Elis 3 each by Misc.(Non-Drug; Combo Route) route continuous prn. Change every 10 days.    blood-glucose transmitter (DEXCOM G6 TRANSMITTER) Elis 1 each by Misc.(Non-Drug; Combo Route) route continuous prn.    CALCIUM PHOSPHATE ORAL Take by mouth.    levocetirizine (XYZAL) 5 MG tablet Take 5 mg by mouth.    multivitamin (THERAGRAN) per tablet daily.    omeprazole (PRILOSEC) 20 MG capsule Take 20 mg by mouth once daily.    ondansetron (ZOFRAN-ODT) 8 MG TbDL DISSOLVE ONE TABLET BY MOUTH EVERY 8 HOURS AS NEEDED FOR NAUSEA AND VOMITING     Continuous Infusions:    Family History    None       Tobacco Use    Smoking status: Never    Smokeless tobacco: Never   Substance and Sexual Activity    Alcohol use: Never    Drug use: Never    Sexual activity: Never     Partners: Male     Review of Systems   Constitutional:   Negative for chills and fever.   HENT:  Negative for sore throat.    Eyes:  Negative for pain and visual disturbance.   Respiratory:  Negative for chest tightness and shortness of breath.    Cardiovascular:  Negative for chest pain and palpitations.   Gastrointestinal:  Negative for abdominal pain, constipation, nausea and vomiting.   Genitourinary:  Negative for difficulty urinating and dysuria.   Musculoskeletal:  Negative for back pain.   Skin:  Negative for rash.   Neurological:  Negative for light-headedness and headaches.   Objective:     Vital Signs (Most Recent):  Temp: 98.6 °F (37 °C) (09/06/22 1242)  Pulse: 70 (09/06/22 1242)  Resp: 16 (09/06/22 1242)  BP: 115/75 (09/06/22 1242)  SpO2: 96 % (09/06/22 1242) Vital Signs (24h Range):  Temp:  [98.5 °F (36.9 °C)-98.6 °F (37 °C)] 98.6 °F (37 °C)  Pulse:  [70-78] 70  Resp:  [16-18] 16  SpO2:  [96 %-98 %] 96 %  BP: (111-116)/(67-76) 115/75        There is no height or weight on file to calculate BMI.    Physical Exam  Vitals and nursing note reviewed.   Constitutional:       General: She is not in acute distress.  HENT:      Head: Normocephalic.      Mouth/Throat:      Mouth: Mucous membranes are moist.   Cardiovascular:      Rate and Rhythm: Normal rate and regular rhythm.      Pulses: Normal pulses.      Heart sounds: Normal heart sounds.   Pulmonary:      Effort: No respiratory distress.      Breath sounds: Normal breath sounds.   Abdominal:      General: Bowel sounds are normal.      Palpations: Abdomen is soft.      Tenderness: There is no guarding.   Musculoskeletal:      Cervical back: No rigidity.      Right lower leg: No edema.      Left lower leg: No edema.   Skin:     Coloration: Skin is not jaundiced.     Neurological Exam:  MENTAL STATUS  Level of consciousness: alert  Orientation: oriented to person, place, and time  Attention normal. Concentration normal.  Speech: normal    CRANIAL NERVES  CN II: Visual fields full to confrontation  CN III, IV, VI:  PERRL, EOMI  CN V: Facial sensation intact  CN VII: Facial expression symmetric and full  CN VIII: Hearing intact to finger rub  CN IX, X: Symmetric palate elevation. Phonation normal  CN XI: Shoulder shrug and head turn intact bilaterally  CN XII: Tongue midline with normal movements, no atrophy    MOTOR EXAM  Muscle bulk: normal  Muscle tone: normal  Pronator drift: absent    Strength - Upper Extremities   Arm abduction Elbow flexion Elbow extension Wrist flexion Wrist extension Finger abduction   Right 5/5 5/5 5/5 5/5 5/5 5/5   Left 5/5 5/5 5/5 5/5 5/5 5/5     Strength - Lower Extremities   Hip flexion Knee flexion Knee extension Dorsiflexion Plantarflexion   Right 5/5 5/5 5/5 5/5 5/5   Left 5/5 5/5 5/5 5/5 5/5       REFLEXES   Biceps Triceps Brachioradialis Patellar Achilles   Right +2 +2 +2 +2 +2   Left +2 +2 +2 +2 +2     Toes down bilaterally    SENSORY EXAM  Light touch: intact in all 4 extremities  Temperature: intact in all 4 extremities  Pinprick: intact in all 4 extremities    COORDINATION  Finger to nose: normal          Significant Labs: CBC:   Recent Labs   Lab 09/06/22  1200   WBC 7.57   HGB 14.0   HCT 39.3   *     CMP: No results for input(s): GLU, NA, K, CL, CO2, BUN, CREATININE, CALCIUM, MG, PROT, ALBUMIN, BILITOT, ALKPHOS, AST, ALT, ANIONGAP, EGFRNONAA in the last 48 hours.    Significant Studies: I have reviewed all pertinent imaging results/findings within the past 24 hours.

## 2022-09-06 NOTE — ASSESSMENT & PLAN NOTE
Davis Duenas is a pleasant 22 year old woman admitted to the EMU for localization and management. 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 She has had seizures beginning 10 days of age, and started on AED medications at 18 months. Was seizure free since 09/2021, but had recent episode 8/2022. She's currently on lacosamide 300mg BID and oxcarbazepine 600mg BID for seizures, and divalproex 500mg BID for migraines    - reduce lacosamide to 100mg BID  - continue oxcarbazepine at 600mg BID for seizures and divalproex at 500mg BID for migraines  - check above AEDs  - continuous vEEG   - PRN IV ativan for GTC >5 minutes, notify epilepsy on call before administering  - seizure precautions, suction and oxygen at bedside  - fall precautions, side rail padding in place  - Visi monitoring with continuous pulse oximetry   - telemetry      Patient and family counseled on seizure precautions  until six months seizure free including no driving or operating heavy machinery, no submerging in water, take showers instead of baths whenever possible, do not care for children alone, caution when using hot objects especially cooking and do not scale ladders or heights unsupported or unaccompanied.

## 2022-09-06 NOTE — ASSESSMENT & PLAN NOTE
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, now transitioned to DM. On detemir 17 U BID, and humalog 12 U + sliding scale    - Detemir 15 U BID  - Aspart 10 U TIDWM  - LDSSI 1-5 U

## 2022-09-06 NOTE — HPI
"Davis Duenas is a pleasant 22 year old woman admitted to the EMU for localization and management. 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    Seizure description:  Aura: includes migraine-type headaches  Events: Stares off, R sided facial twitching  beginning with her mouth extending to the eye, followed by R arm rhythmic flexion at the the wrist. Family aborts episodes with Midazolam nasally (5mg in each nostril, 10mg total). If it's not stopped, seizure extends bilaterally. Episodes usually last a few minutes. Previously, when she was younger (~ 2-3 yo), episodes would last "hours", with history of status epilepticus.  Post-ictal: sleepiness and increase in appetite  Triggers: Occurs more with stress, excitement and lack of sleep, last event Aug 7, 2022  Total: Mom estimates patient has had > 200 total lifetime events    Current AEDs:  - lacosamide 300mg BID, oxcarbazepine 600mg BID for seizures  - divalproex 500mg BID for migraines  Has not noticed side effects from current regimen. 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 No typical events were recorded. There is no electrographic evidence of status epilepticus. Spikes and sharp waves, Midline, Maximum at Pz. Continuous Slowing, Generalized, Theta-Delta Activities"  - EEG; last in 2021, 27 minutes, was normal  - MRI 2021: no significant findings      HPI per chart:  "The patient started having seizure in infancy at 10 days old. 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 It was initially Ithought that hyperisulinism was the cause of the " "seizures. Underwent pancreatectomy at age of  6 months old at [facility] thinking that would improve her condition but it did not.  The patient was started on AED medications at the age of  18 months after being diagnosed with epilepsy. The patient would exhibit multiple types of seizure like episodes she would stare off into on direction and her  mouth eyes would twitch on one side and she would began to jerk and shake. Seizures start as staring followed by facial twitching and them grand mal seizures. Initially, she was prone to having repetitive and prolonged seizures. At baseline, she would have 3 seizures every 2 months on average.Routine EEGs have been normal except at age 15 when EEG was read abnormal. The patient also has mild intellectual disability but highly functioning. Prior AEDs: PB, DZP,  TPM, LEV, PHT,  LEV and   ZNS. Current AEDs:  mg BID,  mg po BID and VPA  mg BID. Adding VPA helped tremendously. Has not had a seizure since 09/2021 until 8/7/2022 Adding VPA also helped eradicate her frequent severe headaches that improved with sleep and OTC NSAIDs."  "

## 2022-09-06 NOTE — PLAN OF CARE
Problem: Seizure, Active Management  Goal: Absence of Seizure/Seizure-Related Injury  Outcome: Ongoing, Progressing     Problem: Diabetes Comorbidity  Goal: Blood Glucose Level Within Targeted Range  Outcome: Ongoing, Progressing     POC reviewed with the patient and they verbalized understanding. All comments and concerns addressed. Bed locked in lowest position with bed alarm set, call light within reach. Safety precautions maintained. VSS, see flowsheets. Will continue to monitor for changes to POC and clinical condition.

## 2022-09-06 NOTE — NURSING
EMU NURSING INTERVIEW    Pt admitted to EMU room -905-  -,   EMU team notified.    Onset-When did your seizures start?she is 10 days old,never controlled every couple month on average .    Aura-Do you experience an aura? Headache sometimes,sometimes not.    Symptoms-What symptoms do you experience?stares drooling, face twitch,eyes chick, hand jerking,arms moving.    Triggers-Do you have any Triggers? Stress,lack of sleep, over excitement .    Do you bite your tongue?yes.     Do become incontinent of bladder or bowels?No     Have you been told your BP or oxygen drops during an event? Do not know.    Bed locked, bed alarm on and call light in reach. Educated to call staff before ambulating. Educated to use event button when patient feels like they will have an event or when an event is suspected. Pt and family verbalize understanding.

## 2022-09-06 NOTE — H&P
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    Seizure description:  Aura: includes migraine-type headaches  Events: Stares off, R sided facial twitching  beginning with her mouth extending to the eye, followed by R arm rhythmic flexion at the the wrist. Family aborts episodes with Midazolam nasally (5mg in each nostril, 10mg total). If it's not stopped, seizure extends bilaterally. Episodes usually last a few minutes. Previously, when she was younger (~ 2-3 yo), episodes would last "hours", with history of status epilepticus.  Post-ictal: sleepiness and increase in appetite  Triggers: Occurs more with stress, excitement and lack of sleep, last event Aug 7, 2022  Total: Mom estimates patient has had > 200 total lifetime events    Current AEDs:  - lacosamide 300mg BID, oxcarbazepine 600mg BID for seizures  - divalproex 500mg BID for migraines  Has not noticed side effects from current regimen. 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 No typical events were recorded. There is no electrographic evidence of status epilepticus. Spikes and sharp waves, Midline, Maximum at Pz. " "Continuous Slowing, Generalized, Theta-Delta Activities"  - EEG; last in 2021, 27 minutes, was normal  - MRI 2021: no significant findings      HPI per chart:  "The patient started having seizure in infancy at 10 days old. 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 It was initially Ithought that hyperisulinism was the cause of the seizures. Underwent pancreatectomy at age of  6 months old at [facility] thinking that would improve her condition but it did not.  The patient was started on AED medications at the age of  18 months after being diagnosed with epilepsy. The patient would exhibit multiple types of seizure like episodes she would stare off into on direction and her  mouth eyes would twitch on one side and she would began to jerk and shake. Seizures start as staring followed by facial twitching and them grand mal seizures. Initially, she was prone to having repetitive and prolonged seizures. At baseline, she would have 3 seizures every 2 months on average.Routine EEGs have been normal except at age 15 when EEG was read abnormal. The patient also has mild intellectual disability but highly functioning. Prior AEDs: PB, DZP,  TPM, LEV, PHT,  LEV and   ZNS. Current AEDs:  mg BID,  mg po BID and VPA  mg BID. Adding VPA helped tremendously. Has not had a seizure since 2021 until 2022 Adding VPA also helped eradicate her frequent severe headaches that improved with sleep and OTC NSAIDs."      Past Medical History:   Diagnosis Date    Hyperinsulinism     Seizures        No past surgical history on file.    Review of patient's allergies indicates:  No Known Allergies    No current facility-administered medications on file prior to encounter.     Current Outpatient Medications on File Prior to Encounter   Medication Sig    cetirizine (ZYRTEC) 10 MG tablet Take 10 mg by mouth once daily.    divalproex (DEPAKOTE) 500 MG TbEC Take 1 " tablet (500 mg total) by mouth every 12 (twelve) hours.    lacosamide (VIMPAT) 150 mg Tab tablet Take 2 tablets (300 mg total) by mouth 2 (two) times daily.    lansoprazole (PREVACID) 15 MG capsule Take 15 mg by mouth once daily.    levothyroxine (SYNTHROID) 75 MCG tablet Take 1 tablet (75 mcg total) by mouth before breakfast.    OXcarbazepine (TRILEPTAL) 600 MG Tab Take 1 tablet (600 mg total) by mouth 2 (two) times daily.    blood sugar diagnostic Strp To use to check blood sugar 4 times daily. Contour next meter    blood-glucose meter,continuous (DEXCOM G6 ) Misc 1 each by Misc.(Non-Drug; Combo Route) route continuous prn.    blood-glucose sensor (DEXCOM G6 SENSOR) Elis 3 each by Misc.(Non-Drug; Combo Route) route continuous prn. Change every 10 days.    blood-glucose transmitter (DEXCOM G6 TRANSMITTER) Elis 1 each by Misc.(Non-Drug; Combo Route) route continuous prn.    CALCIUM PHOSPHATE ORAL Take by mouth.    levocetirizine (XYZAL) 5 MG tablet Take 5 mg by mouth.    multivitamin (THERAGRAN) per tablet daily.    omeprazole (PRILOSEC) 20 MG capsule Take 20 mg by mouth once daily.    ondansetron (ZOFRAN-ODT) 8 MG TbDL DISSOLVE ONE TABLET BY MOUTH EVERY 8 HOURS AS NEEDED FOR NAUSEA AND VOMITING     Continuous Infusions:    Family History    None       Tobacco Use    Smoking status: Never    Smokeless tobacco: Never   Substance and Sexual Activity    Alcohol use: Never    Drug use: Never    Sexual activity: Never     Partners: Male     Review of Systems   Constitutional:  Negative for chills and fever.   HENT:  Negative for sore throat.    Eyes:  Negative for pain and visual disturbance.   Respiratory:  Negative for chest tightness and shortness of breath.    Cardiovascular:  Negative for chest pain and palpitations.   Gastrointestinal:  Negative for abdominal pain, constipation, nausea and vomiting.   Genitourinary:  Negative for difficulty urinating and dysuria.   Musculoskeletal:   Negative for back pain.   Skin:  Negative for rash.   Neurological:  Negative for light-headedness and headaches.   Objective:     Vital Signs (Most Recent):  Temp: 98.6 °F (37 °C) (09/06/22 1242)  Pulse: 70 (09/06/22 1242)  Resp: 16 (09/06/22 1242)  BP: 115/75 (09/06/22 1242)  SpO2: 96 % (09/06/22 1242) Vital Signs (24h Range):  Temp:  [98.5 °F (36.9 °C)-98.6 °F (37 °C)] 98.6 °F (37 °C)  Pulse:  [70-78] 70  Resp:  [16-18] 16  SpO2:  [96 %-98 %] 96 %  BP: (111-116)/(67-76) 115/75        There is no height or weight on file to calculate BMI.    Physical Exam  Vitals and nursing note reviewed.   Constitutional:       General: She is not in acute distress.  HENT:      Head: Normocephalic.      Mouth/Throat:      Mouth: Mucous membranes are moist.   Cardiovascular:      Rate and Rhythm: Normal rate and regular rhythm.      Pulses: Normal pulses.      Heart sounds: Normal heart sounds.   Pulmonary:      Effort: No respiratory distress.      Breath sounds: Normal breath sounds.   Abdominal:      General: Bowel sounds are normal.      Palpations: Abdomen is soft.      Tenderness: There is no guarding.   Musculoskeletal:      Cervical back: No rigidity.      Right lower leg: No edema.      Left lower leg: No edema.   Skin:     Coloration: Skin is not jaundiced.     Neurological Exam:  MENTAL STATUS  Level of consciousness: alert  Orientation: oriented to person, place, and time  Attention normal. Concentration normal.  Speech: normal    CRANIAL NERVES  CN II: Visual fields full to confrontation  CN III, IV, VI: PERRL, EOMI  CN V: Facial sensation intact  CN VII: Facial expression symmetric and full  CN VIII: Hearing intact to finger rub  CN IX, X: Symmetric palate elevation. Phonation normal  CN XI: Shoulder shrug and head turn intact bilaterally  CN XII: Tongue midline with normal movements, no atrophy    MOTOR EXAM  Muscle bulk: normal  Muscle tone: normal  Pronator drift: absent    Strength - Upper Extremities   Arm  abduction Elbow flexion Elbow extension Wrist flexion Wrist extension Finger abduction   Right 5/5 5/5 5/5 5/5 5/5 5/5   Left 5/5 5/5 5/5 5/5 5/5 5/5     Strength - Lower Extremities   Hip flexion Knee flexion Knee extension Dorsiflexion Plantarflexion   Right 5/5 5/5 5/5 5/5 5/5   Left 5/5 5/5 5/5 5/5 5/5       REFLEXES   Biceps Triceps Brachioradialis Patellar Achilles   Right +2 +2 +2 +2 +2   Left +2 +2 +2 +2 +2     Toes down bilaterally    SENSORY EXAM  Light touch: intact in all 4 extremities  Temperature: intact in all 4 extremities  Pinprick: intact in all 4 extremities    COORDINATION  Finger to nose: normal          Significant Labs: CBC:   Recent Labs   Lab 09/06/22  1200   WBC 7.57   HGB 14.0   HCT 39.3   *     CMP: No results for input(s): GLU, NA, K, CL, CO2, BUN, CREATININE, CALCIUM, MG, PROT, ALBUMIN, BILITOT, ALKPHOS, AST, ALT, ANIONGAP, EGFRNONAA in the last 48 hours.    Significant Studies: I have reviewed all pertinent imaging results/findings within the past 24 hours.    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 She has had seizures beginning 10 days of age, and started on AED medications at 18 months. Was seizure free since 09/2021, but had recent episode 8/2022. She's currently on lacosamide 300mg BID and oxcarbazepine 600mg BID for seizures, and divalproex 500mg BID for migraines    - reduce lacosamide to 100mg BID  - continue oxcarbazepine at 600mg BID for seizures and divalproex at 500mg BID for migraines  - check above AEDs  - continuous vEEG   - PRN IV ativan for GTC >5 minutes, notify epilepsy on call before administering  - seizure precautions, suction and oxygen at bedside  - fall precautions, side rail padding in place  - Visi monitoring with continuous pulse  oximetry   - telemetry      Patient and family counseled on seizure precautions  until six months seizure free including no driving or operating heavy machinery, no submerging in water, take showers instead of baths whenever possible, do not care for children alone, caution when using hot objects especially cooking and do not scale ladders or heights unsupported or unaccompanied.     Migraine without aura and without status migrainosus, not intractable  - on divalproex 500mg BID for migraines    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, now transitioned to DM. On detemir 17 U BID, and humalog 12 U + sliding scale    - Detemir 15 U BID  - Aspart 10 U TIDWM  - LDSSI 1-5 U    Hypothyroidism  Continue home levothyroxine 75mcg QD      VTE Risk Mitigation (From admission, onward)         Ordered     IP VTE HIGH RISK PATIENT  Once         22 1128     Place sequential compression device  Until discontinued         22 1128                Nate Guerrero MD  Neurology-Epilepsy  [facility] - Neurosurgery ([facility])

## 2022-09-07 LAB
POCT GLUCOSE: 176 MG/DL (ref 70–110)
POCT GLUCOSE: 181 MG/DL (ref 70–110)
POCT GLUCOSE: 223 MG/DL (ref 70–110)
POCT GLUCOSE: 229 MG/DL (ref 70–110)

## 2022-09-07 PROCEDURE — 95720 EEG PHY/QHP EA INCR W/VEEG: CPT | Mod: ,,, | Performed by: PSYCHIATRY & NEUROLOGY

## 2022-09-07 PROCEDURE — 25000003 PHARM REV CODE 250: Performed by: PHYSICIAN ASSISTANT

## 2022-09-07 PROCEDURE — 11000001 HC ACUTE MED/SURG PRIVATE ROOM

## 2022-09-07 PROCEDURE — 95714 VEEG EA 12-26 HR UNMNTR: CPT

## 2022-09-07 PROCEDURE — 95720 PR EEG, W/VIDEO, CONT RECORD, I&R, >12<26 HRS: ICD-10-PCS | Mod: ,,, | Performed by: PSYCHIATRY & NEUROLOGY

## 2022-09-07 PROCEDURE — 99233 PR SUBSEQUENT HOSPITAL CARE,LEVL III: ICD-10-PCS | Mod: ,,, | Performed by: PSYCHIATRY & NEUROLOGY

## 2022-09-07 PROCEDURE — 25000003 PHARM REV CODE 250

## 2022-09-07 PROCEDURE — 96372 THER/PROPH/DIAG INJ SC/IM: CPT

## 2022-09-07 PROCEDURE — 99233 SBSQ HOSP IP/OBS HIGH 50: CPT | Mod: ,,, | Performed by: PSYCHIATRY & NEUROLOGY

## 2022-09-07 RX ORDER — TRAMADOL HYDROCHLORIDE 50 MG/1
50 TABLET ORAL ONCE
Status: COMPLETED | OUTPATIENT
Start: 2022-09-08 | End: 2022-09-08

## 2022-09-07 RX ORDER — OXCARBAZEPINE 150 MG/1
150 TABLET, FILM COATED ORAL 2 TIMES DAILY
Status: DISCONTINUED | OUTPATIENT
Start: 2022-09-07 | End: 2022-09-09

## 2022-09-07 RX ORDER — INSULIN ASPART 100 [IU]/ML
12 INJECTION, SOLUTION INTRAVENOUS; SUBCUTANEOUS
Status: DISCONTINUED | OUTPATIENT
Start: 2022-09-07 | End: 2022-09-08

## 2022-09-07 RX ORDER — DIPHENHYDRAMINE HCL 50 MG
50 CAPSULE ORAL ONCE
Status: COMPLETED | OUTPATIENT
Start: 2022-09-08 | End: 2022-09-08

## 2022-09-07 RX ORDER — DOCUSATE SODIUM 100 MG/1
100 CAPSULE, LIQUID FILLED ORAL 2 TIMES DAILY PRN
Status: DISCONTINUED | OUTPATIENT
Start: 2022-09-07 | End: 2022-09-13 | Stop reason: HOSPADM

## 2022-09-07 RX ORDER — ALUMINUM HYDROXIDE, MAGNESIUM HYDROXIDE, AND SIMETHICONE 2400; 240; 2400 MG/30ML; MG/30ML; MG/30ML
30 SUSPENSION ORAL EVERY 6 HOURS PRN
Status: DISCONTINUED | OUTPATIENT
Start: 2022-09-07 | End: 2022-09-13 | Stop reason: HOSPADM

## 2022-09-07 RX ADMIN — INSULIN ASPART 10 UNITS: 100 INJECTION, SOLUTION INTRAVENOUS; SUBCUTANEOUS at 09:09

## 2022-09-07 RX ADMIN — INSULIN ASPART 12 UNITS: 100 INJECTION, SOLUTION INTRAVENOUS; SUBCUTANEOUS at 05:09

## 2022-09-07 RX ADMIN — LEVOTHYROXINE SODIUM 75 MCG: 25 TABLET ORAL at 05:09

## 2022-09-07 RX ADMIN — LACOSAMIDE 100 MG: 100 TABLET, FILM COATED ORAL at 08:09

## 2022-09-07 RX ADMIN — LACOSAMIDE 100 MG: 100 TABLET, FILM COATED ORAL at 09:09

## 2022-09-07 RX ADMIN — OXCARBAZEPINE 600 MG: 300 TABLET, FILM COATED ORAL at 09:09

## 2022-09-07 RX ADMIN — INSULIN ASPART 12 UNITS: 100 INJECTION, SOLUTION INTRAVENOUS; SUBCUTANEOUS at 12:09

## 2022-09-07 RX ADMIN — DIVALPROEX SODIUM 500 MG: 250 TABLET, DELAYED RELEASE ORAL at 09:09

## 2022-09-07 RX ADMIN — CETIRIZINE HYDROCHLORIDE 10 MG: 5 TABLET, FILM COATED ORAL at 08:09

## 2022-09-07 RX ADMIN — INSULIN DETEMIR 14 UNITS: 100 INJECTION, SOLUTION SUBCUTANEOUS at 09:09

## 2022-09-07 RX ADMIN — OXCARBAZEPINE 150 MG: 150 TABLET, FILM COATED ORAL at 08:09

## 2022-09-07 RX ADMIN — ALUMINUM HYDROXIDE, MAGNESIUM HYDROXIDE, AND DIMETHICONE 30 ML: 400; 400; 40 SUSPENSION ORAL at 01:09

## 2022-09-07 NOTE — PLAN OF CARE
Problem: Adult Inpatient Plan of Care  Goal: Plan of Care Review  Outcome: Ongoing, Progressing  Flowsheets (Taken 9/7/2022 0043)  Plan of Care Reviewed With:   patient   mother     Problem: Diabetes Comorbidity  Goal: Blood Glucose Level Within Targeted Range  Outcome: Ongoing, Progressing  Intervention: Monitor and Manage Glycemia  Flowsheets (Taken 9/7/2022 0043)  Glycemic Management:   blood glucose monitored   supplemental insulin given     Problem: Seizure, Active Management  Goal: Absence of Seizure/Seizure-Related Injury  Outcome: Ongoing, Progressing  Intervention: Prevent Seizure-Related Injury  Flowsheets (Taken 9/7/2022 0043)  Seizure Precautions:   activity supervised   clutter-free environment maintained   emergency equipment at bedside   side rails padded     POC reviewed with patient and mother, understanding verbalized. Pt denies any pain and VSS. Continuous EEG and Tele monitoring ongoing. No acute events or seizure-like activity overnight. BG checked and insulin administered per order. Fall and seizure precautions maintained; bed locked and in lowest position. Side rails up, locked and padded x4. Call light and and personal belongings within reach. See flow sheet for full assessment and VS info; mother remains at bedside. Will continue to monitor.

## 2022-09-07 NOTE — PROCEDURES
EMU Report    DATE OF PROCEDURE:  9/6/22     EEG NUMBER:  [facility] -1     REFERRING PHYSICIAN: :Genie Villanueva NP      This EEG was performed to characterize the patient's events.     ELECTROENCEPHALOGRAM REPORT     METHODOLOGY:  Electroencephalographic (EEG) recording is recorded with electrodes placed according to the International 10-20 placement system.  Thirty two (32) channels of digital signal (sampling rate of 512/sec), including T1 and T2, were simultaneously recorded from the scalp and may include EKG, EMG, and/or eye monitors.  Recording band pass was 0.1 to 512 Hz.  Digital video recording of the patient is simultaneously recorded with the EEG.  The patient is instructed to report clinical symptoms which may occur during the recording session.  EEG and video recording are stored and archived in digital format.    Activation procedures, which include photic stimulation, hyperventilation and instructing patients to perform simple tasks, are done in selected patients.   The EEG is displayed on a monitor screen and can be reviewed using different montages.  Computer assisted-analysis is employed to detect spike and electrographic seizure activity.   The entire record is submitted for computer analysis.  The entire recording is visually reviewed, and the times identified by computer analysis as being spikes or seizures are reviewed again.    Compressed spectral analysis (CSA) is also performed on the activity recorded from each individual channel.  This is displayed as a power display of frequencies from 0 to 30 Hz over time.   The CSA is reviewed looking for asymmetries in power between homologous areas of the scalp, then compared with the original EEG recording.    [employer] software was also utilized in the review of this study.  This software suite analyzes the EEG recording in multiple domains.  Coherence and rhythmicity are computed to identify EEG sections which may contain organized seizures.  Each  channel undergoes analysis to detect the presence of spike and sharp waves which have special and morphological characteristics of epileptic activity.  The routine EEG recording is converted from special into frequency domain.  This is then displayed comparing homologous areas to identify areas of significant asymmetry.  Algorithm to identify non-cortically generated artifact is used to separate artifact from the EEG.       RECORDING TIMES:   Start on September 6, 2022 at hour 40 minute 21 seconds 16   End on September 7, 2022 at hours 7 minute 51 seconds 16   The total time of EEG recording for the study was 16 hours and 35 minutes    SEIZURES RECORDED:  During this recording no events were recorded     ELECTROENCEPHALOGRAM:  Interictal:  The recording was obtained with a number of standard bipolar and referential montages during wakefulness, drowsiness and sleep.  In the alert state, the posterior background rhythm was a symmetric, well-modulated 10 Hz alpha rhythm, which reacted symmetrically to eye opening.  Activation procedures were not performed.  Mixed generalized semi synchronous delta range slowing was noted during wakefulness.  During drowsiness, the background rhythm waxed and waned and there were periods of slowing. During stage II sleep, symmetric V waves, K complexes and sleep spindles were noted.  During sleep low-amplitude infrequent epileptiform discharges were noted maximally at T3-T5/C3-P3.     Ictal:  No events recorded      EKG: The EKG channeled revealed normal sinus rhythm     FINAL SUMMARY:  ELECTROENCEPHALOGRAM:  Interictal:  This is an abnormal EEG during wakefulness, drowsiness and sleep.  Mild slowing of the background was noted.  During sleep low-amplitude infrequent epileptiform discharges were noted maximally at T3-T5/C3-P3.       Ictal:  During this recording no events were recorded      CLINICAL SEIZURE:  Classification:  Focal epilepsy, likely in the left hemisphere     CLINICAL  CORRELATION:  The patient is a 22-year-old female with a history of seizures, who is maintained on valproic acid, oxcarbazepine and lacosamide.. This is an abnormal EEG during wakefulness, drowsiness and sleep.  The presence of generalized slowing during wakefulness suggestive mild encephalopathy nonspecific to the cause.  The presence of left temporoparietal maximum sharp waves confers increased risk for focal seizures from this region.  This study no seizures recorded.

## 2022-09-07 NOTE — ASSESSMENT & PLAN NOTE
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 Events often preceded by headache and described as 1) staring episodes associated with R facial twitching followed by RUE rhythmic flexion 2) progresses to GTC. Events triggered by stress and sleep deprivation.    - Continuous vEEG   - Decreased Lacosamide to 100 mg BID on admit, Oxcarbazepine decreased to 150 mg BID on 9/7 PM, held VPA on 9/7  - AED levels pending  - No typical events  - EEG with mild encephalopathy, left temporoparietal maximum sharp waves, no electrographic seizures  - Activation procedures per protocol- medication adjustments as above, HV/PS, sleep deprivation tonight, benadryl/tramadol 9/8 AM  - IV Ativan PRN for GTC greater than 5 min - please call epilepsy on call before administering  - Seizure precautions, suction and oxygen at bedside  - Fall precautions, side rail padding in place  - Visi monitoring with continuous pulse oximetry   - Telemetry

## 2022-09-07 NOTE — PROGRESS NOTES
"Nutrition-Related Diabetes Education      Time Spent: "CHO Counting for People with Diabetes"    Learners: Patient and Mother     Current HbA1c: 9.7%    Is patient aware of their A1c and their goal A1c?       ___x____ yes      Nutrition Education with handouts: "CHO Counting for People with Diabetes"- did not want RD to go over information provided within handout at this time. Please message RD if any question arise regarding information provided.    Comments: RD visit d/t being flagged for A1C of 9.7%. Spoke with pt and mom at bedside. States intake % with 2-3 meals/day on general diet. States intake now % with good appetite. No issues with n/v/d/c/chewing/swallowing. Unsure UBW and no consistent wt hx. NFPE not warranted d/t appearing well-nourished. Education handout provided. LBM 9/5      Follow up: Yes     Please consult as needed.  Thank you!     Liudmila FELTONN  "

## 2022-09-07 NOTE — SUBJECTIVE & OBJECTIVE
Interval History: NAEON. This AM, patient resting comfortably in bed watching TV with mother at bedside. No typical events since admit. Patient has no complaints. Discussed plan of care with patient and mother including AED adjustments, HV/PS today, sleep deprivation tonight, and benadryl/tramadol tomorrow AM. Answered questions at bedside.    Current Facility-Administered Medications   Medication Dose Route Frequency Provider Last Rate Last Admin    acetaminophen tablet 650 mg  650 mg Oral Q4H PRN Nate Guerrero MD        aluminum & magnesium hydroxide-simethicone 400-400-40 mg/5 mL suspension 30 mL  30 mL Oral Q6H PRN Nate Guerrero MD        cetirizine tablet 10 mg  10 mg Oral QHS Nate Guerrero MD   10 mg at 09/06/22 2024    dextrose 10% bolus 125 mL  12.5 g Intravenous PRN Francis Mcknight MD        dextrose 10% bolus 250 mL  25 g Intravenous PRN Francis Mcknight MD        [START ON 9/8/2022] diphenhydrAMINE capsule 50 mg  50 mg Oral Once Tracee Parker PA-C        docusate sodium capsule 100 mg  100 mg Oral BID PRN Tracee Parker PA-C        glucagon (human recombinant) injection 1 mg  1 mg Intramuscular PRN Nate Guerrero MD        glucose chewable tablet 16 g  16 g Oral PRN Nate Guerrero MD        glucose chewable tablet 24 g  24 g Oral PRN Nate Guerrero MD        insulin aspart U-100 pen 0-5 Units  0-5 Units Subcutaneous QID (AC + HS) PRN Nate Guerrero MD   3 Units at 09/06/22 1733    insulin aspart U-100 pen 12 Units  12 Units Subcutaneous TIDWM Tracee Parker PA-C   12 Units at 09/07/22 1242    insulin detemir U-100 pen 15 Units  15 Units Subcutaneous BID Tracee Parker PA-C        lacosamide tablet 100 mg  100 mg Oral BID Nate Guerrero MD   100 mg at 09/07/22 0906    levothyroxine tablet 75 mcg  75 mcg Oral Before breakfast Nate Guerrero MD   75 mcg at 09/07/22 0548    lorazepam injection 2 mg  2 mg Intravenous Q5 Min PRN Nate Guerreor MD        OXcarbazepine tablet 150 mg  150 mg  Oral BID Tracee Parker PA-C        sodium chloride 0.9% flush 10 mL  10 mL Intravenous PRN Nate Guerrero MD        [START ON 9/8/2022] traMADoL tablet 50 mg  50 mg Oral Once Tracee Parker PA-C         Continuous Infusions:    Review of Systems   Neurological:  Positive for seizures (none since admit).   All other systems reviewed and are negative.  Objective:     Vital Signs (Most Recent):  Temp: 98.5 °F (36.9 °C) (09/07/22 1100)  Pulse: 72 (09/07/22 1128)  Resp: 18 (09/07/22 1100)  BP: 103/66 (09/07/22 1100)  SpO2: 97 % (09/07/22 1100) Vital Signs (24h Range):  Temp:  [97.6 °F (36.4 °C)-98.7 °F (37.1 °C)] 98.5 °F (36.9 °C)  Pulse:  [64-84] 72  Resp:  [13-18] 18  SpO2:  [95 %-98 %] 97 %  BP: ()/(60-88) 103/66     Weight: 78.3 kg (172 lb 9.9 oz)  Body mass index is 33.71 kg/m².    Physical Exam  Vitals reviewed.   Constitutional:       General: She is not in acute distress.     Appearance: She is not diaphoretic.   HENT:      Head: Normocephalic and atraumatic.      Comments: EEG in place  Eyes:      General: No scleral icterus.        Right eye: No discharge.         Left eye: No discharge.      Extraocular Movements: Extraocular movements intact and EOM normal.      Conjunctiva/sclera: Conjunctivae normal.      Pupils: Pupils are equal, round, and reactive to light.   Cardiovascular:      Rate and Rhythm: Normal rate.   Pulmonary:      Effort: Pulmonary effort is normal. No respiratory distress.   Abdominal:      General: There is no distension.      Palpations: Abdomen is soft.      Tenderness: There is no abdominal tenderness.   Musculoskeletal:         General: No swelling, tenderness or deformity. Normal range of motion.   Skin:     General: Skin is warm and dry.   Neurological:      General: No focal deficit present.      Mental Status: She is alert. Mental status is at baseline.   Psychiatric:         Behavior: Behavior is cooperative.       NEUROLOGICAL EXAMINATION:     MENTAL STATUS   Level of  consciousness: alert    CRANIAL NERVES     CN II   Visual fields full to confrontation.     CN III, IV, VI   Pupils are equal, round, and reactive to light.  Extraocular motions are normal.     CN V   Right corneal reflex: normal  Left corneal reflex: normal    CN VII   Facial expression full, symmetric.     CN VIII   Hearing: intact    CN XI   CN XI normal.     CN XII   CN XII normal.     Significant Labs: All pertinent lab results from the past 24 hours have been reviewed.    Significant Studies: I have reviewed all pertinent imaging results/findings within the past 24 hours.

## 2022-09-07 NOTE — PROGRESS NOTES
[facility] - EMU ([facility])  Neurology-Epilepsy  Progress Note    Patient Name: Davis Duenas  MRN: [id number]  Admission Date: 9/6/2022  Hospital Length of Stay: 0 days  Code Status: Full Code   Attending Provider: Francis Mcknight MD  Primary Care Physician: Liborio Patel Jr, MD   Principal Problem:Epilepsy with both generalized and focal features    Subjective:     Hospital Course:   9/6>9/7: Admit to EMU. Lacosamide decreased to 100 mg BID on admit; continued home Oxcarbazepine 600 mg BID and VPA  mg BID (Rx for headache management). No typical events. EEG with mild encephalopathy, left temporoparietal maximum sharp waves, no electrographic seizures. Stop VPA and decrease Oxcarbazepine to 150 mg BID. Proceed with provoking measures for event capture- HV/PS today, sleep deprivation tonight, benadryl/tramadol 9/8 AM.      Interval History: NAEON. This AM, patient resting comfortably in bed watching TV with mother at bedside. No typical events since admit. Patient has no complaints. Discussed plan of care with patient and mother including AED adjustments, HV/PS today, sleep deprivation tonight, and benadryl/tramadol tomorrow AM. Answered questions at bedside.    Current Facility-Administered Medications   Medication Dose Route Frequency Provider Last Rate Last Admin    acetaminophen tablet 650 mg  650 mg Oral Q4H PRN Nate Guerrero MD        aluminum & magnesium hydroxide-simethicone 400-400-40 mg/5 mL suspension 30 mL  30 mL Oral Q6H PRN Nate Guerrero MD        cetirizine tablet 10 mg  10 mg Oral QHS Nate Guerrero MD   10 mg at 09/06/22 2024    dextrose 10% bolus 125 mL  12.5 g Intravenous PRN Francis Mcknight MD        dextrose 10% bolus 250 mL  25 g Intravenous PRN Francis Mcknight MD        [START ON 9/8/2022] diphenhydrAMINE capsule 50 mg  50 mg Oral Once Tracee Parker PA-C        docusate sodium capsule 100 mg  100 mg Oral BID PRN Tracee Parker PA-C        glucagon (human recombinant) injection 1  mg  1 mg Intramuscular PRN Nate Guerrero MD        glucose chewable tablet 16 g  16 g Oral PRN Nate Guerrero MD        glucose chewable tablet 24 g  24 g Oral PRN Nate Guerrero MD        insulin aspart U-100 pen 0-5 Units  0-5 Units Subcutaneous QID (AC + HS) PRN Nate Guerrero MD   3 Units at 09/06/22 1733    insulin aspart U-100 pen 12 Units  12 Units Subcutaneous TIDWM Tracee Parker PA-C   12 Units at 09/07/22 1242    insulin detemir U-100 pen 15 Units  15 Units Subcutaneous BID Tracee Parker PA-C        lacosamide tablet 100 mg  100 mg Oral BID Nate Guerrero MD   100 mg at 09/07/22 0906    levothyroxine tablet 75 mcg  75 mcg Oral Before breakfast Nate Guerrero MD   75 mcg at 09/07/22 0548    lorazepam injection 2 mg  2 mg Intravenous Q5 Min PRN Nate Guerrero MD        OXcarbazepine tablet 150 mg  150 mg Oral BID Tracee Parker PA-C        sodium chloride 0.9% flush 10 mL  10 mL Intravenous PRN Nate Guerrero MD        [START ON 9/8/2022] traMADoL tablet 50 mg  50 mg Oral Once Tracee Parker PA-C         Continuous Infusions:    Review of Systems   Neurological:  Positive for seizures (none since admit).   All other systems reviewed and are negative.  Objective:     Vital Signs (Most Recent):  Temp: 98.5 °F (36.9 °C) (09/07/22 1100)  Pulse: 72 (09/07/22 1128)  Resp: 18 (09/07/22 1100)  BP: 103/66 (09/07/22 1100)  SpO2: 97 % (09/07/22 1100) Vital Signs (24h Range):  Temp:  [97.6 °F (36.4 °C)-98.7 °F (37.1 °C)] 98.5 °F (36.9 °C)  Pulse:  [64-84] 72  Resp:  [13-18] 18  SpO2:  [95 %-98 %] 97 %  BP: ()/(60-88) 103/66     Weight: 78.3 kg (172 lb 9.9 oz)  Body mass index is 33.71 kg/m².    Physical Exam  Vitals reviewed.   Constitutional:       General: She is not in acute distress.     Appearance: She is not diaphoretic.   HENT:      Head: Normocephalic and atraumatic.      Comments: EEG in place  Eyes:      General: No scleral icterus.        Right eye: No discharge.          Left eye: No discharge.      Extraocular Movements: Extraocular movements intact and EOM normal.      Conjunctiva/sclera: Conjunctivae normal.      Pupils: Pupils are equal, round, and reactive to light.   Cardiovascular:      Rate and Rhythm: Normal rate.   Pulmonary:      Effort: Pulmonary effort is normal. No respiratory distress.   Abdominal:      General: There is no distension.      Palpations: Abdomen is soft.      Tenderness: There is no abdominal tenderness.   Musculoskeletal:         General: No swelling, tenderness or deformity. Normal range of motion.   Skin:     General: Skin is warm and dry.   Neurological:      General: No focal deficit present.      Mental Status: She is alert. Mental status is at baseline.   Psychiatric:         Behavior: Behavior is cooperative.       NEUROLOGICAL EXAMINATION:     MENTAL STATUS   Level of consciousness: alert    CRANIAL NERVES     CN II   Visual fields full to confrontation.     CN III, IV, VI   Pupils are equal, round, and reactive to light.  Extraocular motions are normal.     CN V   Right corneal reflex: normal  Left corneal reflex: normal    CN VII   Facial expression full, symmetric.     CN VIII   Hearing: intact    CN XI   CN XI normal.     CN XII   CN XII normal.     Significant Labs: All pertinent lab results from the past 24 hours have been reviewed.    Significant Studies: I have reviewed all pertinent imaging results/findings within the past 24 hours.    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 Events often preceded by headache and described as 1) staring episodes associated with R facial twitching followed by RUE rhythmic  flexion 2) progresses to GTC. Events triggered by stress and sleep deprivation.    - Continuous vEEG   - Decreased Lacosamide to 100 mg BID on admit, Oxcarbazepine decreased to 150 mg BID on  PM, held VPA on   - AED levels pending  - No typical events  - EEG with mild encephalopathy, left temporoparietal maximum sharp waves, no electrographic seizures  - Activation procedures per protocol- medication adjustments as above, HV/PS, sleep deprivation tonight, benadryl/tramadol 9/8 AM  - IV Ativan PRN for GTC greater than 5 min - please call epilepsy on call before administering  - Seizure precautions, suction and oxygen at bedside  - Fall precautions, side rail padding in place  - Visi monitoring with continuous pulse oximetry   - Telemetry    Intellectual disability  Hx of  - At baseline  - Mother/primary caregiver to remain at bedside    Migraine without aura and without status migrainosus, not intractable  - Chronic  - On VPA  mg BID for migraines; held on   - Supportive care, analgesics PRN    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, now transitioned to DM  Uncontrolled, A1c 9.7  - Home regimen: Detemir 17U BID, Humalog 12U TIDWM +SSI  - Inpatient regimen: increased Detemir to 15U BID, increased Aspart to 12U TIDWM + low dose SSI  - DM diet  - Monitor BGs and adjust insulin as needed    Hypothyroidism  - Chronic  - Continue home Levothyroxine 75 mcg daily        VTE Risk Mitigation (From admission, onward)         Ordered     IP VTE HIGH RISK PATIENT  Once         22 1128     Place sequential compression device  Until discontinued         22 1128                Tracee Parker PA-C  Neurology-Epilepsy  [facility] ([facility])  Staff: Dr. Mcknight

## 2022-09-07 NOTE — ASSESSMENT & PLAN NOTE
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, now transitioned to DM  Uncontrolled, A1c 9.7  - Home regimen: Detemir 17U BID, Humalog 12U TIDWM +SSI  - Inpatient regimen: increased Detemir to 15U BID, increased Aspart to 12U TIDWM + low dose SSI  - DM diet  - Monitor BGs and adjust insulin as needed

## 2022-09-07 NOTE — PLAN OF CARE
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  Per mother, patient lives with her grandmother in a single story trailer home with 4 step(s) to enter.   Per mother, patient was independent with ADLs and used no dme for ambulation prior to admit.  Patient will have assistance from mother upon discharge.  Patient does not attend a coumadin clinic and is not on dialysis. Pt's mother reported that pt is inconsistent with monitoring her blood glucose and that mother is working to get a continuous blood glucose monitor for patient.     Discharge Planning Booklet given to patient/mother and discussed.  All questions addressed.     SW/CM to follow and assist with d/c needs as needed.    Discharge Barriers Identified: Underinsured    Payor: MEDICAID / Plan: MEDICAID OF [place] / Product Type: Government /     Extended Emergency Contact Information  Primary Emergency Contact: Jorge Duenas  Mobile Phone: [phone]  Relation: Mother  Preferred language: English   needed? No    Discharge Plan A: Home with family  Discharge Plan B: [facility] - [facility] - [address]  [address]  [place] [id number]  Phone: [phone] Fax: [phone]      Initial Assessment (most recent)       Adult Discharge Assessment - 09/07/22 1233          Discharge Assessment    Assessment Type Discharge Planning Assessment     Confirmed/corrected address, phone number and insurance Yes     Confirmed Demographics Correct on Facesheet     Source of Information family     Communicated MING with patient/caregiver Date not available/Unable to determine     Reason For Admission Epilepsy with both generalized and focal  features     Lives With grandparent(s)     Facility Arrived From: home     Do you expect to return to your current living situation? Yes     Do you have help at home or someone to help you manage your care at home? Yes     Who are your caregiver(s) and their phone number(s)? mother Jorge Duenas [phone]     Prior to hospitilization cognitive status: Unable to Assess     Current cognitive status: Alert/Oriented     Walking or Climbing Stairs Difficulty none     Dressing/Bathing Difficulty none     Home Accessibility stairs to enter home     Number of Stairs, Main Entrance four     Home Layout Able to live on 1st floor     Equipment Currently Used at Home glucometer     Do you currently have service(s) that help you manage your care at home? Yes     How Many hours does patient receive services 70     Name and Contact number of agency 70 hours/week of sitter services via Medicaid waiver     Do you have prescription coverage? Yes     Who is going to help you get home at discharge? mother Jorge     How do you get to doctors appointments? family or friend will provide     Are you on dialysis? No     Do you take coumadin? No     Discharge Plan A Home with family     Discharge Plan B Home     Discharge Plan discussed with: Patient;Parent(s)     Discharge Barriers Identified Underinsured                          Sandie Chavis LMSW  [id number]

## 2022-09-07 NOTE — HOSPITAL COURSE
9/6>9/7: Admit to EMU. Lacosamide decreased to 100 mg BID on admit; continued home Oxcarbazepine 600 mg BID and VPA  mg BID (Rx for headache management). No typical events. EEG with mild encephalopathy, left temporoparietal maximum sharp waves, no electrographic seizures. Stop VPA and decrease Oxcarbazepine to 150 mg BID. Proceed with provoking measures for event capture- HV/PS today, sleep deprivation tonight, benadryl/tramadol 9/8 AM.  9/7>9/8: Successful sleep deprivation, received benadryl/tramadol this morning. No typical events. EEG unchanged, left temporoparietal sharps, no seizures. Will stop Lacosamide.  9/8>9/9: No typical events overnight. Oxcarbazepine stopped today, will increase ambulation with hopes of capturing event. Is not on any AEDs at this point. Patient and family counseled on need to get glucose under control in the long term.  9/9>9/10: EEG with no definite epileptiform discharges, no electrographic seizures. No typical events. Sleep deprivation tonight, benadryl/tramadol 9/11 AM.  9/10>9/11: Sleep deprivation completed overnight. Received benadryl/tramadol 9/11 AM. EEG with no definite epileptiform discharges, no electrographic seizures. No typical events. Repeat sleep deprivation for event capture.  9/11>9/12: Sleep deprivation completed overnight; benadryl/tramadol held due to seizures overnight. 2 PB activations @2342 and @0642 for electroclinical seizures with right hemispheric onset, possibly in right frontal lobe with secondary generalization; see EEG report for full details. Will give IV Lacosamide load of 450 mg x1 followed by resumed home PO dose of 300 mg BID as well as IV Brivaracetam load of 100 mg x1 followed by PO 50 mg BID. Resume  mg BID for headache. Plan to discontinue Oxcarbazepine on discharge.   9/12>9/13: EEG WNL, no electrographic seizures. Discharged home in stable condition on adjusted AED regimen: Brivaracetam 50 mg BID and Lacosamide 300 mg BID. VPA  500 mg BID resumed for headache. Oxcarbazepine discontinued. Ordered outpatient MRI Epilepsy Protocol [facility]. Outpatient follow up in Epilepsy clinic with Dr. Mcknight.

## 2022-09-07 NOTE — PLAN OF CARE
Problem: Seizure, Active Management  Goal: Absence of Seizure/Seizure-Related Injury  Outcome: Ongoing, Progressing     Problem: Diabetes Comorbidity  Goal: Blood Glucose Level Within Targeted Range  Outcome: Ongoing, Progressing     POC reviewed with the patient and they verbalized understanding. All comments and concerns addressed. Bed locked in lowest position with bed alarm set, call light within reach. Safety precautions maintained. VSS, see flowsheets. Educated to drink water base drinks. Will continue to monitor for changes to POC and clinical condition.

## 2022-09-08 ENCOUNTER — SOCIAL WORK (OUTPATIENT)
Dept: NEUROLOGY | Facility: CLINIC | Age: 22
End: 2022-09-08
Payer: MEDICAID

## 2022-09-08 LAB
LACOSAMIDE: 13.2 MCG/ML (ref 1–10)
OXCARBAZEPINE METABOLITE: 12 MCG/ML (ref 10–35)
POCT GLUCOSE: 305 MG/DL (ref 70–110)
POCT GLUCOSE: 344 MG/DL (ref 70–110)
POCT GLUCOSE: 371 MG/DL (ref 70–110)
POCT GLUCOSE: 397 MG/DL (ref 70–110)

## 2022-09-08 PROCEDURE — 99233 SBSQ HOSP IP/OBS HIGH 50: CPT | Mod: ,,, | Performed by: PSYCHIATRY & NEUROLOGY

## 2022-09-08 PROCEDURE — 95720 EEG PHY/QHP EA INCR W/VEEG: CPT | Mod: ,,, | Performed by: PSYCHIATRY & NEUROLOGY

## 2022-09-08 PROCEDURE — 25000003 PHARM REV CODE 250: Performed by: PHYSICIAN ASSISTANT

## 2022-09-08 PROCEDURE — 94761 N-INVAS EAR/PLS OXIMETRY MLT: CPT

## 2022-09-08 PROCEDURE — 99233 PR SUBSEQUENT HOSPITAL CARE,LEVL III: ICD-10-PCS | Mod: ,,, | Performed by: PSYCHIATRY & NEUROLOGY

## 2022-09-08 PROCEDURE — 63600175 PHARM REV CODE 636 W HCPCS: Performed by: PHYSICIAN ASSISTANT

## 2022-09-08 PROCEDURE — 95714 VEEG EA 12-26 HR UNMNTR: CPT

## 2022-09-08 PROCEDURE — 25000003 PHARM REV CODE 250

## 2022-09-08 PROCEDURE — 95720 PR EEG, W/VIDEO, CONT RECORD, I&R, >12<26 HRS: ICD-10-PCS | Mod: ,,, | Performed by: PSYCHIATRY & NEUROLOGY

## 2022-09-08 PROCEDURE — 11000001 HC ACUTE MED/SURG PRIVATE ROOM

## 2022-09-08 RX ORDER — INSULIN ASPART 100 [IU]/ML
12 INJECTION, SOLUTION INTRAVENOUS; SUBCUTANEOUS
Status: DISCONTINUED | OUTPATIENT
Start: 2022-09-08 | End: 2022-09-08

## 2022-09-08 RX ORDER — INSULIN ASPART 100 [IU]/ML
1-10 INJECTION, SOLUTION INTRAVENOUS; SUBCUTANEOUS
Status: DISCONTINUED | OUTPATIENT
Start: 2022-09-08 | End: 2022-09-13 | Stop reason: HOSPADM

## 2022-09-08 RX ORDER — GLUCAGON 1 MG
1 KIT INJECTION
Status: DISCONTINUED | OUTPATIENT
Start: 2022-09-08 | End: 2022-09-13 | Stop reason: HOSPADM

## 2022-09-08 RX ORDER — IBUPROFEN 200 MG
24 TABLET ORAL
Status: DISCONTINUED | OUTPATIENT
Start: 2022-09-08 | End: 2022-09-13 | Stop reason: HOSPADM

## 2022-09-08 RX ORDER — INSULIN ASPART 100 [IU]/ML
14 INJECTION, SOLUTION INTRAVENOUS; SUBCUTANEOUS
Status: DISCONTINUED | OUTPATIENT
Start: 2022-09-08 | End: 2022-09-13 | Stop reason: HOSPADM

## 2022-09-08 RX ORDER — IBUPROFEN 200 MG
16 TABLET ORAL
Status: DISCONTINUED | OUTPATIENT
Start: 2022-09-08 | End: 2022-09-13 | Stop reason: HOSPADM

## 2022-09-08 RX ADMIN — LACOSAMIDE 100 MG: 100 TABLET, FILM COATED ORAL at 09:09

## 2022-09-08 RX ADMIN — INSULIN ASPART 5 UNITS: 100 INJECTION, SOLUTION INTRAVENOUS; SUBCUTANEOUS at 11:09

## 2022-09-08 RX ADMIN — LEVOTHYROXINE SODIUM 75 MCG: 25 TABLET ORAL at 06:09

## 2022-09-08 RX ADMIN — DIPHENHYDRAMINE HYDROCHLORIDE 50 MG: 50 CAPSULE ORAL at 03:09

## 2022-09-08 RX ADMIN — INSULIN ASPART 4 UNITS: 100 INJECTION, SOLUTION INTRAVENOUS; SUBCUTANEOUS at 08:09

## 2022-09-08 RX ADMIN — INSULIN ASPART 5 UNITS: 100 INJECTION, SOLUTION INTRAVENOUS; SUBCUTANEOUS at 08:09

## 2022-09-08 RX ADMIN — TRAMADOL HYDROCHLORIDE 50 MG: 50 TABLET, COATED ORAL at 03:09

## 2022-09-08 RX ADMIN — INSULIN ASPART 8 UNITS: 100 INJECTION, SOLUTION INTRAVENOUS; SUBCUTANEOUS at 04:09

## 2022-09-08 RX ADMIN — INSULIN ASPART 12 UNITS: 100 INJECTION, SOLUTION INTRAVENOUS; SUBCUTANEOUS at 08:09

## 2022-09-08 RX ADMIN — INSULIN DETEMIR 17 UNITS: 100 INJECTION, SOLUTION SUBCUTANEOUS at 08:09

## 2022-09-08 RX ADMIN — INSULIN ASPART 14 UNITS: 100 INJECTION, SOLUTION INTRAVENOUS; SUBCUTANEOUS at 04:09

## 2022-09-08 RX ADMIN — OXCARBAZEPINE 150 MG: 150 TABLET, FILM COATED ORAL at 09:09

## 2022-09-08 RX ADMIN — CETIRIZINE HYDROCHLORIDE 10 MG: 5 TABLET, FILM COATED ORAL at 08:09

## 2022-09-08 RX ADMIN — INSULIN ASPART 12 UNITS: 100 INJECTION, SOLUTION INTRAVENOUS; SUBCUTANEOUS at 11:09

## 2022-09-08 RX ADMIN — OXCARBAZEPINE 150 MG: 150 TABLET, FILM COATED ORAL at 08:09

## 2022-09-08 NOTE — PROCEDURES
EMU Report    DATE OF PROCEDURE:  9/7/22     EEG NUMBER:  [facility] -[id number]     REFERRING PHYSICIAN: :Genie Villanueva NP      This EEG was performed to characterize the patient's events.     ELECTROENCEPHALOGRAM REPORT     METHODOLOGY:  Electroencephalographic (EEG) recording is recorded with electrodes placed according to the International 10-20 placement system.  Thirty two (32) channels of digital signal (sampling rate of 512/sec), including T1 and T2, were simultaneously recorded from the scalp and may include EKG, EMG, and/or eye monitors.  Recording band pass was 0.1 to 512 Hz.  Digital video recording of the patient is simultaneously recorded with the EEG.  The patient is instructed to report clinical symptoms which may occur during the recording session.  EEG and video recording are stored and archived in digital format.    Activation procedures, which include photic stimulation, hyperventilation and instructing patients to perform simple tasks, are done in selected patients.   The EEG is displayed on a monitor screen and can be reviewed using different montages.  Computer assisted-analysis is employed to detect spike and electrographic seizure activity.   The entire record is submitted for computer analysis.  The entire recording is visually reviewed, and the times identified by computer analysis as being spikes or seizures are reviewed again.    Compressed spectral analysis (CSA) is also performed on the activity recorded from each individual channel.  This is displayed as a power display of frequencies from 0 to 30 Hz over time.   The CSA is reviewed looking for asymmetries in power between homologous areas of the scalp, then compared with the original EEG recording.    [employer] software was also utilized in the review of this study.  This software suite analyzes the EEG recording in multiple domains.  Coherence and rhythmicity are computed to identify EEG sections which may contain organized seizures.  Each  channel undergoes analysis to detect the presence of spike and sharp waves which have special and morphological characteristics of epileptic activity.  The routine EEG recording is converted from special into frequency domain.  This is then displayed comparing homologous areas to identify areas of significant asymmetry.  Algorithm to identify non-cortically generated artifact is used to separate artifact from the EEG.       RECORDING TIMES:   Start on September 7, 2022 at hour 8 minute 51 seconds 17  End on September 7, 2022 at hours 14 minute 5 seconds 34   Restart on September 7, 2022 at hour 40 minutes 2nd 35   End on September 8, 2022 at hours 7 minute 0 seconds 1  The total time of EEG recording for the study was 22 hours and 6 minutes    SEIZURES RECORDED:  During this recording no events were recorded     ELECTROENCEPHALOGRAM:  Interictal:  The recording was obtained with a number of standard bipolar and referential montages during wakefulness, drowsiness and sleep.  In the alert state, the posterior background rhythm was a symmetric, well-modulated 10 Hz alpha rhythm, which reacted symmetrically to eye opening.  Intermittent photic stimulation evokes symmetric posterior responses.  Hyperventilation produced physiological slowing.  No abnormalities were activated photic stimulation or hyperventilation.  Mixed generalized semi synchronous delta range slowing was noted during wakefulness.  During drowsiness, the background rhythm waxed and waned and there were periods of slowing. During stage II sleep, symmetric V waves, K complexes and sleep spindles were noted.  During sleep low-amplitude infrequent epileptiform discharges were noted maximally at T3-T5/C3-P3.     Ictal:  No events recorded      EKG: The EKG channeled revealed normal sinus rhythm     FINAL SUMMARY:  ELECTROENCEPHALOGRAM:  Interictal:  This is an abnormal EEG during wakefulness, drowsiness and sleep.  Mild slowing of the background was  noted.  During sleep low-amplitude infrequent epileptiform discharges were noted maximally at T3-T5/C3-P3.       Ictal:  During this recording no events were recorded      CLINICAL SEIZURE:  Classification:  Focal epilepsy, likely in the left hemisphere     CLINICAL CORRELATION:  The patient is a 22-year-old female with a history of seizures, who is maintained on valproic acid, oxcarbazepine and lacosamide.. This is an abnormal EEG during wakefulness, drowsiness and sleep.  The presence of generalized slowing during wakefulness suggestive mild encephalopathy nonspecific to the cause.  The presence of left temporoparietal maximum sharp waves confers increased risk for focal seizures from this region.  This study no seizures recorded.

## 2022-09-08 NOTE — PLAN OF CARE
POC reviewed and updated with the patient/caregiver. Questions regarding POC were encouraged and addressed with the patient/caregiver.  VSS, see flow-sheets. Tele and VISI maintained. Patient is AO X4 at this time. Fall/safety precautions maintained, no signs of injury noted during shift.  Seizure precautions maintained, no events noted during shift. Patient was repositioned for comfort, bed locked in low position with side rails X 4, bed alarm set, with call light within reach. Instructed patient to call staff for mobility, verbalized understanding.  No acute signs of distress noted at this time.       Problem: Adult Inpatient Plan of Care  Goal: Plan of Care Review  Outcome: Ongoing, Progressing  Goal: Patient-Specific Goal (Individualized)  Outcome: Ongoing, Progressing  Goal: Absence of Hospital-Acquired Illness or Injury  Outcome: Ongoing, Progressing  Goal: Optimal Comfort and Wellbeing  Outcome: Ongoing, Progressing  Goal: Readiness for Transition of Care  Outcome: Ongoing, Progressing     Problem: Diabetes Comorbidity  Goal: Blood Glucose Level Within Targeted Range  Outcome: Ongoing, Progressing     Problem: Seizure, Active Management  Goal: Absence of Seizure/Seizure-Related Injury  Outcome: Ongoing, Progressing     Problem: Fluid Volume Deficit  Goal: Fluid Balance  Outcome: Ongoing, Progressing

## 2022-09-08 NOTE — NURSING
. BS machine will not recognize aem band or CSN number. Received scheduled Detemir as ordered, no sliding scale required.

## 2022-09-08 NOTE — PROGRESS NOTES
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 Lacosamide decreased to 100 mg BID on admit; continued home Oxcarbazepine 600 mg BID and VPA  mg BID (Rx for headache management). No typical events. EEG with mild encephalopathy, left temporoparietal maximum sharp waves, no electrographic seizures. Stop VPA and decrease Oxcarbazepine to 150 mg BID. Proceed with provoking measures for event capture- HV/PS today, sleep deprivation tonight, benadryl/tramadol 9/8 AM.  9/7>9/8: Successful sleep deprivation, received benadryl/tramadol this morning. No typical events. EEG unchanged, left temporoparietal sharps, no seizures. Will stop Lacosamide.      Interval History: NAEON. Successful sleep deprivation overnight, received benadryl/tramadol this morning. This AM, patient sleeping in bed with mother at bedside. No typical events. Discussed plan of care and answered questions at bedside.    Current Facility-Administered Medications   Medication Dose Route Frequency Provider Last Rate Last Admin    acetaminophen tablet 650 mg  650 mg Oral Q4H PRN Nate Guerrero MD        aluminum & magnesium hydroxide-simethicone 400-400-40 mg/5 mL suspension 30 mL  30 mL Oral Q6H PRN Nate Guerrero MD   30 mL at 09/07/22 1347    cetirizine tablet 10 mg  10 mg Oral QHS Nate Guerrero MD   10 mg at 09/07/22 2027    dextrose 10% bolus 125 mL  12.5 g Intravenous PRN Francis Mcknight MD        dextrose 10% bolus 250 mL  25 g Intravenous PRN Francis Mcknight MD        docusate sodium capsule 100 mg  100 mg Oral BID PRN Tracee Parker PA-C        glucagon (human recombinant) injection  1 mg  1 mg Intramuscular PRN Nate Guerrero MD        glucose chewable tablet 16 g  16 g Oral PRN Nate Guerrero MD        glucose chewable tablet 24 g  24 g Oral PRN Nate Guerrero MD        insulin aspart U-100 pen 0-5 Units  0-5 Units Subcutaneous QID (AC + HS) PRN Nate Guerrero MD   4 Units at 09/08/22 0805    insulin aspart U-100 pen 12 Units  12 Units Subcutaneous TIDWM Tracee Parker PA-C   12 Units at 09/08/22 0805    insulin detemir U-100 pen 17 Units  17 Units Subcutaneous BID Nate Guerrero MD        lacosamide tablet 100 mg  100 mg Oral BID Nate Guerrero MD   100 mg at 09/08/22 0939    levothyroxine tablet 75 mcg  75 mcg Oral Before breakfast Nate Guerrero MD   75 mcg at 09/08/22 0616    lorazepam injection 2 mg  2 mg Intravenous Q5 Min PRN Nate Guerrero MD        OXcarbazepine tablet 150 mg  150 mg Oral BID Tracee Parker PA-C   150 mg at 09/08/22 0938    sodium chloride 0.9% flush 10 mL  10 mL Intravenous PRN Nate Guerrero MD         Continuous Infusions:    Review of Systems   Constitutional:  Positive for fatigue (s/p sleep deprivation).   Neurological:  Positive for seizures (none since admit).   All other systems reviewed and are negative.  Objective:     Vital Signs (Most Recent):  Temp: 98.1 °F (36.7 °C) (09/08/22 0625)  Pulse: 60 (09/08/22 0812)  Resp: 12 (09/08/22 0812)  BP: 116/80 (09/08/22 0812)  SpO2: 98 % (09/08/22 0812) Vital Signs (24h Range):  Temp:  [97.4 °F (36.3 °C)-98.5 °F (36.9 °C)] 98.1 °F (36.7 °C)  Pulse:  [60-83] 60  Resp:  [11-20] 12  SpO2:  [96 %-98 %] 98 %  BP: (102-125)/(64-80) 116/80     Weight: 78.3 kg (172 lb 9.9 oz)  Body mass index is 33.71 kg/m².    Physical Exam  Vitals reviewed.   Constitutional:       General: She is not in acute distress.     Appearance: She is not diaphoretic.   HENT:      Head: Normocephalic and atraumatic.      Comments: EEG in place  Eyes:      General: No scleral icterus.        Right eye: No discharge.          Left eye: No discharge.      Extraocular Movements: Extraocular movements intact and EOM normal.      Conjunctiva/sclera: Conjunctivae normal.      Pupils: Pupils are equal, round, and reactive to light.   Cardiovascular:      Rate and Rhythm: Normal rate.   Pulmonary:      Effort: Pulmonary effort is normal. No respiratory distress.   Abdominal:      General: There is no distension.      Palpations: Abdomen is soft.      Tenderness: There is no abdominal tenderness.   Musculoskeletal:         General: No swelling, tenderness or deformity. Normal range of motion.   Skin:     General: Skin is warm and dry.   Neurological:      General: No focal deficit present.      Mental Status: She is alert. Mental status is at baseline.   Psychiatric:         Behavior: Behavior is cooperative.       NEUROLOGICAL EXAMINATION:     MENTAL STATUS   Level of consciousness: alert    CRANIAL NERVES     CN II   Visual fields full to confrontation.     CN III, IV, VI   Pupils are equal, round, and reactive to light.  Extraocular motions are normal.     CN V   Right corneal reflex: normal  Left corneal reflex: normal    CN VII   Facial expression full, symmetric.     CN VIII   Hearing: intact    CN XI   CN XI normal.     CN XII   CN XII normal.     Significant Labs: All pertinent lab results from the past 24 hours have been reviewed.    Significant Studies: I have reviewed all pertinent imaging results/findings within the past 24 hours.    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 Events often preceded by headache and described as 1) staring episodes associated with R facial twitching followed by RUE rhythmic  "flexion 2) progresses to GTC. Events triggered by stress and sleep deprivation.    - Continuous vEEG   - Decreased Lacosamide to 100 mg BID on admit->held on , Oxcarbazepine decreased to 150 mg BID on  PM, held VPA on   - VPA 59.4; other AED levels pending  - No typical events  - EEG with mild encephalopathy, left temporoparietal maximum sharp waves, no electrographic seizures  - Activation procedures per protocol- medication adjustments as above, HV/PS, completed sleep deprivation >, received benadryl/tramadol  AM  - IV Ativan PRN for GTC greater than 5 min - please call epilepsy on call before administering  - Seizure precautions, suction and oxygen at bedside  - Fall precautions, side rail padding in place  - Visi monitoring with continuous pulse oximetry   - Telemetry    Intellectual disability  Hx of  - At baseline  - Mother/primary caregiver to remain at bedside    Migraine without aura and without status migrainosus, not intractable  - Chronic  - On VPA  mg BID for migraines; held on   - Supportive care, analgesics PRN    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, now transitioned to DM  Uncontrolled, A1c 9.7  - Home regimen: Detemir 17U BID, Humalog 12U TIDWM +SSI  - Inpatient regimen: increased Detemir to 15U BID>17U BID, increased Aspart 12U>14U TIDWM + moderate dose SSI  - Nutrition consult completed   - DM diet; RN noted patient is noncompliant with DM diet, "consumes large amount of regular soda as well as snacks that are not sugar free"  - Educated on DM diet  - Monitor BGs and adjust insulin as needed    Hypothyroidism  - Chronic  - Continue home Levothyroxine 75 mcg daily        VTE Risk Mitigation (From admission, onward)         Ordered     IP VTE HIGH RISK PATIENT  Once         22     Place sequential compression device  Until discontinued         22          "       Tracee Parker PA-C  Neurology-Epilepsy  [facility] ([facility])  Staff: Dr. Mcknight

## 2022-09-08 NOTE — PROGRESS NOTES
Epilepsy  met with patient briefly in her room in the EMU.   Patient currently resting and accompanied by mother also seated on the chair next to the bed.      SW introduced himself as a therapist and  that supports patient's with epilepsy and neurological symptoms at the hospital.   ALEX reported he works closely with the epileptologists and neurologists.     SW provided information regarding [facility] [place].   HOME ([email or web address])      LAEX also provided business card with his information.     No questions or concerns at this time.       Tian Sam [facility]    Clinical  -ALS, Epilepsy, Headache  [facility].  [email or web address]  Phone# [phone]     [phone]  Fax # [phone]

## 2022-09-08 NOTE — ASSESSMENT & PLAN NOTE
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

## 2022-09-08 NOTE — ASSESSMENT & PLAN NOTE
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 Events often preceded by headache and described as 1) staring episodes associated with R facial twitching followed by RUE rhythmic flexion 2) progresses to GTC. Events triggered by stress and sleep deprivation.    - Continuous vEEG   - Decreased Lacosamide to 100 mg BID on admit->held on 9/8, Oxcarbazepine decreased to 150 mg BID on 9/7 PM, held VPA on 9/7  - VPA 59.4; other AED levels pending  - No typical events  - EEG with mild encephalopathy, left temporoparietal maximum sharp waves, no electrographic seizures  - Activation procedures per protocol- medication adjustments as above, HV/PS, completed sleep deprivation 9/7>9/8, received benadryl/tramadol 9/8 AM  - IV Ativan PRN for GTC greater than 5 min - please call epilepsy on call before administering  - Seizure precautions, suction and oxygen at bedside  - Fall precautions, side rail padding in place  - Visi monitoring with continuous pulse oximetry   - Telemetry

## 2022-09-08 NOTE — SUBJECTIVE & OBJECTIVE
Interval History: NAEON. Successful sleep deprivation overnight, received benadryl/tramadol this morning. This AM, patient sleeping in bed with mother at bedside. No typical events. Discussed plan of care and answered questions at bedside.    Current Facility-Administered Medications   Medication Dose Route Frequency Provider Last Rate Last Admin    acetaminophen tablet 650 mg  650 mg Oral Q4H PRN Nate Guerrero MD        aluminum & magnesium hydroxide-simethicone 400-400-40 mg/5 mL suspension 30 mL  30 mL Oral Q6H PRN Nate Guerrero MD   30 mL at 09/07/22 1347    cetirizine tablet 10 mg  10 mg Oral QHS Nate Guerrero MD   10 mg at 09/07/22 2027    dextrose 10% bolus 125 mL  12.5 g Intravenous PRN Francis Mcknight MD        dextrose 10% bolus 250 mL  25 g Intravenous PRN Francis Mcnkight MD        docusate sodium capsule 100 mg  100 mg Oral BID PRN Tracee Parker PA-C        glucagon (human recombinant) injection 1 mg  1 mg Intramuscular PRN Nate Guerrero MD        glucose chewable tablet 16 g  16 g Oral PRN Nate Guerrero MD        glucose chewable tablet 24 g  24 g Oral PRN Nate Guerrero MD        insulin aspart U-100 pen 0-5 Units  0-5 Units Subcutaneous QID (AC + HS) PRN Nate Guerrero MD   4 Units at 09/08/22 0805    insulin aspart U-100 pen 12 Units  12 Units Subcutaneous TIDWM Tracee Parker PA-C   12 Units at 09/08/22 0805    insulin detemir U-100 pen 17 Units  17 Units Subcutaneous BID Nate Guerrero MD        lacosamide tablet 100 mg  100 mg Oral BID Nate Guerrero MD   100 mg at 09/08/22 0939    levothyroxine tablet 75 mcg  75 mcg Oral Before breakfast Nate Guerrero MD   75 mcg at 09/08/22 0616    lorazepam injection 2 mg  2 mg Intravenous Q5 Min PRN Nate Guerrero MD        OXcarbazepine tablet 150 mg  150 mg Oral BID Tracee Parker PA-C   150 mg at 09/08/22 0938    sodium chloride 0.9% flush 10 mL  10 mL Intravenous PRN Nate Guerrero MD         Continuous Infusions:    Review of  Systems   Constitutional:  Positive for fatigue (s/p sleep deprivation).   Neurological:  Positive for seizures (none since admit).   All other systems reviewed and are negative.  Objective:     Vital Signs (Most Recent):  Temp: 98.1 °F (36.7 °C) (09/08/22 0625)  Pulse: 60 (09/08/22 0812)  Resp: 12 (09/08/22 0812)  BP: 116/80 (09/08/22 0812)  SpO2: 98 % (09/08/22 0812) Vital Signs (24h Range):  Temp:  [97.4 °F (36.3 °C)-98.5 °F (36.9 °C)] 98.1 °F (36.7 °C)  Pulse:  [60-83] 60  Resp:  [11-20] 12  SpO2:  [96 %-98 %] 98 %  BP: (102-125)/(64-80) 116/80     Weight: 78.3 kg (172 lb 9.9 oz)  Body mass index is 33.71 kg/m².    Physical Exam  Vitals reviewed.   Constitutional:       General: She is not in acute distress.     Appearance: She is not diaphoretic.   HENT:      Head: Normocephalic and atraumatic.      Comments: EEG in place  Eyes:      General: No scleral icterus.        Right eye: No discharge.         Left eye: No discharge.      Extraocular Movements: Extraocular movements intact and EOM normal.      Conjunctiva/sclera: Conjunctivae normal.      Pupils: Pupils are equal, round, and reactive to light.   Cardiovascular:      Rate and Rhythm: Normal rate.   Pulmonary:      Effort: Pulmonary effort is normal. No respiratory distress.   Abdominal:      General: There is no distension.      Palpations: Abdomen is soft.      Tenderness: There is no abdominal tenderness.   Musculoskeletal:         General: No swelling, tenderness or deformity. Normal range of motion.   Skin:     General: Skin is warm and dry.   Neurological:      General: No focal deficit present.      Mental Status: She is alert. Mental status is at baseline.   Psychiatric:         Behavior: Behavior is cooperative.       NEUROLOGICAL EXAMINATION:     MENTAL STATUS   Level of consciousness: alert    CRANIAL NERVES     CN II   Visual fields full to confrontation.     CN III, IV, VI   Pupils are equal, round, and reactive to light.  Extraocular motions  are normal.     CN V   Right corneal reflex: normal  Left corneal reflex: normal    CN VII   Facial expression full, symmetric.     CN VIII   Hearing: intact    CN XI   CN XI normal.     CN XII   CN XII normal.     Significant Labs: All pertinent lab results from the past 24 hours have been reviewed.    Significant Studies: I have reviewed all pertinent imaging results/findings within the past 24 hours.

## 2022-09-08 NOTE — PLAN OF CARE
"  Problem: Adult Inpatient Plan of Care  Goal: Plan of Care Review  Outcome: Ongoing, Progressing     Problem: Adult Inpatient Plan of Care  Goal: Optimal Comfort and Wellbeing  Outcome: Ongoing, Progressing     Problem: Diabetes Comorbidity  Goal: Blood Glucose Level Within Targeted Range  Outcome: Ongoing, Progressing     Problem: Seizure, Active Management  Goal: Absence of Seizure/Seizure-Related Injury  Outcome: Ongoing, Progressing   Patient is AAOx4, makes her needs known, mother at the bedside. Spoke with both of them regarding use of insulin pens at home, states she uses them but does not always check her BS. They have been trying to get the Dexcom system, but so far no luck. Has been educated on need to drink water but states "I don't like it" consumes large amount of regular soda as well as snacks that are not sugar free. Has had no seizures noted thus far, has been on sleep deprivation till 0300, did receive her Tramadol/Benadryl as ordered. VSS. Declined SCD's.   "

## 2022-09-09 LAB
POCT GLUCOSE: 172 MG/DL (ref 70–110)
POCT GLUCOSE: 236 MG/DL (ref 70–110)
POCT GLUCOSE: 288 MG/DL (ref 70–110)
POCT GLUCOSE: 309 MG/DL (ref 70–110)

## 2022-09-09 PROCEDURE — 95714 VEEG EA 12-26 HR UNMNTR: CPT

## 2022-09-09 PROCEDURE — 25000003 PHARM REV CODE 250

## 2022-09-09 PROCEDURE — 11000001 HC ACUTE MED/SURG PRIVATE ROOM

## 2022-09-09 PROCEDURE — 95720 PR EEG, W/VIDEO, CONT RECORD, I&R, >12<26 HRS: ICD-10-PCS | Mod: ,,, | Performed by: PSYCHIATRY & NEUROLOGY

## 2022-09-09 PROCEDURE — 99233 PR SUBSEQUENT HOSPITAL CARE,LEVL III: ICD-10-PCS | Mod: ,,, | Performed by: PSYCHIATRY & NEUROLOGY

## 2022-09-09 PROCEDURE — 25000003 PHARM REV CODE 250: Performed by: PHYSICIAN ASSISTANT

## 2022-09-09 PROCEDURE — 99233 SBSQ HOSP IP/OBS HIGH 50: CPT | Mod: ,,, | Performed by: PSYCHIATRY & NEUROLOGY

## 2022-09-09 PROCEDURE — 95720 EEG PHY/QHP EA INCR W/VEEG: CPT | Mod: ,,, | Performed by: PSYCHIATRY & NEUROLOGY

## 2022-09-09 RX ADMIN — INSULIN ASPART 14 UNITS: 100 INJECTION, SOLUTION INTRAVENOUS; SUBCUTANEOUS at 07:09

## 2022-09-09 RX ADMIN — LEVOTHYROXINE SODIUM 75 MCG: 25 TABLET ORAL at 05:09

## 2022-09-09 RX ADMIN — ACETAMINOPHEN 650 MG: 325 TABLET ORAL at 12:09

## 2022-09-09 RX ADMIN — INSULIN DETEMIR 17 UNITS: 100 INJECTION, SOLUTION SUBCUTANEOUS at 08:09

## 2022-09-09 RX ADMIN — CETIRIZINE HYDROCHLORIDE 10 MG: 5 TABLET, FILM COATED ORAL at 08:09

## 2022-09-09 RX ADMIN — INSULIN ASPART 14 UNITS: 100 INJECTION, SOLUTION INTRAVENOUS; SUBCUTANEOUS at 04:09

## 2022-09-09 RX ADMIN — INSULIN ASPART 2 UNITS: 100 INJECTION, SOLUTION INTRAVENOUS; SUBCUTANEOUS at 11:09

## 2022-09-09 RX ADMIN — OXCARBAZEPINE 150 MG: 150 TABLET, FILM COATED ORAL at 08:09

## 2022-09-09 NOTE — ASSESSMENT & PLAN NOTE
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

## 2022-09-09 NOTE — PLAN OF CARE
POC reviewed with the patient and they verbalized understanding. All comments and concerns addressed. Bed locked in lowest position with bed alarm set, call light within reach.Safety precautions maintained. VSS, see flowsheets. No events this shift. Will continue to monitor for changes to POC and clinical condition. Will report to oncoming nurse at shift change for assumption of care.     Problem: Adult Inpatient Plan of Care  Goal: Plan of Care Review  Outcome: Ongoing, Progressing  Goal: Absence of Hospital-Acquired Illness or Injury  Outcome: Ongoing, Progressing  Goal: Optimal Comfort and Wellbeing  Outcome: Ongoing, Progressing     Problem: Diabetes Comorbidity  Goal: Blood Glucose Level Within Targeted Range  Outcome: Ongoing, Progressing     Problem: Seizure, Active Management  Goal: Absence of Seizure/Seizure-Related Injury  Outcome: Ongoing, Progressing     Problem: Fluid Volume Deficit  Goal: Fluid Balance  Outcome: Ongoing, Progressing

## 2022-09-09 NOTE — NURSING
Patient walked around unit for 15 minutes at this time, no events noted. Patient returned to bed, mother at bedside.

## 2022-09-09 NOTE — ASSESSMENT & PLAN NOTE
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 Events often preceded by headache and described as 1) staring episodes associated with R facial twitching followed by RUE rhythmic flexion 2) progresses to GTC. Events triggered by stress and sleep deprivation.    - Continuous vEEG   - Decreased Lacosamide to 100 mg BID on admit->held on 9/8,  held VPA on 9/7, Oxcarbazepine decreased to 150 mg BID on 9/7 PM, discontinued 9/9  - VPA 59.4; Oxcarbazepine 12.2, Lacosamide 13.2  - No typical events  - EEG with mild encephalopathy, left temporoparietal maximum sharp waves, no electrographic seizures  - Activation procedures per protocol- medication adjustments as above, HV/PS, completed sleep deprivation 9/7>9/8, received benadryl/tramadol 9/8 AM  - IV Ativan PRN for GTC greater than 5 min - please call epilepsy on call before administering  - Seizure precautions, suction and oxygen at bedside  - Fall precautions, side rail padding in place  - Visi monitoring with continuous pulse oximetry   - Telemetry

## 2022-09-09 NOTE — PROCEDURES
EMU Report    DATE OF PROCEDURE:  9/8/22     EEG NUMBER:  [id number]     REFERRING PHYSICIAN: :Genie Villanueva NP      This EEG was performed to characterize the patient's events.     ELECTROENCEPHALOGRAM REPORT     METHODOLOGY:  Electroencephalographic (EEG) recording is recorded with electrodes placed according to the International 10-20 placement system.  Thirty two (32) channels of digital signal (sampling rate of 512/sec), including T1 and T2, were simultaneously recorded from the scalp and may include EKG, EMG, and/or eye monitors.  Recording band pass was 0.1 to 512 Hz.  Digital video recording of the patient is simultaneously recorded with the EEG.  The patient is instructed to report clinical symptoms which may occur during the recording session.  EEG and video recording are stored and archived in digital format.    Activation procedures, which include photic stimulation, hyperventilation and instructing patients to perform simple tasks, are done in selected patients.   The EEG is displayed on a monitor screen and can be reviewed using different montages.  Computer assisted-analysis is employed to detect spike and electrographic seizure activity.   The entire record is submitted for computer analysis.  The entire recording is visually reviewed, and the times identified by computer analysis as being spikes or seizures are reviewed again.    Compressed spectral analysis (CSA) is also performed on the activity recorded from each individual channel.  This is displayed as a power display of frequencies from 0 to 30 Hz over time.   The CSA is reviewed looking for asymmetries in power between homologous areas of the scalp, then compared with the original EEG recording.    [employer] software was also utilized in the review of this study.  This software suite analyzes the EEG recording in multiple domains.  Coherence and rhythmicity are computed to identify EEG sections which may contain organized seizures.  Each  channel undergoes analysis to detect the presence of spike and sharp waves which have special and morphological characteristics of epileptic activity.  The routine EEG recording is converted from special into frequency domain.  This is then displayed comparing homologous areas to identify areas of significant asymmetry.  Algorithm to identify non-cortically generated artifact is used to separate artifact from the EEG.       RECORDING TIMES:   Start on September 8, 2022 at hour 7 minute 0 seconds 54  End on September 9, 2022 at hours 7 minute 0 seconds 3  The total time of EEG recording for the study was 23 hours and 49 minutes    SEIZURES RECORDED:  During this recording no events were recorded     ELECTROENCEPHALOGRAM:  Interictal:  The recording was obtained with a number of standard bipolar and referential montages during wakefulness, drowsiness and sleep.  In the alert state, the posterior background rhythm was a symmetric, well-modulated 10 Hz alpha rhythm, which reacted symmetrically to eye opening.  Intermittent photic stimulation evokes symmetric posterior responses.  Hyperventilation produced physiological slowing.  No abnormalities were activated photic stimulation or hyperventilation.  Mixed generalized semi synchronous delta range slowing was noted during wakefulness.  During drowsiness, the background rhythm waxed and waned and there were periods of slowing. During stage II sleep, symmetric V waves, K complexes and sleep spindles were noted.  During sleep low-amplitude infrequent epileptiform discharges were noted maximally at T3-T5/C3-P3.     Ictal:  No events recorded      EKG: The EKG channeled revealed normal sinus rhythm     FINAL SUMMARY:  ELECTROENCEPHALOGRAM:  Interictal:  This is an abnormal EEG during wakefulness, drowsiness and sleep.  Mild slowing of the background was noted.  During sleep low-amplitude infrequent epileptiform discharges were noted maximally at T3-T5/C3-P3.       Ictal:   During this recording no events were recorded      CLINICAL SEIZURE:  Classification:  Focal epilepsy, likely in the left hemisphere     CLINICAL CORRELATION:  The patient is a 22-year-old female with a history of seizures, who is maintained on valproic acid, oxcarbazepine and lacosamide.. This is an abnormal EEG during wakefulness, drowsiness and sleep.  The presence of generalized slowing during wakefulness suggestive mild encephalopathy nonspecific to the cause.  The presence of left temporoparietal maximum sharp waves confers increased risk for focal seizures from this region.  This study no seizures recorded.

## 2022-09-09 NOTE — SUBJECTIVE & OBJECTIVE
Interval History: NAEON. No typical events. Discussed plan of care and answered questions at bedside. Counseled pt and family on need to get glucose under control in the long run.    Current Facility-Administered Medications   Medication Dose Route Frequency Provider Last Rate Last Admin    acetaminophen tablet 650 mg  650 mg Oral Q4H PRN Nate Guerrero MD   650 mg at 09/09/22 0044    aluminum & magnesium hydroxide-simethicone 400-400-40 mg/5 mL suspension 30 mL  30 mL Oral Q6H PRN Nate Guerrero MD   30 mL at 09/07/22 1347    cetirizine tablet 10 mg  10 mg Oral QHS Nate Guerrero MD   10 mg at 09/08/22 2045    dextrose 10% bolus 125 mL  12.5 g Intravenous PRN Francis Mcknight MD        dextrose 10% bolus 250 mL  25 g Intravenous PRN Francis Mcknight MD        docusate sodium capsule 100 mg  100 mg Oral BID PRN Tracee Parker, PA-C        glucagon (human recombinant) injection 1 mg  1 mg Intramuscular PRN Tracee Parker, PA-C        glucose chewable tablet 16 g  16 g Oral PRN Tracee Parker, PA-C        glucose chewable tablet 24 g  24 g Oral PRN Traceepriscilla Parker, PA-C        insulin aspart U-100 pen 1-10 Units  1-10 Units Subcutaneous QID (AC + HS) PRN Tracee Keith, PA-C   2 Units at 09/09/22 1110    insulin aspart U-100 pen 14 Units  14 Units Subcutaneous TIDWM [facility], [facility]   14 Units at 09/09/22 0739    insulin detemir U-100 pen 19 Units  19 Units Subcutaneous BID Nate Guerrero MD        levothyroxine tablet 75 mcg  75 mcg Oral Before breakfast Nate Guerrero MD   75 mcg at 09/09/22 0540    lorazepam injection 2 mg  2 mg Intravenous Q5 Min PRN Nate Guerrero MD        sodium chloride 0.9% flush 10 mL  10 mL Intravenous PRN Nate Guerrero MD         Continuous Infusions:    Review of Systems   Constitutional:  Positive for fatigue (s/p sleep deprivation).   Neurological:  Positive for seizures (none since admit).   All other systems reviewed and are negative.  Objective:     Vital Signs (Most  Recent):  Temp: 98.1 °F (36.7 °C) (09/09/22 1100)  Pulse: 79 (09/09/22 1100)  Resp: 14 (09/09/22 1100)  BP: 123/81 (09/09/22 1100)  SpO2: 96 % (09/09/22 1100) Vital Signs (24h Range):  Temp:  [97.2 °F (36.2 °C)-98.8 °F (37.1 °C)] 98.1 °F (36.7 °C)  Pulse:  [65-85] 79  Resp:  [12-24] 14  SpO2:  [96 %-98 %] 96 %  BP: ()/(61-89) 123/81     Weight: 78.3 kg (172 lb 9.9 oz)  Body mass index is 33.71 kg/m².    Physical Exam  Vitals reviewed.   Constitutional:       General: She is not in acute distress.     Appearance: She is not diaphoretic.   HENT:      Head: Normocephalic and atraumatic.      Comments: EEG in place  Eyes:      General: No scleral icterus.        Right eye: No discharge.         Left eye: No discharge.      Extraocular Movements: Extraocular movements intact and EOM normal.      Conjunctiva/sclera: Conjunctivae normal.      Pupils: Pupils are equal, round, and reactive to light.   Cardiovascular:      Rate and Rhythm: Normal rate.   Pulmonary:      Effort: Pulmonary effort is normal. No respiratory distress.   Abdominal:      General: There is no distension.      Palpations: Abdomen is soft.      Tenderness: There is no abdominal tenderness.   Musculoskeletal:         General: No swelling, tenderness or deformity. Normal range of motion.   Skin:     General: Skin is warm and dry.   Neurological:      General: No focal deficit present.      Mental Status: She is alert. Mental status is at baseline.   Psychiatric:         Behavior: Behavior is cooperative.       NEUROLOGICAL EXAMINATION:     MENTAL STATUS   Level of consciousness: alert    CRANIAL NERVES     CN II   Visual fields full to confrontation.     CN III, IV, VI   Pupils are equal, round, and reactive to light.  Extraocular motions are normal.     CN V   Right corneal reflex: normal  Left corneal reflex: normal    CN VII   Facial expression full, symmetric.     CN VIII   Hearing: intact    CN XI   CN XI normal.     CN XII   CN XII normal.      Significant Labs: All pertinent lab results from the past 24 hours have been reviewed.    Significant Studies: I have reviewed all pertinent imaging results/findings within the past 24 hours.

## 2022-09-09 NOTE — PROGRESS NOTES
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 Lacosamide decreased to 100 mg BID on admit; continued home Oxcarbazepine 600 mg BID and VPA  mg BID (Rx for headache management). No typical events. EEG with mild encephalopathy, left temporoparietal maximum sharp waves, no electrographic seizures. Stop VPA and decrease Oxcarbazepine to 150 mg BID. Proceed with provoking measures for event capture- HV/PS today, sleep deprivation tonight, benadryl/tramadol 9/8 AM.  9/7>9/8: Successful sleep deprivation, received benadryl/tramadol this morning. No typical events. EEG unchanged, left temporoparietal sharps, no seizures. Will stop Lacosamide.  9/8>9/9: No typical events overnight. Oxcarbazepine stopped today, will increase ambulation with hopes of capturing event. Is not on any AEDs at this point. Patient and family counseled on need to get glucose under control in the long term.      Interval History: NAEON. No typical events. Discussed plan of care and answered questions at bedside. Counseled pt and family on need to get glucose under control in the long run.    Current Facility-Administered Medications   Medication Dose Route Frequency Provider Last Rate Last Admin    acetaminophen tablet 650 mg  650 mg Oral Q4H PRN Nate Guerrero MD   650 mg at 09/09/22 0044    aluminum & magnesium hydroxide-simethicone 400-400-40 mg/5 mL suspension 30 mL  30 mL Oral Q6H PRN Nate Guerrero MD   30 mL at 09/07/22 1347    cetirizine tablet 10 mg  10 mg Oral QHS Nate Guerrero MD   10 mg at 09/08/22 2045    dextrose 10% bolus 125 mL  12.5 g Intravenous PRN Francis  DEVON Mcknight MD        dextrose 10% bolus 250 mL  25 g Intravenous PRN Francis Mcknight MD        docusate sodium capsule 100 mg  100 mg Oral BID PRN Tracee Parker PA-C        glucagon (human recombinant) injection 1 mg  1 mg Intramuscular PRN ROVERTO Bay-CASSIDY        glucose chewable tablet 16 g  16 g Oral PRN ROVERTO Bay-C        glucose chewable tablet 24 g  24 g Oral PRN ROVERTO Bay-C        insulin aspart U-100 pen 1-10 Units  1-10 Units Subcutaneous QID (AC + HS) PRN ROVERTO Bay-C   2 Units at 09/09/22 1110    insulin aspart U-100 pen 14 Units  14 Units Subcutaneous TIDWM ROVERTO [place]-C   14 Units at 09/09/22 0739    insulin detemir U-100 pen 19 Units  19 Units Subcutaneous BID Nate Guerrero MD        levothyroxine tablet 75 mcg  75 mcg Oral Before breakfast Nate Guerrero MD   75 mcg at 09/09/22 0540    lorazepam injection 2 mg  2 mg Intravenous Q5 Min PRN Nate Guerrero MD        sodium chloride 0.9% flush 10 mL  10 mL Intravenous PRN Nate Guerrero MD         Continuous Infusions:    Review of Systems   Constitutional:  Positive for fatigue (s/p sleep deprivation).   Neurological:  Positive for seizures (none since admit).   All other systems reviewed and are negative.  Objective:     Vital Signs (Most Recent):  Temp: 98.1 °F (36.7 °C) (09/09/22 1100)  Pulse: 79 (09/09/22 1100)  Resp: 14 (09/09/22 1100)  BP: 123/81 (09/09/22 1100)  SpO2: 96 % (09/09/22 1100) Vital Signs (24h Range):  Temp:  [97.2 °F (36.2 °C)-98.8 °F (37.1 °C)] 98.1 °F (36.7 °C)  Pulse:  [65-85] 79  Resp:  [12-24] 14  SpO2:  [96 %-98 %] 96 %  BP: ()/(61-89) 123/81     Weight: 78.3 kg (172 lb 9.9 oz)  Body mass index is 33.71 kg/m².    Physical Exam  Vitals reviewed.   Constitutional:       General: She is not in acute distress.     Appearance: She is not diaphoretic.   HENT:      Head: Normocephalic and atraumatic.      Comments: EEG in place  Eyes:      General: No scleral icterus.        Right  eye: No discharge.         Left eye: No discharge.      Extraocular Movements: Extraocular movements intact and EOM normal.      Conjunctiva/sclera: Conjunctivae normal.      Pupils: Pupils are equal, round, and reactive to light.   Cardiovascular:      Rate and Rhythm: Normal rate.   Pulmonary:      Effort: Pulmonary effort is normal. No respiratory distress.   Abdominal:      General: There is no distension.      Palpations: Abdomen is soft.      Tenderness: There is no abdominal tenderness.   Musculoskeletal:         General: No swelling, tenderness or deformity. Normal range of motion.   Skin:     General: Skin is warm and dry.   Neurological:      General: No focal deficit present.      Mental Status: She is alert. Mental status is at baseline.   Psychiatric:         Behavior: Behavior is cooperative.       NEUROLOGICAL EXAMINATION:     MENTAL STATUS   Level of consciousness: alert    CRANIAL NERVES     CN II   Visual fields full to confrontation.     CN III, IV, VI   Pupils are equal, round, and reactive to light.  Extraocular motions are normal.     CN V   Right corneal reflex: normal  Left corneal reflex: normal    CN VII   Facial expression full, symmetric.     CN VIII   Hearing: intact    CN XI   CN XI normal.     CN XII   CN XII normal.     Significant Labs: All pertinent lab results from the past 24 hours have been reviewed.    Significant Studies: I have reviewed all pertinent imaging results/findings within the past 24 hours.    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 Events often preceded by headache and described as 1) staring episodes associated with R facial twitching  "followed by RUE rhythmic flexion 2) progresses to GTC. Events triggered by stress and sleep deprivation.    - Continuous vEEG   - Decreased Lacosamide to 100 mg BID on admit->held on ,  held VPA on , Oxcarbazepine decreased to 150 mg BID on  PM, discontinued   - VPA 59.4; Oxcarbazepine 12.2, Lacosamide 13.2  - No typical events  - EEG with mild encephalopathy, left temporoparietal maximum sharp waves, no electrographic seizures  - Activation procedures per protocol- medication adjustments as above, HV/PS, completed sleep deprivation >, received benadryl/tramadol  AM  - IV Ativan PRN for GTC greater than 5 min - please call epilepsy on call before administering  - Seizure precautions, suction and oxygen at bedside  - Fall precautions, side rail padding in place  - Visi monitoring with continuous pulse oximetry   - Telemetry    Intellectual disability  Hx of  - At baseline  - Mother/primary caregiver to remain at bedside    Migraine without aura and without status migrainosus, not intractable  - Chronic  - On VPA  mg BID for migraines; held on   - Supportive care, analgesics PRN    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, now transitioned to DM  Uncontrolled, A1c 9.7  - Home regimen: Detemir 17U BID, Humalog 12U TIDWM +SSI  - Inpatient regimen: increased Detemir further to 17U BID>19U BID, Aspart remains at 14U TIDWM + moderate dose SSI  - Nutrition consult completed   - DM diet; RN noted patient is noncompliant with DM diet, "consumes large amount of regular soda as well as snacks that are not sugar free"  - Educated on DM diet  - Monitor BGs and adjust insulin as needed    Hypothyroidism  - Chronic  - Continue home Levothyroxine 75 mcg daily        VTE Risk Mitigation (From admission, onward)         Ordered     IP VTE HIGH RISK PATIENT  Once         22 1128     Place sequential compression " device  Until discontinued         09/06/22 1128                Nate Guerrero MD  Neurology-Epilepsy  [facility] - Neurosurgery ([facility])

## 2022-09-09 NOTE — PLAN OF CARE
Problem: Adult Inpatient Plan of Care  Goal: Plan of Care Review  Outcome: Ongoing, Progressing     Problem: Adult Inpatient Plan of Care  Goal: Optimal Comfort and Wellbeing  Outcome: Ongoing, Progressing     Problem: Diabetes Comorbidity  Goal: Blood Glucose Level Within Targeted Range  Outcome: Ongoing, Progressing     Problem: Seizure, Active Management  Goal: Absence of Seizure/Seizure-Related Injury  Outcome: Ongoing, Progressing   Patient has been awake off and on this shift with no seizure activity noted thus far. EEG cap in place. BS high at HS and received prn Novolog as well as scheduled Detemir. Continue to educate her on proper diet for a diabetic especially what to drink, she consumes large amounts of Soda a day, did switch over to Crystal Light and encouraged again to seek a Dex Com or the [employer] system, mother states she has a waiver as well as a , I encouraged her to speak with her SW regarding either or BS checking apparatus. VSS, refused SCD's d/t getting up frequently o use the restroom. Flow sheets completed, see for assessments.

## 2022-09-10 LAB
POCT GLUCOSE: 110 MG/DL (ref 70–110)
POCT GLUCOSE: 154 MG/DL (ref 70–110)
POCT GLUCOSE: 218 MG/DL (ref 70–110)
POCT GLUCOSE: 270 MG/DL (ref 70–110)

## 2022-09-10 PROCEDURE — 95720 PR EEG, W/VIDEO, CONT RECORD, I&R, >12<26 HRS: ICD-10-PCS | Mod: ,,, | Performed by: PSYCHIATRY & NEUROLOGY

## 2022-09-10 PROCEDURE — 25000003 PHARM REV CODE 250

## 2022-09-10 PROCEDURE — 99233 PR SUBSEQUENT HOSPITAL CARE,LEVL III: ICD-10-PCS | Mod: ,,, | Performed by: PSYCHIATRY & NEUROLOGY

## 2022-09-10 PROCEDURE — 95720 EEG PHY/QHP EA INCR W/VEEG: CPT | Mod: ,,, | Performed by: PSYCHIATRY & NEUROLOGY

## 2022-09-10 PROCEDURE — 99233 SBSQ HOSP IP/OBS HIGH 50: CPT | Mod: ,,, | Performed by: PSYCHIATRY & NEUROLOGY

## 2022-09-10 PROCEDURE — 95714 VEEG EA 12-26 HR UNMNTR: CPT

## 2022-09-10 PROCEDURE — 11000001 HC ACUTE MED/SURG PRIVATE ROOM

## 2022-09-10 RX ORDER — TRAMADOL HYDROCHLORIDE 50 MG/1
50 TABLET ORAL ONCE
Status: COMPLETED | OUTPATIENT
Start: 2022-09-11 | End: 2022-09-11

## 2022-09-10 RX ORDER — DIPHENHYDRAMINE HCL 50 MG
50 CAPSULE ORAL ONCE
Status: COMPLETED | OUTPATIENT
Start: 2022-09-11 | End: 2022-09-11

## 2022-09-10 RX ADMIN — ACETAMINOPHEN 650 MG: 325 TABLET ORAL at 03:09

## 2022-09-10 RX ADMIN — INSULIN ASPART 14 UNITS: 100 INJECTION, SOLUTION INTRAVENOUS; SUBCUTANEOUS at 05:09

## 2022-09-10 RX ADMIN — INSULIN ASPART 14 UNITS: 100 INJECTION, SOLUTION INTRAVENOUS; SUBCUTANEOUS at 07:09

## 2022-09-10 RX ADMIN — CETIRIZINE HYDROCHLORIDE 10 MG: 5 TABLET, FILM COATED ORAL at 09:09

## 2022-09-10 RX ADMIN — INSULIN ASPART 14 UNITS: 100 INJECTION, SOLUTION INTRAVENOUS; SUBCUTANEOUS at 11:09

## 2022-09-10 RX ADMIN — ACETAMINOPHEN 650 MG: 325 TABLET ORAL at 09:09

## 2022-09-10 RX ADMIN — LEVOTHYROXINE SODIUM 75 MCG: 25 TABLET ORAL at 05:09

## 2022-09-10 NOTE — PROGRESS NOTES
Progress Note  Epilepsy    CC:  Episodes of staring decreased responsiveness and right-sided facial twitching    Hospital Course:   9/6>9/7: Admit to EMU. Lacosamide decreased to 100 mg BID on admit; continued home Oxcarbazepine 600 mg BID and VPA  mg BID (Rx for headache management). No typical events. EEG with mild encephalopathy, left temporoparietal maximum sharp waves, no electrographic seizures. Stop VPA and decrease Oxcarbazepine to 150 mg BID. Proceed with provoking measures for event capture- HV/PS today, sleep deprivation tonight, benadryl/tramadol 9/8 AM.  9/7>9/8: Successful sleep deprivation, received benadryl/tramadol this morning. No typical events. EEG unchanged, left temporoparietal sharps, no seizures. Will stop Lacosamide.  9/8>9/9: No typical events overnight. Oxcarbazepine stopped today, will increase ambulation with hopes of capturing event. Is not on any AEDs at this point. Patient and family counseled on need to get glucose under control in the long term.  9/9>9/10: Some subtle twitching overnight, no seizures, not sleep deprived     Interval Events:  Some subtle twitching overnight, no seizures, not sleep deprived     ROS: The pt denies headache, loss of vision, blurred vision, diplopia, dysarthria, dysphagia, lightheadedness, vertigo, tinnitus or hearing difficulty. Denies difficulties producing or comprehending speech.  Denies focal weakness, numbness, paresthesias. Denies difficulty with gait. Denies cough, shortness of breath.  Denies chest pain or tightness, palpitations.  Denies nausea, vomiting, diarrhea, constipation or abdominal pain.  No bowel or bladder incontinence or retention.        Past medical history, social history, family history are unchanged from the initial H&P.    EXAM:   - Vitals: /83 (BP Location: Left arm, Patient Position: Lying)   Pulse 95   Temp 98.5 °F (36.9 °C) (Oral)   Resp 10   Ht 5' (1.524 m)   Wt 78.3 kg (172 lb 9.9 oz)   LMP 08/11/2022  (Exact Date)   SpO2 98%   Breastfeeding No   BMI 33.71 kg/m²    - General: Awake, cooperative, NAD.  - HEENT: NC/AT  - Neck: Supple  - Pulmonary: no increased WOB  - Cardiac: well perfused   - Abdomen: soft, nontender, BMI  - Extremities: no edema  - Skin: no rashes or lesions noted.     NEURO EXAM:   - Mental Status: Awake, alert, oriented x 3. Attentive but tangential. Language is fluent with intact repetition and comprehension. Slightly slowed prosody. There were no paraphasic errors. Speech was not dysarthric. Able to follow both midline and appendicular commands.     - Cranial Nerves:  VFF. EOMI. No facial droop. Hearing intact to room voice. 5/5 strength in trapezii and SCM bilaterally.     - Motor: Normal bulk and tone throughout. No pronator drift bilaterally. No adventitious movements such as tremor or asterixis noted.  No evidence of focal weakness.  Formal motor exam deferred.    - Sensory: No deficits to light touch  - Coordination: No dysmetria on FNF   - Gait:  Remains in bed    EEG: Reviewed personally, small sharp spikes during sleep which do organize of unclear significance.      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 Events often preceded by headache and described as 1) staring episodes associated with R facial twitching followed by RUE rhythmic flexion 2) progresses to GTC. Events triggered by stress and sleep deprivation.     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, now transitioned to DM  Uncontrolled, A1c 9.7  - Home regimen: Detemir 17U BID, Humalog 12U TIDWM +SSI  - Inpatient regimen: increased Detemir further to 17U BID>19U BID, Aspart remains at 14U TIDWM + moderate dose SSI  - Nutrition consult completed   - DM diet; RN noted patient is noncompliant with DM diet, "consumes large amount of regular soda as well as snacks that are not sugar free"  - Educated on DM diet  - Monitor BGs and adjust insulin as needed     Hypothyroidism  - Chronic  - Continue home Levothyroxine 75 mcg daily    Zee Cabrera MD PhD  Neurology-Epilepsy  [facility].      Current Facility-Administered Medications:     acetaminophen tablet 650 mg, 650 mg, Oral, Q4H PRN, Nate Guerrero MD, 650 mg at 09/10/22 0347    aluminum & magnesium hydroxide-simethicone 400-400-40 mg/5 mL suspension 30 mL, 30 mL, Oral, Q6H PRN, Nate Guerrero MD, 30 mL at 22 1347    cetirizine tablet 10 mg, 10 mg, Oral, QHS, Nate " MD Cesar, 10 mg at 09/09/22 2025    dextrose 10% bolus 125 mL, 12.5 g, Intravenous, PRN, Francis Mcknight MD    dextrose 10% bolus 250 mL, 25 g, Intravenous, PRN, Francis Mcknight MD    docusate sodium capsule 100 mg, 100 mg, Oral, BID PRN, Tracee Parker PA-C    glucagon (human recombinant) injection 1 mg, 1 mg, Intramuscular, PRN, Tracee Parker, PA-C    glucose chewable tablet 16 g, 16 g, Oral, PRN, Tracee Parker, PA-C    glucose chewable tablet 24 g, 24 g, Oral, PRN, Tracee Parker, PA-C    insulin aspart U-100 pen 1-10 Units, 1-10 Units, Subcutaneous, QID (AC + HS) PRN, Tracee Parker PA-C, 2 Units at 09/09/22 1110    insulin aspart U-100 pen 14 Units, 14 Units, Subcutaneous, TIDWM, Tracee Parker PA-C, 14 Units at 09/10/22 1155    insulin detemir U-100 pen 19 Units, 19 Units, Subcutaneous, BID, Nate Guerrero MD, 19 Units at 09/10/22 0827    levothyroxine tablet 75 mcg, 75 mcg, Oral, Before breakfast, Nate Guerrero MD, 75 mcg at 09/10/22 0504    lorazepam injection 2 mg, 2 mg, Intravenous, Q5 Min PRN, Nate Guerrero MD    sodium chloride 0.9% flush 10 mL, 10 mL, Intravenous, PRN, Nate Guerrero MD    VTE Risk Mitigation (From admission, onward)           Ordered     IP VTE HIGH RISK PATIENT  Once         09/06/22 1128     Place sequential compression device  Until discontinued         09/06/22 1128                    Recent Labs   Lab 07/07/20  0930 06/07/21  1628 06/16/21  1138 12/30/21  1419 09/06/22  1200 09/06/22  1314   WBC  --    < >  --  6.64 7.57  --    Hemoglobin  --    < >  --  13.8 14.0  --    Hematocrit  --    < >  --  40.7 39.3  --    Platelets  --    < >  --  135 L 111 L  --    Sodium  --    < > 138 136 136  --    Potassium  --    < > 4.6 4.0 4.1  --    BUN  --    < > 17 11 16  --    Creatinine  --    < > 0.9 0.78 0.7  --    eGFR if African American  --    < > >60.0 >60.0  --   --    eGFR if non African American  --    < > >60.0 >60.0  --   --    Hemoglobin A1C 7.8 H  --  11.2 H  --   --   9.7 H   TSH 2.815  --  2.815  --   --   --     < > = values in this interval not displayed.       Recent Labs   Lab 01/02/20  1040 06/07/21  1628 11/01/21  1112 09/06/22  1200   Lacosamide  --  11.4 H 15.5 H 13.2 H   Oxcarbazepine 9 21 16 12   Valproic Acid Level  --   --  47.8 L 59.4      Imaging:  Results for orders placed during the hospital encounter of 06/25/21    MRI Brain Without Contrast    Impression  Negative noncontrast enhanced MRI of the brain.      Electronically signed by: Rashi Ling MD  Date:    06/26/2021  Time:    09:00

## 2022-09-10 NOTE — PLAN OF CARE
POC reviewed with the patient and they verbalized understanding. All comments and concerns addressed. Bed locked in lowest position with bed alarm set, call light within reach. Safety precautions maintained. VSS, see flowsheets. No events this shift. Will continue to monitor for changes to POC and clinical condition. Patient to sleep deprive until 4am. Will report to oncoming nurse at shift change for assumption of care.     Problem: Adult Inpatient Plan of Care  Goal: Plan of Care Review  Outcome: Ongoing, Progressing  Goal: Absence of Hospital-Acquired Illness or Injury  Outcome: Ongoing, Progressing  Goal: Optimal Comfort and Wellbeing  Outcome: Ongoing, Progressing  Goal: Readiness for Transition of Care  Outcome: Ongoing, Progressing     Problem: Diabetes Comorbidity  Goal: Blood Glucose Level Within Targeted Range  Outcome: Ongoing, Progressing     Problem: Seizure, Active Management  Goal: Absence of Seizure/Seizure-Related Injury  Outcome: Ongoing, Progressing     Problem: Fluid Volume Deficit  Goal: Fluid Balance  Outcome: Ongoing, Progressing

## 2022-09-10 NOTE — PROCEDURES
[facility] EEG/VIDEO MONITORING REPORT  Davis Duenas  [id number]  2000    DATE OF SERVICE:  09/09/2022  DATE OF ADMISSION: 9/6/2022 11:14 AM    ADMITTING/REQUESTING PROVIDER: Francis Mcknight MD    REASON FOR CONSULT:  22-year-old woman with episodes of staring, decreased responsiveness, and right-sided facial twitching admitted to the epilepsy monitoring unit for event capture and characterization.    METHODOLOGY   Electroencephalographic (EEG) recording is with electrodes placed according to the International 10-20 placement system.  Thirty two (32) channels of digital signal (sampling rate of 512/sec) including T1 and T2 was simultaneously recorded from the scalp and may include  EKG, EMG, and/or eye monitors.  Recording band pass was 0.1 to 512 hz.  Digital video recording of the patient is simultaneously recorded with the EEG.  The patient is instructed report clinical symptoms which may occur during the recording session.  EEG and video recording is stored and archived in digital format.  Activation procedures which include photic stimulation, hyperventilation and instructing patients to perform simple task are done in selected patients.   The EEG is displayed on a monitor screen and can be reviewed using different montages.  Computer assisted analysis is employed to detect spike and electrographic seizure activity.   The entire record is submitted for computer analysis.  The entire recording is visually reviewed and the times identified by computer analysis as being spikes or seizures are reviewed again.  Compresses spectral analysis (CSA) is also performed on the activity recorded from each individual channel.  This is displayed as a power display of frequencies from 0 to 30 Hz over time.   The CSA is reviewed looking for asymmetries in power between homologous areas of the scalp and then compared with the original EEG recording.     [employer] software is also utilized in the review of this study.  This software  suite analyzes the EEG recording in multiple domains.  Coherence and rhythmicity is computed to identify EEG sections which may contain organized seizures.  Each channel undergoes analysis to detect presence of spike and sharp waves which have special and morphological characteristic of epileptic activity.  The routine EEG recording is converted from spacial into frequency domain.  This is then displayed comparing homologous areas to identify areas of significant asymmetry.  Algorithm to identify non-cortically generated artifact is used to separate eye movement, EMG and other artifact from the EEG.      RECORDING TIMES  Start on 09/09/2022 at 07:01 a.m.  Stop on 09/10/2022 at 08:46 a.m.  A total of 25 hours and 27 minutes of EEG recording is obtained.    EEG FINDINGS  Background activity:   The waking background is continuous, relatively symmetric, mixed frequency activity.  There is a well-formed 8.5 hz posterior dominant rhythm seen bilaterally.      There are no pushbutton activations.    Sleep:  The patient transitions from wakefulness to sleep with the appearance of sleep spindles, K complexes, and vertex waves. There are occasional frontally predominant low-voltage sporadic sharply contoured waveforms with a shifting predominance which are not definitely epileptiform.    Activation procedures:   Hyperventilation is not performed  Photic stimulation is not performed    Cardiac Monitor:   Heart rate appears generally regular on a single lead EKG when not obscured by artifact.    Impression:   This is an unremarkable continuous EEG monitoring study.  There are no pushbutton activations, no definite epileptiform discharges, and no electrographic seizures. None of the patient's typical episodes are captured during this recording session.      Zee Cabrera MD PhD  Neurology-Epilepsy  [facility].

## 2022-09-10 NOTE — PLAN OF CARE
Problem: Adult Inpatient Plan of Care  Goal: Plan of Care Review  9/10/2022 0342 by Gaby Robbins LPN  Outcome: Ongoing, Progressing  9/10/2022 0341 by Gaby Robbins LPN  Flowsheets (Taken 9/10/2022 0341)  Plan of Care Reviewed With:   patient   mother     Problem: Adult Inpatient Plan of Care  Goal: Optimal Comfort and Wellbeing  Outcome: Ongoing, Progressing     Problem: Diabetes Comorbidity  Goal: Blood Glucose Level Within Targeted Range  Outcome: Ongoing, Progressing     Problem: Seizure, Active Management  Goal: Absence of Seizure/Seizure-Related Injury  Outcome: Ongoing, Progressing     Problem: Fluid Volume Deficit  Goal: Fluid Balance  Outcome: Ongoing, Progressing   Patient has slept well tonight with no s/sx of distress and no seizure activity noted, EEG Cap in place. Bed in low position with call light in place. Is independent in room. VSS, flow sheets completed see for assessments.

## 2022-09-11 LAB
POCT GLUCOSE: 108 MG/DL (ref 70–110)
POCT GLUCOSE: 124 MG/DL (ref 70–110)
POCT GLUCOSE: 182 MG/DL (ref 70–110)
POCT GLUCOSE: 201 MG/DL (ref 70–110)
POCT GLUCOSE: 235 MG/DL (ref 70–110)
POCT GLUCOSE: 242 MG/DL (ref 70–110)
POCT GLUCOSE: 254 MG/DL (ref 70–110)
POCT GLUCOSE: 267 MG/DL (ref 70–110)
POCT GLUCOSE: 50 MG/DL (ref 70–110)
POCT GLUCOSE: 63 MG/DL (ref 70–110)

## 2022-09-11 PROCEDURE — 11000001 HC ACUTE MED/SURG PRIVATE ROOM

## 2022-09-11 PROCEDURE — 25000003 PHARM REV CODE 250: Performed by: PSYCHIATRY & NEUROLOGY

## 2022-09-11 PROCEDURE — 95720 EEG PHY/QHP EA INCR W/VEEG: CPT | Mod: ,,, | Performed by: PSYCHIATRY & NEUROLOGY

## 2022-09-11 PROCEDURE — 25000003 PHARM REV CODE 250: Performed by: PHYSICIAN ASSISTANT

## 2022-09-11 PROCEDURE — 99233 SBSQ HOSP IP/OBS HIGH 50: CPT | Mod: ,,, | Performed by: PSYCHIATRY & NEUROLOGY

## 2022-09-11 PROCEDURE — 95720 PR EEG, W/VIDEO, CONT RECORD, I&R, >12<26 HRS: ICD-10-PCS | Mod: ,,, | Performed by: PSYCHIATRY & NEUROLOGY

## 2022-09-11 PROCEDURE — 95714 VEEG EA 12-26 HR UNMNTR: CPT

## 2022-09-11 PROCEDURE — 25000003 PHARM REV CODE 250

## 2022-09-11 PROCEDURE — 99233 PR SUBSEQUENT HOSPITAL CARE,LEVL III: ICD-10-PCS | Mod: ,,, | Performed by: PSYCHIATRY & NEUROLOGY

## 2022-09-11 RX ORDER — TRAMADOL HYDROCHLORIDE 50 MG/1
50 TABLET ORAL ONCE
Status: DISCONTINUED | OUTPATIENT
Start: 2022-09-12 | End: 2022-09-12

## 2022-09-11 RX ORDER — DIPHENHYDRAMINE HCL 50 MG
50 CAPSULE ORAL ONCE
Status: DISCONTINUED | OUTPATIENT
Start: 2022-09-12 | End: 2022-09-12

## 2022-09-11 RX ADMIN — CETIRIZINE HYDROCHLORIDE 10 MG: 5 TABLET, FILM COATED ORAL at 09:09

## 2022-09-11 RX ADMIN — ACETAMINOPHEN 650 MG: 325 TABLET ORAL at 02:09

## 2022-09-11 RX ADMIN — TRAMADOL HYDROCHLORIDE 50 MG: 50 TABLET, COATED ORAL at 08:09

## 2022-09-11 RX ADMIN — LEVOTHYROXINE SODIUM 75 MCG: 25 TABLET ORAL at 06:09

## 2022-09-11 RX ADMIN — Medication 16 G: at 12:09

## 2022-09-11 RX ADMIN — INSULIN ASPART 4 UNITS: 100 INJECTION, SOLUTION INTRAVENOUS; SUBCUTANEOUS at 08:09

## 2022-09-11 RX ADMIN — INSULIN ASPART 14 UNITS: 100 INJECTION, SOLUTION INTRAVENOUS; SUBCUTANEOUS at 08:09

## 2022-09-11 RX ADMIN — INSULIN ASPART 14 UNITS: 100 INJECTION, SOLUTION INTRAVENOUS; SUBCUTANEOUS at 04:09

## 2022-09-11 RX ADMIN — INSULIN ASPART 6 UNITS: 100 INJECTION, SOLUTION INTRAVENOUS; SUBCUTANEOUS at 02:09

## 2022-09-11 RX ADMIN — ACETAMINOPHEN 650 MG: 325 TABLET ORAL at 06:09

## 2022-09-11 RX ADMIN — DIPHENHYDRAMINE HYDROCHLORIDE 50 MG: 50 CAPSULE ORAL at 08:09

## 2022-09-11 NOTE — NURSING
Patient c/o severe headache rated 10 on 1-10 scale. EMU on call provider Rhianna Brady MD notified. States she will discuss with AM team additional meds to be precribed for patient's headache given seizure history and precautions. Also gave TO for Tylenol to be given Q4, as already ordered in the MAR. Tylenol given. VS and patient condition stable at this time. Will continue to monitor.

## 2022-09-11 NOTE — PLAN OF CARE
Problem: Adult Inpatient Plan of Care  Goal: Plan of Care Review  Outcome: Ongoing, Progressing  Flowsheets (Taken 9/11/2022 0307)  Plan of Care Reviewed With:   patient   family     Problem: Diabetes Comorbidity  Goal: Blood Glucose Level Within Targeted Range  Outcome: Ongoing, Progressing  Intervention: Monitor and Manage Glycemia  Flowsheets (Taken 9/11/2022 0307)  Glycemic Management: blood glucose monitored     Problem: Seizure, Active Management  Goal: Absence of Seizure/Seizure-Related Injury  Outcome: Ongoing, Progressing   POC reviewed with patient and parents, understanding verbalized. Pt c/o mild headache, medicated with prn tylenol. Continuous EEG and Tele monitoring ongoing. BG checked and insulin administered per order and VSS. All AEDs stopped and pt sleep deprived till 4 am 9/11; to sleep from 4-9 am. No acute events or seizure-like activity overnight. Fall and seizure precautions maintained; bed locked and in lowest position. Side rails up, locked and padded x4. Call light and and personal belongings within reach. See flow sheet for full assessment and VS info; mother remains at bedside. Will continue to monitor.

## 2022-09-11 NOTE — PROGRESS NOTES
Progress Note  Epilepsy    CC:  Episodes of staring decreased responsiveness and right-sided facial twitching    Hospital Course:   9/6>9/7: Admit to EMU. Lacosamide decreased to 100 mg BID on admit; continued home Oxcarbazepine 600 mg BID and VPA  mg BID (Rx for headache management). No typical events. EEG with mild encephalopathy, left temporoparietal maximum sharp waves, no electrographic seizures. Stop VPA and decrease Oxcarbazepine to 150 mg BID. Proceed with provoking measures for event capture- HV/PS today, sleep deprivation tonight, benadryl/tramadol 9/8 AM.  9/7>9/8: Successful sleep deprivation, received benadryl/tramadol this morning. No typical events. EEG unchanged, left temporoparietal sharps, no seizures. Will stop Lacosamide.  9/8>9/9: No typical events overnight. Oxcarbazepine stopped today, will increase ambulation with hopes of capturing event. Is not on any AEDs at this point. Patient and family counseled on need to get glucose under control in the long term.  9/9>9/10: Some subtle twitching overnight, no seizures, not sleep deprived   9/10>9/11:  All AEDs discontinued, slept from 04:00 a.m. to 09:00 a.m., no events    Interval Events:  All AEDs discontinued, slept from 04:00 a.m. to 09:00 a.m., no events    ROS:  Starting to get discouraged and frustrated. The pt denies headache, loss of vision, blurred vision, diplopia, dysarthria, dysphagia, lightheadedness, vertigo, tinnitus or hearing difficulty. Denies difficulties producing or comprehending speech.  Denies focal weakness, numbness, paresthesias. Denies difficulty with gait. Denies cough, shortness of breath.  Denies chest pain or tightness, palpitations.  Denies nausea, vomiting, diarrhea, constipation or abdominal pain.  No bowel or bladder incontinence or retention.        Past medical history, social history, family history are unchanged from the initial H&P.    EXAM:   - Vitals: BP (!) 109/59 (BP Location: Right arm, Patient  Position: Lying)   Pulse 98   Temp 97.2 °F (36.2 °C) (Oral)   Resp 18   Ht 5' (1.524 m)   Wt 78.3 kg (172 lb 9.9 oz)   LMP 08/11/2022 (Exact Date)   SpO2 97%   Breastfeeding No   BMI 33.71 kg/m²    - General: Awake, cooperative, tearful.  - HEENT: NC/AT  - Neck: Supple  - Pulmonary: no increased WOB  - Cardiac: well perfused   - Abdomen: soft, nontender, BMI  - Extremities: no edema  - Skin: no rashes or lesions noted.     NEURO EXAM:   - Mental Status: Awake, alert, oriented x 3. Attentive but tangential. Language is fluent with intact repetition and comprehension. Slightly slowed prosody. There were no paraphasic errors. Speech was not dysarthric. Able to follow both midline and appendicular commands.     - Cranial Nerves:  VFF. EOMI. No facial droop. Hearing intact to room voice. 5/5 strength in trapezii and SCM bilaterally.     - Motor: Normal bulk and tone throughout. No pronator drift bilaterally. No adventitious movements such as tremor or asterixis noted.  No evidence of focal weakness.  Formal motor exam deferred.    - Sensory: No deficits to light touch  - Coordination: No dysmetria on FNF   - Gait:  Remains in bed    EEG: Reviewed personally, small sharp spikes during sleep which do organize of unclear significance.      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 Events often preceded by headache and described as 1) staring episodes associated with R facial twitching followed by RUE rhythmic flexion 2) progresses to GTC. Events triggered by stress and sleep deprivation.     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 on , and again - with diphenhydramine/tramadol at 09:00 a.m. 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, now transitioned to DM  Uncontrolled, A1c 9.7  - Home regimen: Detemir 17U BID, Humalog 12U TIDWM +SSI  - Inpatient regimen: increased Detemir further to 17U BID>19U BID, Aspart remains at 14U TIDWM + moderate dose SSI  - Nutrition consult completed   - DM diet; RN noted patient is noncompliant with DM diet, "consumes large amount of regular soda as well as snacks that are not sugar free"  - Educated on DM diet  - Monitor BGs and adjust insulin as needed     Hypothyroidism  - Chronic  - Continue home Levothyroxine 75 mcg daily    Zee Cabrera MD PhD  Neurology-Epilepsy  [facility].      Current Facility-Administered Medications:     acetaminophen tablet 650 mg, 650 mg, Oral, Q4H PRN, Nate Guerrero MD, 650 mg at " 09/10/22 2113    aluminum & magnesium hydroxide-simethicone 400-400-40 mg/5 mL suspension 30 mL, 30 mL, Oral, Q6H PRN, Nate Guerrero MD, 30 mL at 09/07/22 1347    cetirizine tablet 10 mg, 10 mg, Oral, QHS, Nate Guerrero MD, 10 mg at 09/10/22 2105    dextrose 10% bolus 125 mL, 12.5 g, Intravenous, PRN, Francis Mcknight MD    dextrose 10% bolus 250 mL, 25 g, Intravenous, PRN, Francis Mcknight MD    docusate sodium capsule 100 mg, 100 mg, Oral, BID PRN, Tracee Parker PA-C    glucagon (human recombinant) injection 1 mg, 1 mg, Intramuscular, PRN, Tracee Parker PA-C    glucose chewable tablet 16 g, 16 g, Oral, PRN, ROVERTO Bay-C, 16 g at 09/11/22 1206    glucose chewable tablet 24 g, 24 g, Oral, PRN, CATHY BayC    insulin aspart U-100 pen 1-10 Units, 1-10 Units, Subcutaneous, QID (AC + HS) PRN, Tracee Parker PA-C, 4 Units at 09/11/22 0852    insulin aspart U-100 pen 14 Units, 14 Units, Subcutaneous, TIDWM, Tracee Parker PA-C, 14 Units at 09/11/22 0849    insulin detemir U-100 pen 19 Units, 19 Units, Subcutaneous, BID, Nate Guerrero MD, 19 Units at 09/11/22 0850    levothyroxine tablet 75 mcg, 75 mcg, Oral, Before breakfast, Nate Guerrero MD, 75 mcg at 09/11/22 0637    lorazepam injection 2 mg, 2 mg, Intravenous, Q5 Min PRN, Nate Guerrero MD    sodium chloride 0.9% flush 10 mL, 10 mL, Intravenous, PRN, Nate Guerrero MD    VTE Risk Mitigation (From admission, onward)           Ordered     IP VTE HIGH RISK PATIENT  Once         09/06/22 1128     Place sequential compression device  Until discontinued         09/06/22 1128                    Recent Labs   Lab 07/07/20  0930 06/07/21  1628 06/16/21  1138 12/30/21  1419 09/06/22  1200 09/06/22  1314   WBC  --    < >  --  6.64 7.57  --    Hemoglobin  --    < >  --  13.8 14.0  --    Hematocrit  --    < >  --  40.7 39.3  --    Platelets  --    < >  --  135 L 111 L  --    Sodium  --    < > 138 136 136  --    Potassium  --    < > 4.6 4.0 4.1  --     BUN  --    < > 17 11 16  --    Creatinine  --    < > 0.9 0.78 0.7  --    eGFR if African American  --    < > >60.0 >60.0  --   --    eGFR if non African American  --    < > >60.0 >60.0  --   --    Hemoglobin A1C 7.8 H  --  11.2 H  --   --  9.7 H   TSH 2.815  --  2.815  --   --   --     < > = values in this interval not displayed.       Recent Labs   Lab 01/02/20  1040 06/07/21  1628 11/01/21  1112 09/06/22  1200   Lacosamide  --  11.4 H 15.5 H 13.2 H   Oxcarbazepine 9 21 16 12   Valproic Acid Level  --   --  47.8 L 59.4      Imaging:  Results for orders placed during the hospital encounter of 06/25/21    MRI Brain Without Contrast    Impression  Negative noncontrast enhanced MRI of the brain.      Electronically signed by: Rashi Ling MD  Date:    06/26/2021  Time:    09:00

## 2022-09-11 NOTE — PLAN OF CARE
Problem: Adult Inpatient Plan of Care  Goal: Plan of Care Review  Outcome: Ongoing, Progressing  Goal: Patient-Specific Goal (Individualized)  Outcome: Ongoing, Progressing  Goal: Absence of Hospital-Acquired Illness or Injury  Outcome: Ongoing, Progressing  Goal: Optimal Comfort and Wellbeing  Outcome: Ongoing, Progressing  Goal: Readiness for Transition of Care  Outcome: Ongoing, Progressing     Problem: Diabetes Comorbidity  Goal: Blood Glucose Level Within Targeted Range  Outcome: Ongoing, Progressing     Problem: Seizure, Active Management  Goal: Absence of Seizure/Seizure-Related Injury  Outcome: Ongoing, Progressing     Problem: Fluid Volume Deficit  Goal: Fluid Balance  Outcome: Ongoing, Progressing    POC reviewed with pt and parents,verbalized understanding. Pt C/o of mild headache this afternoon, medicated with tylenol. BG checked and insulin administered. All AEDs stopped and pt sleep deprived until 0400 9/12. Fall and seizure precautions maintained. Bed locked in lowest position, bed alarm set, call light within reach. Will continue to monitor.

## 2022-09-11 NOTE — PROCEDURES
[facility] EEG/VIDEO MONITORING REPORT  Davis Duenas  [id number]  2000    DATE OF SERVICE:  09/10/2022  DATE OF ADMISSION: 9/6/2022 11:14 AM    ADMITTING/REQUESTING PROVIDER: Francis Mcknight MD    REASON FOR CONSULT:  22-year-old woman with episodes of staring, decreased responsiveness, and right-sided facial twitching admitted to the epilepsy monitoring unit for event capture and characterization.    METHODOLOGY   Electroencephalographic (EEG) recording is with electrodes placed according to the International 10-20 placement system.  Thirty two (32) channels of digital signal (sampling rate of 512/sec) including T1 and T2 was simultaneously recorded from the scalp and may include  EKG, EMG, and/or eye monitors.  Recording band pass was 0.1 to 512 hz.  Digital video recording of the patient is simultaneously recorded with the EEG.  The patient is instructed report clinical symptoms which may occur during the recording session.  EEG and video recording is stored and archived in digital format.  Activation procedures which include photic stimulation, hyperventilation and instructing patients to perform simple task are done in selected patients.   The EEG is displayed on a monitor screen and can be reviewed using different montages.  Computer assisted analysis is employed to detect spike and electrographic seizure activity.   The entire record is submitted for computer analysis.  The entire recording is visually reviewed and the times identified by computer analysis as being spikes or seizures are reviewed again.  Compresses spectral analysis (CSA) is also performed on the activity recorded from each individual channel.  This is displayed as a power display of frequencies from 0 to 30 Hz over time.   The CSA is reviewed looking for asymmetries in power between homologous areas of the scalp and then compared with the original EEG recording.     [employer] software is also utilized in the review of this study.  This software  suite analyzes the EEG recording in multiple domains.  Coherence and rhythmicity is computed to identify EEG sections which may contain organized seizures.  Each channel undergoes analysis to detect presence of spike and sharp waves which have special and morphological characteristic of epileptic activity.  The routine EEG recording is converted from spacial into frequency domain.  This is then displayed comparing homologous areas to identify areas of significant asymmetry.  Algorithm to identify non-cortically generated artifact is used to separate eye movement, EMG and other artifact from the EEG.      RECORDING TIMES  Start on 09/10/2022 at 10:14 a.m.  Stop on 09/11/2022 at 07:00 a.m.  A total of 20 hours and 40 minutes of EEG recording is obtained.    EEG FINDINGS  Background activity:   The waking background is continuous, relatively symmetric, mixed frequency activity.  There is a well-formed 10 hz posterior dominant rhythm seen bilaterally.      There are no pushbutton activations.    Sleep:  The patient transitions from wakefulness to sleep with the appearance of sleep spindles, K complexes, and vertex waves. There are occasional frontally predominant low-voltage sporadic sharply contoured waveforms with a shifting predominance which are not definitely epileptiform.    Activation procedures:   Hyperventilation is not performed  Photic stimulation is not performed    Cardiac Monitor:   Heart rate appears generally regular on a single lead EKG when not obscured by artifact.    Impression:   This is an unremarkable continuous EEG monitoring study.  There are no pushbutton activations, no definite epileptiform discharges, and no electrographic seizures. None of the patient's typical episodes are captured during this recording session.      Zee Cabrera MD PhD  Neurology-Epilepsy  [facility].

## 2022-09-12 LAB
POCT GLUCOSE: 121 MG/DL (ref 70–110)
POCT GLUCOSE: 173 MG/DL (ref 70–110)
POCT GLUCOSE: 181 MG/DL (ref 70–110)
POCT GLUCOSE: 188 MG/DL (ref 70–110)
POCT GLUCOSE: 221 MG/DL (ref 70–110)

## 2022-09-12 PROCEDURE — 99233 PR SUBSEQUENT HOSPITAL CARE,LEVL III: ICD-10-PCS | Mod: ,,, | Performed by: PSYCHIATRY & NEUROLOGY

## 2022-09-12 PROCEDURE — 25000003 PHARM REV CODE 250: Performed by: PHYSICIAN ASSISTANT

## 2022-09-12 PROCEDURE — 99233 PR SUBSEQUENT HOSPITAL CARE,LEVL III: ICD-10-PCS | Mod: ,,, | Performed by: PHYSICIAN ASSISTANT

## 2022-09-12 PROCEDURE — 95720 PR EEG, W/VIDEO, CONT RECORD, I&R, >12<26 HRS: ICD-10-PCS | Mod: ,,, | Performed by: PSYCHIATRY & NEUROLOGY

## 2022-09-12 PROCEDURE — 11000001 HC ACUTE MED/SURG PRIVATE ROOM

## 2022-09-12 PROCEDURE — 25000003 PHARM REV CODE 250

## 2022-09-12 PROCEDURE — C9254 INJECTION, LACOSAMIDE: HCPCS | Performed by: PHYSICIAN ASSISTANT

## 2022-09-12 PROCEDURE — 95714 VEEG EA 12-26 HR UNMNTR: CPT

## 2022-09-12 PROCEDURE — 63600175 PHARM REV CODE 636 W HCPCS: Performed by: PHYSICIAN ASSISTANT

## 2022-09-12 PROCEDURE — 99233 SBSQ HOSP IP/OBS HIGH 50: CPT | Mod: ,,, | Performed by: PHYSICIAN ASSISTANT

## 2022-09-12 PROCEDURE — C9399 UNCLASSIFIED DRUGS OR BIOLOG: HCPCS | Performed by: PHYSICIAN ASSISTANT

## 2022-09-12 PROCEDURE — 63600175 PHARM REV CODE 636 W HCPCS

## 2022-09-12 PROCEDURE — 99233 SBSQ HOSP IP/OBS HIGH 50: CPT | Mod: ,,, | Performed by: PSYCHIATRY & NEUROLOGY

## 2022-09-12 PROCEDURE — 95720 EEG PHY/QHP EA INCR W/VEEG: CPT | Mod: ,,, | Performed by: PSYCHIATRY & NEUROLOGY

## 2022-09-12 RX ORDER — PROCHLORPERAZINE EDISYLATE 5 MG/ML
10 INJECTION INTRAMUSCULAR; INTRAVENOUS ONCE
Status: DISCONTINUED | OUTPATIENT
Start: 2022-09-12 | End: 2022-09-13 | Stop reason: HOSPADM

## 2022-09-12 RX ORDER — ONDANSETRON 4 MG/1
4 TABLET, ORALLY DISINTEGRATING ORAL EVERY 8 HOURS PRN
Status: DISCONTINUED | OUTPATIENT
Start: 2022-09-12 | End: 2022-09-13 | Stop reason: HOSPADM

## 2022-09-12 RX ORDER — LACOSAMIDE 100 MG/1
300 TABLET ORAL EVERY 12 HOURS
Status: DISCONTINUED | OUTPATIENT
Start: 2022-09-12 | End: 2022-09-13 | Stop reason: HOSPADM

## 2022-09-12 RX ORDER — VALPROIC ACID 250 MG/1
500 CAPSULE, LIQUID FILLED ORAL 2 TIMES DAILY
Status: DISCONTINUED | OUTPATIENT
Start: 2022-09-12 | End: 2022-09-13 | Stop reason: HOSPADM

## 2022-09-12 RX ORDER — KETOROLAC TROMETHAMINE 30 MG/ML
30 INJECTION, SOLUTION INTRAMUSCULAR; INTRAVENOUS ONCE
Status: COMPLETED | OUTPATIENT
Start: 2022-09-12 | End: 2022-09-12

## 2022-09-12 RX ORDER — MAGNESIUM SULFATE HEPTAHYDRATE 40 MG/ML
2 INJECTION, SOLUTION INTRAVENOUS ONCE
Status: COMPLETED | OUTPATIENT
Start: 2022-09-12 | End: 2022-09-12

## 2022-09-12 RX ADMIN — INSULIN ASPART 2 UNITS: 100 INJECTION, SOLUTION INTRAVENOUS; SUBCUTANEOUS at 04:09

## 2022-09-12 RX ADMIN — LACOSAMIDE 400 MG: 10 INJECTION, SOLUTION INTRAVENOUS at 12:09

## 2022-09-12 RX ADMIN — MAGNESIUM SULFATE 2 G: 2 INJECTION INTRAVENOUS at 08:09

## 2022-09-12 RX ADMIN — BRIVARACETAM 100 MG: 50 INJECTION, SUSPENSION INTRAVENOUS at 01:09

## 2022-09-12 RX ADMIN — ACETAMINOPHEN 650 MG: 325 TABLET ORAL at 07:09

## 2022-09-12 RX ADMIN — INSULIN ASPART 4 UNITS: 100 INJECTION, SOLUTION INTRAVENOUS; SUBCUTANEOUS at 12:09

## 2022-09-12 RX ADMIN — INSULIN ASPART 2 UNITS: 100 INJECTION, SOLUTION INTRAVENOUS; SUBCUTANEOUS at 08:09

## 2022-09-12 RX ADMIN — LEVOTHYROXINE SODIUM 75 MCG: 25 TABLET ORAL at 05:09

## 2022-09-12 RX ADMIN — VALPROIC ACID 500 MG: 250 CAPSULE, LIQUID FILLED ORAL at 09:09

## 2022-09-12 RX ADMIN — CETIRIZINE HYDROCHLORIDE 10 MG: 5 TABLET, FILM COATED ORAL at 09:09

## 2022-09-12 RX ADMIN — KETOROLAC TROMETHAMINE 30 MG: 30 INJECTION, SOLUTION INTRAMUSCULAR; INTRAVENOUS at 08:09

## 2022-09-12 RX ADMIN — LACOSAMIDE 300 MG: 100 TABLET, FILM COATED ORAL at 09:09

## 2022-09-12 RX ADMIN — BRIVARACETAM 50 MG: 10 SOLUTION ORAL at 09:09

## 2022-09-12 NOTE — ASSESSMENT & PLAN NOTE
"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, now transitioned to DM  Uncontrolled, A1c 9.7  - Home regimen: Detemir 17U BID, Humalog 12U TIDWM +SSI  - Inpatient regimen: Detemir 19U BID, Aspart 14U TIDWM + moderate dose SSI  - Nutrition consult completed   - DM diet; RN noted patient is noncompliant with DM diet, "consumes large amount of regular soda as well as snacks that are not sugar free"  - Educated on DM diet  - Monitor BGs and adjust insulin as needed  "

## 2022-09-12 NOTE — PROGRESS NOTES
[facility] ([facility])  Neurology-Epilepsy  Progress Note    Patient Name: Davis Duenas  MRN: [id number]  Admission Date: 9/6/2022  Hospital Length of Stay: 5 days  Code Status: Full Code   Attending Provider: Jayme Mcknight Jr., MD  Primary Care Physician: Liborio Patel Jr, MD   Principal Problem:Focal epilepsy    Subjective:     Hospital Course:   9/6>9/7: Admit to EMU. Lacosamide decreased to 100 mg BID on admit; continued home Oxcarbazepine 600 mg BID and VPA  mg BID (Rx for headache management). No typical events. EEG with mild encephalopathy, left temporoparietal maximum sharp waves, no electrographic seizures. Stop VPA and decrease Oxcarbazepine to 150 mg BID. Proceed with provoking measures for event capture- HV/PS today, sleep deprivation tonight, benadryl/tramadol 9/8 AM.  9/7>9/8: Successful sleep deprivation, received benadryl/tramadol this morning. No typical events. EEG unchanged, left temporoparietal sharps, no seizures. Will stop Lacosamide.  9/8>9/9: No typical events overnight. Oxcarbazepine stopped today, will increase ambulation with hopes of capturing event. Is not on any AEDs at this point. Patient and family counseled on need to get glucose under control in the long term.  9/9>9/10: EEG with no definite epileptiform discharges, no electrographic seizures. No typical events. Sleep deprivation tonight, benadryl/tramadol 9/11 AM.  9/10>9/11: Sleep deprivation completed overnight. Received benadryl/tramadol 9/11 AM. EEG with no definite epileptiform discharges, no electrographic seizures. No typical events. Repeat sleep deprivation for event capture.  9/11>9/12: Sleep deprivation completed overnight; benadryl/tramadol held due to seizures overnight. 2 PB activations @2342 and @0642 for electroclinical seizures with right hemispheric onset, possibly in right frontal lobe with secondary generalization; see EEG report for full details. Will give IV Lacosamide load of 450 mg x1 followed by  resumed home PO dose of 300 mg BID as well as IV Brivaracetam load of 100 mg x1 followed by PO 50 mg BID. Resume  mg BID for headache. Plan to discontinue Oxcarbazepine on discharge.       Interval History: Sleep deprivation completed overnight. 2 PB activations for seizures @2342 and @0642. This AM, patient sitting upright in bed with mother at bedside. Patient endorses nausea, headache, and fatigue. She is eager for discharge home. Given PO antiemetics. Patient lost IV access during seizures; RN at bedside replacing IV. Discussed plan of care with patient and mother, including EEG findings and adjusted AED regimen. Answered questions at bedside.    Current Facility-Administered Medications   Medication Dose Route Frequency Provider Last Rate Last Admin    acetaminophen tablet 650 mg  650 mg Oral Q4H PRN Nate Guerrero MD   650 mg at 09/12/22 0732    aluminum & magnesium hydroxide-simethicone 400-400-40 mg/5 mL suspension 30 mL  30 mL Oral Q6H PRN Nate Guerrero MD   30 mL at 09/07/22 1347    brivaracetam (Briviact) 100 mg in dextrose 5 % 50 mL IVPB  100 mg Intravenous Once Tracee Parker PA-C        Followed by    brivaracetam 10 mg/mL oral liquid (PEDS) 50 mg  50 mg Oral BID Tracee Parker PA-C        cetirizine tablet 10 mg  10 mg Oral QHS Nate Guerrero MD   10 mg at 09/11/22 2126    dextrose 10% bolus 125 mL  12.5 g Intravenous PRN Francis Mcknight MD        dextrose 10% bolus 250 mL  25 g Intravenous PRN Francis Mcknight MD        docusate sodium capsule 100 mg  100 mg Oral BID PRN Tracee Parker PA-C        glucagon (human recombinant) injection 1 mg  1 mg Intramuscular PRN Tracee Parker PA-C        glucose chewable tablet 16 g  16 g Oral PRN Tracee Parker PA-C   16 g at 09/11/22 1206    glucose chewable tablet 24 g  24 g Oral PRN Tracee Parker PA-C        insulin aspart U-100 pen 1-10 Units  1-10 Units Subcutaneous QID (AC + HS) PRN Tracee Parker PA-C   2 Units at 09/12/22 0800     insulin aspart U-100 pen 14 Units  14 Units Subcutaneous TIDWM Tracee Parker PA-C   14 Units at 09/11/22 1631    insulin detemir U-100 pen 19 Units  19 Units Subcutaneous BID Nate Guerrero MD   19 Units at 09/12/22 0759    lacosamide (VIMPAT) 400 mg in sodium chloride 0.9% 100 mL IVPB  400 mg Intravenous Once Tracee Parker PA-C        lacosamide tablet 300 mg  300 mg Oral Q12H Tracee Parker PA-C        levothyroxine tablet 75 mcg  75 mcg Oral Before breakfast Nate Guerrero MD   75 mcg at 09/12/22 0540    lorazepam injection 2 mg  2 mg Intravenous Q5 Min PRN Nate Guerrero MD        ondansetron disintegrating tablet 4 mg  4 mg Oral Q8H PRN Nate Guerrero MD        prochlorperazine injection Soln 10 mg  10 mg Intravenous Once Nate Guerrero MD        sodium chloride 0.9% flush 10 mL  10 mL Intravenous PRN Nate Guerrero MD        valproic acid capsule 500 mg  500 mg Oral BID Tracee Parker PA-C         Continuous Infusions:    Review of Systems   Constitutional:  Positive for fatigue.   Gastrointestinal:  Positive for nausea and vomiting.   Neurological:  Positive for seizures and headaches.   All other systems reviewed and are negative.  Objective:     Vital Signs (Most Recent):  Temp: 98.1 °F (36.7 °C) (09/12/22 0758)  Pulse: 78 (09/12/22 1049)  Resp: 18 (09/12/22 0758)  BP: 113/64 (09/12/22 0758)  SpO2: 97 % (09/12/22 0758) Vital Signs (24h Range):  Temp:  [97.2 °F (36.2 °C)-98.7 °F (37.1 °C)] 98.1 °F (36.7 °C)  Pulse:  [71-98] 78  Resp:  [16-18] 18  SpO2:  [97 %-99 %] 97 %  BP: (100-113)/(57-64) 113/64     Weight: 78.3 kg (172 lb 9.9 oz)  Body mass index is 33.71 kg/m².    Physical Exam  Vitals reviewed.   Constitutional:       General: She is not in acute distress.     Appearance: She is not diaphoretic.   HENT:      Head: Normocephalic and atraumatic.      Comments: EEG in place  Eyes:      General: No scleral icterus.        Right eye: No discharge.         Left eye: No discharge.       Extraocular Movements: Extraocular movements intact and EOM normal.      Conjunctiva/sclera: Conjunctivae normal.      Pupils: Pupils are equal, round, and reactive to light.   Cardiovascular:      Rate and Rhythm: Normal rate.   Pulmonary:      Effort: Pulmonary effort is normal. No respiratory distress.   Abdominal:      General: There is no distension.      Palpations: Abdomen is soft.      Tenderness: There is no abdominal tenderness.   Musculoskeletal:         General: No swelling, tenderness or deformity. Normal range of motion.   Skin:     General: Skin is warm and dry.   Neurological:      General: No focal deficit present.      Mental Status: She is alert. Mental status is at baseline.   Psychiatric:         Behavior: Behavior is cooperative.       NEUROLOGICAL EXAMINATION:     MENTAL STATUS   Level of consciousness: alert    CRANIAL NERVES     CN II   Visual fields full to confrontation.     CN III, IV, VI   Pupils are equal, round, and reactive to light.  Extraocular motions are normal.     CN V   Right corneal reflex: normal  Left corneal reflex: normal    CN VII   Facial expression full, symmetric.     CN VIII   Hearing: intact    CN XI   CN XI normal.     CN XII   CN XII normal.     Significant Labs: All pertinent lab results from the past 24 hours have been reviewed.    Significant Studies: I have reviewed all pertinent imaging results/findings within the past 24 hours.    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 Events often preceded by headache and described as 1) staring episodes associated with R facial twitching followed by RUE rhythmic flexion 2) progresses to GTC. Events triggered by stress and sleep  "deprivation.    - Continuous vEEG   - VPA 59.4, OXC 12.2, LCM 13.2  - 2 PB activations  @1552 and  @6282 for electroclinical seizures with right hemispheric onset, possibly in right frontal lobe with secondary generalization  - Will give IV Lacosamide load of 450 mg x1 followed by resumed home PO dose of 300 mg BID as well as IV Brivaracetam load of 100 mg x1 followed by PO 50 mg BID  - Resume  mg BID for headache  - Plan to discontinue Oxcarbazepine on discharge  - Activation procedures per protocol- none  - IV Ativan PRN for GTC greater than 5 min - please call epilepsy on call before administering  - Seizure precautions, suction and oxygen at bedside  - Fall precautions, side rail padding in place  - Visi monitoring with continuous pulse oximetry   - Telemetry  - Will need outpatient MRI Epilepsy Protocol [facility] and follow up in Epilepsy clinic    Intellectual disability  Hx of  - At baseline  - Mother/primary caregiver to remain at bedside    Migraine without aura and without status migrainosus, not intractable  - Chronic  - On VPA  mg BID for migraines; held on  and resumed   - Supportive care, analgesics PRN    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, now transitioned to DM  Uncontrolled, A1c 9.7  - Home regimen: Detemir 17U BID, Humalog 12U TIDWM +SSI  - Inpatient regimen: Detemir 19U BID, Aspart 14U TIDWM + moderate dose SSI  - Nutrition consult completed   - DM diet; RN noted patient is noncompliant with DM diet, "consumes large amount of regular soda as well as snacks that are not sugar free"  - Educated on DM diet  - Monitor BGs and adjust insulin as needed    Hypothyroidism  - Chronic  - Continue home Levothyroxine 75 mcg daily      VTE Risk Mitigation (From admission, onward)         Ordered     IP VTE HIGH RISK PATIENT  Once         22 1128     Place sequential compression device  " Until discontinued         09/06/22 1128                Tracee Parker PA-C  Neurology-Epilepsy  [facility] ([facility])  Staff: Dr. Mcknight

## 2022-09-12 NOTE — SUBJECTIVE & OBJECTIVE
Interval History: Sleep deprivation completed overnight. 2 PB activations for seizures @2342 and @0642. This AM, patient sitting upright in bed with mother at bedside. Patient endorses nausea, headache, and fatigue. She is eager for discharge home. Given PO antiemetics. Patient lost IV access during seizures; RN at bedside replacing IV. Discussed plan of care with patient and mother, including EEG findings and adjusted AED regimen. Answered questions at bedside.    Current Facility-Administered Medications   Medication Dose Route Frequency Provider Last Rate Last Admin    acetaminophen tablet 650 mg  650 mg Oral Q4H PRN Nate Guerrero MD   650 mg at 09/12/22 0732    aluminum & magnesium hydroxide-simethicone 400-400-40 mg/5 mL suspension 30 mL  30 mL Oral Q6H PRN Nate Guerrero MD   30 mL at 09/07/22 1347    brivaracetam (Briviact) 100 mg in dextrose 5 % 50 mL IVPB  100 mg Intravenous Once Tracee Parker PA-C        Followed by    brivaracetam 10 mg/mL oral liquid (PEDS) 50 mg  50 mg Oral BID Tracee Parker PA-C        cetirizine tablet 10 mg  10 mg Oral QHS Nate Guerrero MD   10 mg at 09/11/22 2126    dextrose 10% bolus 125 mL  12.5 g Intravenous PRN Francis Mcknight MD        dextrose 10% bolus 250 mL  25 g Intravenous PRN Francis Mcknight MD        docusate sodium capsule 100 mg  100 mg Oral BID PRN Tracee Parker PA-C        glucagon (human recombinant) injection 1 mg  1 mg Intramuscular PRN Tracee Parker PA-C        glucose chewable tablet 16 g  16 g Oral PRN Tracee Parker PA-C   16 g at 09/11/22 1206    glucose chewable tablet 24 g  24 g Oral PRN Tracee Parker PA-C        insulin aspart U-100 pen 1-10 Units  1-10 Units Subcutaneous QID (AC + HS) PRN Tracee Parker PA-C   2 Units at 09/12/22 0800    insulin aspart U-100 pen 14 Units  14 Units Subcutaneous TIDWM Tracee Parker PA-C   14 Units at 09/11/22 1631    insulin detemir U-100 pen 19 Units  19 Units Subcutaneous BID Nate Guerrero MD   19 Units at  09/12/22 0759    lacosamide (VIMPAT) 400 mg in sodium chloride 0.9% 100 mL IVPB  400 mg Intravenous Once Tracee Parker PA-C        lacosamide tablet 300 mg  300 mg Oral Q12H Tracee Parker PA-C        levothyroxine tablet 75 mcg  75 mcg Oral Before breakfast Nate Guerrero MD   75 mcg at 09/12/22 0540    lorazepam injection 2 mg  2 mg Intravenous Q5 Min PRN Nate Guerrero MD        ondansetron disintegrating tablet 4 mg  4 mg Oral Q8H PRN Nate Guerrero MD        prochlorperazine injection Soln 10 mg  10 mg Intravenous Once Nate Guerrero MD        sodium chloride 0.9% flush 10 mL  10 mL Intravenous PRN Nate Guerrero MD        valproic acid capsule 500 mg  500 mg Oral BID Tracee Parker PA-C         Continuous Infusions:    Review of Systems   Constitutional:  Positive for fatigue.   Gastrointestinal:  Positive for nausea and vomiting.   Neurological:  Positive for seizures and headaches.   All other systems reviewed and are negative.  Objective:     Vital Signs (Most Recent):  Temp: 98.1 °F (36.7 °C) (09/12/22 0758)  Pulse: 78 (09/12/22 1049)  Resp: 18 (09/12/22 0758)  BP: 113/64 (09/12/22 0758)  SpO2: 97 % (09/12/22 0758) Vital Signs (24h Range):  Temp:  [97.2 °F (36.2 °C)-98.7 °F (37.1 °C)] 98.1 °F (36.7 °C)  Pulse:  [71-98] 78  Resp:  [16-18] 18  SpO2:  [97 %-99 %] 97 %  BP: (100-113)/(57-64) 113/64     Weight: 78.3 kg (172 lb 9.9 oz)  Body mass index is 33.71 kg/m².    Physical Exam  Vitals reviewed.   Constitutional:       General: She is not in acute distress.     Appearance: She is not diaphoretic.   HENT:      Head: Normocephalic and atraumatic.      Comments: EEG in place  Eyes:      General: No scleral icterus.        Right eye: No discharge.         Left eye: No discharge.      Extraocular Movements: Extraocular movements intact and EOM normal.      Conjunctiva/sclera: Conjunctivae normal.      Pupils: Pupils are equal, round, and reactive to light.   Cardiovascular:      Rate and Rhythm:  Normal rate.   Pulmonary:      Effort: Pulmonary effort is normal. No respiratory distress.   Abdominal:      General: There is no distension.      Palpations: Abdomen is soft.      Tenderness: There is no abdominal tenderness.   Musculoskeletal:         General: No swelling, tenderness or deformity. Normal range of motion.   Skin:     General: Skin is warm and dry.   Neurological:      General: No focal deficit present.      Mental Status: She is alert. Mental status is at baseline.   Psychiatric:         Behavior: Behavior is cooperative.       NEUROLOGICAL EXAMINATION:     MENTAL STATUS   Level of consciousness: alert    CRANIAL NERVES     CN II   Visual fields full to confrontation.     CN III, IV, VI   Pupils are equal, round, and reactive to light.  Extraocular motions are normal.     CN V   Right corneal reflex: normal  Left corneal reflex: normal    CN VII   Facial expression full, symmetric.     CN VIII   Hearing: intact    CN XI   CN XI normal.     CN XII   CN XII normal.     Significant Labs: All pertinent lab results from the past 24 hours have been reviewed.    Significant Studies: I have reviewed all pertinent imaging results/findings within the past 24 hours.

## 2022-09-12 NOTE — PLAN OF CARE
Problem: Adult Inpatient Plan of Care  Goal: Plan of Care Review  Outcome: Ongoing, Progressing  Flowsheets (Taken 9/12/2022 1746)  Plan of Care Reviewed With:   patient   mother  Goal: Patient-Specific Goal (Individualized)  Outcome: Ongoing, Progressing  Flowsheets (Taken 9/12/2022 1746)  Anxieties, Fears or Concerns: none  Individualized Care Needs: none  Patient-Specific Goals (Include Timeframe): none         POC reviewed with the patient and they verbalized understanding. All comments and concerns addressed. Bed locked in lowest position with bed alarm set, call light within reach. Safety precautions maintained. VSS, see flowsheets. No events this shift. Seizure precautions maintained. EEG, telemetry monitoring, and VISI mobile in place.

## 2022-09-12 NOTE — ASSESSMENT & PLAN NOTE
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 Events often preceded by headache and described as 1) staring episodes associated with R facial twitching followed by RUE rhythmic flexion 2) progresses to GTC. Events triggered by stress and sleep deprivation.    - Continuous vEEG   - VPA 59.4, OXC 12.2, LCM 13.2  - 2 PB activations 9/11 @2342 and 9/12 @0642 for electroclinical seizures with right hemispheric onset, possibly in right frontal lobe with secondary generalization  - Will give IV Lacosamide load of 450 mg x1 followed by resumed home PO dose of 300 mg BID as well as IV Brivaracetam load of 100 mg x1 followed by PO 50 mg BID  - Resume  mg BID for headache  - Plan to discontinue Oxcarbazepine on discharge  - Activation procedures per protocol- none  - IV Ativan PRN for GTC greater than 5 min - please call epilepsy on call before administering  - Seizure precautions, suction and oxygen at bedside  - Fall precautions, side rail padding in place  - Visi monitoring with continuous pulse oximetry   - Telemetry  - Will need outpatient MRI Epilepsy Protocol [facility] and follow up in Epilepsy clinic

## 2022-09-12 NOTE — PLAN OF CARE
Problem: Adult Inpatient Plan of Care  Goal: Plan of Care Review  Outcome: Ongoing, Progressing  Flowsheets (Taken 9/12/2022 0227)  Plan of Care Reviewed With:   patient   mother  Goal: Optimal Comfort and Wellbeing  Outcome: Ongoing, Progressing     Problem: Diabetes Comorbidity  Goal: Blood Glucose Level Within Targeted Range  Outcome: Ongoing, Progressing  Intervention: Monitor and Manage Glycemia  Flowsheets (Taken 9/12/2022 0227)  Glycemic Management: blood glucose monitored     Problem: Seizure, Active Management  Goal: Absence of Seizure/Seizure-Related Injury  Outcome: Ongoing, Progressing  Intervention: Prevent Seizure-Related Injury  Flowsheets (Taken 9/12/2022 0227)  Seizure Precautions:   activity supervised   clutter-free environment maintained   emergency equipment at bedside   side rails padded     POC reviewed with patient and mother, understanding verbalized. Pt c/o mild headache a few hours before her seizure event. See seizure note for details. Continuous EEG, Visi, and Tele monitoring ongoing. BG checked and insulin administered per order and VSS. Pt was to be sleep deprived till 4 am 9/12; but now sleeping post-ictal. Fall and seizure precautions maintained; bed locked and in lowest position. Side rails up, locked and padded x4. Call light and and personal belongings within reach. See flow sheet for full assessment and VS info; mother remains at bedside. Will continue to monitor.

## 2022-09-12 NOTE — PROCEDURES
Date of service  9/11/2022-9/12/2022    Introduction  Electroencephalographic (EEG) is recorded with electrodes placed according to the International 10-20 placement system.  Thirty two (32) channels  of digital signal (sampling rate of 512/sec), including T1 and T2, were simultaneously recorded from the scalp and may include EKG, EMG, and/or eye monitors.  Recording band pass was 0.1 to 512 Hz.  Digital video recording of the patient is simultaneously recorded with the EEG.  The patient is instructed to report clinical symptoms which may occur during the recording session.  EEG and video recording are stored and archived in digital format.  Activation procedures, which include photic stimulation, hyperventilation and instructing patients to perform simple tasks, are done in selected patients.    The EEG is displayed on a monitor screen and can be reviewed using different montages.  Computer-assisted analysis is employed to detect spike and electrographic seizure activity.   The entire record is submitted for computer analysis.  The entire recording is visually reviewed, and the times identified by computer analysis as being spikes or seizures are reviewed again.      Compressed spectral analysis (CSA) is also performed on the activity recorded from each individual channel.  This is displayed as a power display of frequencies from 0 to 30 Hz over time.   The CSA is reviewed looking for asymmetries in power between homologous areas of the scalp, then compared with the original EEG recording.      [employer] software was also utilized in the review of this study. This software suite analyzes the EEG recording in multiple domains. Coherence and rhythmicity are computed to identify EEG sections which may contain organized seizures. Each channel undergoes analysis to detect the presence of spike and sharp waves which have special and morphological characteristics of epileptic activity. The routine EEG recording is converted  from special into frequency domain. This is then displayed comparing homologous areas to identify areas of significant asymmetry. Algorithm to identify non-cortically generated artifact is used to separate artifact from the EEG.    Provider - Jayme Mcknight Jr. MD  RECORDING TIMES  Start on 9/11/22 at 0701   Stop on 9/12/22 at 0700  Total EEG recording time - 23.5 hrs     EEG FINDINGS  Waking State:  The patient's background consists of a posteriorly dominant 10 Hz alpha rhythm, which is reasonably well formed, reasonably well modulated, and abolishes with eye opening.  Anteriorly, the patient's background consists of predominantly beta range frequencies.  There is no focal slowing.  There are no focal, lateralized, or epileptiform transients.     Sleep state:  Normal sleep architecture is seen with features of stage N2 sleep appreciated.     Activation procedures:   Hyperventilation and photic stimulation were not performed.     Seizure/Events(s) recorded -   Seizure #1:  Start on 9/11/22 at 23:41:41, stop at 23:43:48  The seizure begins at 23:41:41 with the emergence of a sharply contoured 8 Hz theta seen broadly over the right hemisphere.  This slowing gradually becomes more pronounced.  By approximately 23:42:19 the underlying EEG becomes obscured by muscle/movement artifact.  Following the seizure, diffuse slowing is noted.  The clinical onset of the seizure occurs at approximately 23:43:19.  At that time, the patient begins exhibiting turning of the head to the left as well as chaotic, asynchronous jerking movements.  She also has vocalizations in the form of wordless shrieking with gasping.  At 23:42:29, she is lying on her left side under a blanket.  Her right arm demonstrates tonic extension with the left arm being unable to be visualized.  By 23:42:35, she clearly demonstrates generalized tonus, subsequently demonstrating generalized clonic activity.        Seizure #2:  Start on 9/12/22 at 06:46:55, stop  at 06:48:41  This seizure was substantially similar to seizure #1 from both an electrographic and semiologic perspective.    Cardiac Monitor:   Sinus rhythm     Interpretation  This is an abnormal LTM-EEG study due to the presence of 2 clinical seizures as noted above.  This study indicates the presence of a focal onset epilepsy with a suspected right hemispheric onset, possibly in the right frontal lobe.

## 2022-09-12 NOTE — NURSING
EMU SEIZURE EVENT NOTE  Time event started: 23:42  Duration: 2 Mins  Describe symptoms during event and post-ictal symptoms:Pt screamed out loud with facial grimace as described by mom. Rhythmic movement of all extremities, with tonic posturing of BLE. Urinary incontinence noted. Pt had eyes closed. Post-ictal patient was sleepy, responds to voice, and follows commands.  Who notified: Dr. Brady  Intervention: Pt placed on side and suctioned appropriately. Oxygen placed via NC. Vital signs taken q5min during event and post VS taken, capillary blood glucose checked. Standardized neurological assessment completed.   Patient response: Neurological assessment performed with patient only responding to painful stimuli. Had eyes closed, mouth open with tongue slightly protruding, and unable to respond verbally. VSS at this time. See flowsheets for more event information.    (adjust note appropriately, IF cardiopulmonary unstable please notify CHARGE NURSE and call [facility] RESPONSE TEAM @ [phone])

## 2022-09-12 NOTE — NURSING
EMU SEIZURE EVENT NOTE  Time event started: 06:42  Duration:4 mins  Describe symptoms during event and post-ictal symptoms: Pt screamed out loud. Tonic extension posturing noted, face turned blue, eyes rolled back, tongue protruding out and drooling. Pt also had urinary incontinence. Post-ictal patient became very irritable and restless, kicking her legs, turning continuously in bed; not following commands and speech garbled.  Who notified: EMU Team  Intervention: Pt placed on side and suctioned appropriately. Oxygen placed via NC Vital signs taken q5min during event and post VS taken. Standardized neurological assessment completed.   Patient response: Neurological assessment performed with pt confused and not following commands.VSS at this time. Bp: 179/90, HR: 78  Spo2:98% RR:12  (adjust note appropriately, IF cardiopulmonary unstable please notify CHARGE NURSE and call [facility] RESPONSE TEAM @ [phone])

## 2022-09-13 VITALS
SYSTOLIC BLOOD PRESSURE: 117 MMHG | WEIGHT: 172.63 LBS | OXYGEN SATURATION: 97 % | HEIGHT: 60 IN | HEART RATE: 88 BPM | TEMPERATURE: 99 F | BODY MASS INDEX: 33.89 KG/M2 | DIASTOLIC BLOOD PRESSURE: 68 MMHG | RESPIRATION RATE: 19 BRPM

## 2022-09-13 LAB — POCT GLUCOSE: 82 MG/DL (ref 70–110)

## 2022-09-13 PROCEDURE — 99239 HOSP IP/OBS DSCHRG MGMT >30: CPT | Mod: ,,, | Performed by: PSYCHIATRY & NEUROLOGY

## 2022-09-13 PROCEDURE — 25000003 PHARM REV CODE 250

## 2022-09-13 PROCEDURE — 63600175 PHARM REV CODE 636 W HCPCS: Performed by: PHYSICIAN ASSISTANT

## 2022-09-13 PROCEDURE — 99239 PR HOSPITAL DISCHARGE DAY,>30 MIN: ICD-10-PCS | Mod: ,,, | Performed by: PSYCHIATRY & NEUROLOGY

## 2022-09-13 PROCEDURE — 25000003 PHARM REV CODE 250: Performed by: PHYSICIAN ASSISTANT

## 2022-09-13 PROCEDURE — C9399 UNCLASSIFIED DRUGS OR BIOLOG: HCPCS | Performed by: PHYSICIAN ASSISTANT

## 2022-09-13 RX ADMIN — BRIVARACETAM 50 MG: 10 SOLUTION ORAL at 09:09

## 2022-09-13 RX ADMIN — VALPROIC ACID 500 MG: 250 CAPSULE, LIQUID FILLED ORAL at 08:09

## 2022-09-13 RX ADMIN — LACOSAMIDE 300 MG: 100 TABLET, FILM COATED ORAL at 08:09

## 2022-09-13 RX ADMIN — INSULIN ASPART 14 UNITS: 100 INJECTION, SOLUTION INTRAVENOUS; SUBCUTANEOUS at 07:09

## 2022-09-13 RX ADMIN — LEVOTHYROXINE SODIUM 75 MCG: 25 TABLET ORAL at 05:09

## 2022-09-13 NOTE — ASSESSMENT & PLAN NOTE
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 Events often preceded by headache and described as 1) staring episodes associated with R facial twitching followed by RUE rhythmic flexion 2) progresses to GTC. Events triggered by stress and sleep deprivation.    - VPA 59.4, OXC 12.2, LCM 13.2  - 2 PB activations 9/11 @2342 and 9/12 @0642 for electroclinical seizures with right hemispheric onset, possibly in right frontal lobe with secondary generalization  - Discharged home in stable condition on adjusted AED regimen: Brivaracetam 50 mg BID and Lacosamide 300 mg BID  -  mg BID resumed for headache  - Oxcarbazepine discontinued  - Ordered outpatient MRI Epilepsy Protocol [facility]  - Outpatient follow up in Epilepsy clinic with Dr. Mcknight

## 2022-09-13 NOTE — PROCEDURES
Date of service  9/12/2022-9/13/2022    Introduction  Electroencephalographic (EEG) is recorded with electrodes placed according to the International 10-20 placement system.  Thirty two (32) channels  of digital signal (sampling rate of 512/sec), including T1 and T2, were simultaneously recorded from the scalp and may include EKG, EMG, and/or eye monitors.  Recording band pass was 0.1 to 512 Hz.  Digital video recording of the patient is simultaneously recorded with the EEG.  The patient is instructed to report clinical symptoms which may occur during the recording session.  EEG and video recording are stored and archived in digital format.  Activation procedures, which include photic stimulation, hyperventilation and instructing patients to perform simple tasks, are done in selected patients.    The EEG is displayed on a monitor screen and can be reviewed using different montages.  Computer-assisted analysis is employed to detect spike and electrographic seizure activity.   The entire record is submitted for computer analysis.  The entire recording is visually reviewed, and the times identified by computer analysis as being spikes or seizures are reviewed again.      Compressed spectral analysis (CSA) is also performed on the activity recorded from each individual channel.  This is displayed as a power display of frequencies from 0 to 30 Hz over time.   The CSA is reviewed looking for asymmetries in power between homologous areas of the scalp, then compared with the original EEG recording.      [employer] software was also utilized in the review of this study. This software suite analyzes the EEG recording in multiple domains. Coherence and rhythmicity are computed to identify EEG sections which may contain organized seizures. Each channel undergoes analysis to detect the presence of spike and sharp waves which have special and morphological characteristics of epileptic activity. The routine EEG recording is converted  from special into frequency domain. This is then displayed comparing homologous areas to identify areas of significant asymmetry. Algorithm to identify non-cortically generated artifact is used to separate artifact from the EEG.    Provider - Jayme Mcknight Jr. MD  RECORDING TIMES  Start on 9/12/22 at 0701   Stop on 9/13/22 at 0949  Total EEG recording time - 25.5 hrs     EEG FINDINGS  Waking State:  The patient's background consists of a posteriorly dominant 10 Hz alpha rhythm, which is reasonably well formed, reasonably well modulated, and abolishes with eye opening.  Anteriorly, the patient's background consists of predominantly beta range frequencies.  There is no focal slowing.  There are no focal, lateralized, or epileptiform transients.  No electrographic seizures are seen.     Sleep state:  Normal sleep architecture is seen with features of stage N2 sleep appreciated.     Activation procedures:   Hyperventilation and photic stimulation were not performed.     Seizure/Events(s) recorded -   None    Cardiac Monitor:   Sinus rhythm     Interpretation  This is a normal LTM-EEG study demonstrating the awake, drowsy, and asleep states.  This recording provides no evidence of epilepsy.  Please be aware that a normal video EEG monitoring study that does not capture the events of interest cannot fully exclude the possibility of an underlying seizure disorder.    Comprehensive Summary  This represents the final portion of an LTM-EEG study which includes approximately 159 hours of EEG/video recordings.  Taken as a whole, it represents an abnormal video EEG due to the presence of 2 clinical seizures.  These indicate the presence of a focal onset epilepsy with an apparent right hemispheric seizure focus, possibly in the right frontal lobe.

## 2022-09-13 NOTE — PLAN OF CARE
Problem: Adult Inpatient Plan of Care  Goal: Plan of Care Review  Outcome: Ongoing, Progressing  Flowsheets (Taken 9/13/2022 0327)  Plan of Care Reviewed With:   patient   mother  Goal: Absence of Hospital-Acquired Illness or Injury  Outcome: Ongoing, Progressing  Goal: Optimal Comfort and Wellbeing  Outcome: Ongoing, Progressing     Problem: Diabetes Comorbidity  Goal: Blood Glucose Level Within Targeted Range  Outcome: Ongoing, Progressing     Problem: Seizure, Active Management  Goal: Absence of Seizure/Seizure-Related Injury  Outcome: Ongoing, Progressing           POC reviewed and updated with the patient/caregiver. Questions regarding POC were encouraged and addressed with the patient/caregiver.  VSS, see flow-sheets. Tele and VISI maintained. Patient is AO X 4 at this time. Fall/safety precautions maintained, no signs of injury noted during shift.  Seizure precautions maintained, no events noted during shift. Patient was repositioned for comfort, bed locked in low position with side rails X 3, bed alarm refused, with call light within reach. Instructed patient to call staff for mobility, verbalized understanding.  No acute signs of distress noted at this time.

## 2022-09-13 NOTE — PLAN OF CARE
[facility] ([facility])  Discharge Final Note    Primary Care Provider: Liborio Patel Jr, MD  Expected Discharge Date: 9/13/2022  Patient medically ready for discharge to home today.    Final Discharge Note (most recent)       Final Note - 09/13/22 1158          Final Note    Assessment Type Final Discharge Note     Anticipated Discharge Disposition Home or Self Care     Hospital Resources/Appts/Education Provided Provided patient/caregiver with written discharge plan information;Provided education on problems/symptoms using teachback        Post-Acute Status    Post-Acute Authorization Other     Other Status Awaiting f/u Appts     Discharge Delays None known at this time                   Important Message from Medicare         Referral Info (most recent)       Referral Info    No documentation.                   Future Appointments   Date Time Provider Department Center   10/14/2022  1:30 PM [id number]   11/14/2022  3:30 PM Geine Villanueva NP [facility]     ALLYSON Marvin  Case Management  Ext: [phone]  09/13/2022

## 2022-09-13 NOTE — PLAN OF CARE
Problem: Adult Inpatient Plan of Care  Goal: Plan of Care Review  Outcome: Met  Goal: Patient-Specific Goal (Individualized)  Outcome: Met  Goal: Absence of Hospital-Acquired Illness or Injury  Outcome: Met  Goal: Optimal Comfort and Wellbeing  Outcome: Met  Goal: Readiness for Transition of Care  Outcome: Met     Problem: Diabetes Comorbidity  Goal: Blood Glucose Level Within Targeted Range  Outcome: Met     Problem: Seizure, Active Management  Goal: Absence of Seizure/Seizure-Related Injury  Outcome: Met     Problem: Fluid Volume Deficit  Goal: Fluid Balance  Outcome: Met     Discharge paperwork and POC reviewed with the patient and they verbalized understanding. All comments and concerns addressed. Bed locked in lowest position with bed alarm set, call light within reach. Safety precautions maintained. VSS, see flowsheets. No events this shift.   Seizure precautions maintained.

## 2022-09-13 NOTE — DISCHARGE SUMMARY
"[facility] - EMU ([facility])  Neurology-Epilepsy  Discharge Summary      Patient Name: Davis Duenas  MRN: [id number]  Admission Date: 9/6/2022  Hospital Length of Stay: 6 days  Discharge Date and Time: 9/13/2022 11:45 AM  Attending Physician: Jayme Mcknight MD  Discharging Provider: Tracee Parker PA-C  Primary Care Physician: Liborio Patel Jr, MD    HPI:   Davis Duenas is a pleasant 22 year old woman admitted to the EMU for localization and management. 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    Seizure description:  Aura: includes migraine-type headaches  Events: Stares off, R sided facial twitching  beginning with her mouth extending to the eye, followed by R arm rhythmic flexion at the the wrist. Family aborts episodes with Midazolam nasally (5mg in each nostril, 10mg total). If it's not stopped, seizure extends bilaterally. Episodes usually last a few minutes. Previously, when she was younger (~ 2-3 yo), episodes would last "hours", with history of status epilepticus.  Post-ictal: sleepiness and increase in appetite  Triggers: Occurs more with stress, excitement and lack of sleep, last event Aug 7, 2022  Total: Mom estimates patient has had > 200 total lifetime events    Current AEDs:  - lacosamide 300mg BID, oxcarbazepine 600mg BID for seizures  - divalproex 500mg BID for migraines  Has not noticed side effects from current regimen. 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 No typical events were recorded. There is no electrographic evidence of status epilepticus. Spikes and sharp waves, Midline, Maximum at Pz. Continuous Slowing, Generalized, " "Theta-Delta Activities"  - EEG; last in 2021, 27 minutes, was normal  - MRI 2021: no significant findings      HPI per chart:  "The patient started having seizure in infancy at 10 days old. 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 It was initially Ithought that hyperisulinism was the cause of the seizures. Underwent pancreatectomy at age of  6 months old at [facility] thinking that would improve her condition but it did not.  The patient was started on AED medications at the age of  18 months after being diagnosed with epilepsy. The patient would exhibit multiple types of seizure like episodes she would stare off into on direction and her  mouth eyes would twitch on one side and she would began to jerk and shake. Seizures start as staring followed by facial twitching and them grand mal seizures. Initially, she was prone to having repetitive and prolonged seizures. At baseline, she would have 3 seizures every 2 months on average.Routine EEGs have been normal except at age 15 when EEG was read abnormal. The patient also has mild intellectual disability but highly functioning. Prior AEDs: PB, DZP,  TPM, LEV, PHT,  LEV and   ZNS. Current AEDs:  mg BID,  mg po BID and VPA  mg BID. Adding VPA helped tremendously. Has not had a seizure since 2021 until 2022 Adding VPA also helped eradicate her frequent severe headaches that improved with sleep and OTC NSAIDs."      * No surgery found *     Indwelling Lines/Drains at time of discharge:   Lines/Drains/Airways     None               Hospital Course:   >: Admit to EMU. Lacosamide decreased to 100 mg BID on admit; continued home Oxcarbazepine 600 mg BID and VPA  mg BID (Rx for headache management). No typical events. EEG with mild encephalopathy, left temporoparietal maximum sharp waves, no electrographic seizures. Stop VPA and decrease Oxcarbazepine to 150 mg BID. Proceed with " provoking measures for event capture- HV/PS today, sleep deprivation tonight, benadryl/tramadol 9/8 AM.  9/7>9/8: Successful sleep deprivation, received benadryl/tramadol this morning. No typical events. EEG unchanged, left temporoparietal sharps, no seizures. Will stop Lacosamide.  9/8>9/9: No typical events overnight. Oxcarbazepine stopped today, will increase ambulation with hopes of capturing event. Is not on any AEDs at this point. Patient and family counseled on need to get glucose under control in the long term.  9/9>9/10: EEG with no definite epileptiform discharges, no electrographic seizures. No typical events. Sleep deprivation tonight, benadryl/tramadol 9/11 AM.  9/10>9/11: Sleep deprivation completed overnight. Received benadryl/tramadol 9/11 AM. EEG with no definite epileptiform discharges, no electrographic seizures. No typical events. Repeat sleep deprivation for event capture.  9/11>9/12: Sleep deprivation completed overnight; benadryl/tramadol held due to seizures overnight. 2 PB activations @2342 and @0642 for electroclinical seizures with right hemispheric onset, possibly in right frontal lobe with secondary generalization; see EEG report for full details. Will give IV Lacosamide load of 450 mg x1 followed by resumed home PO dose of 300 mg BID as well as IV Brivaracetam load of 100 mg x1 followed by PO 50 mg BID. Resume  mg BID for headache. Plan to discontinue Oxcarbazepine on discharge.   9/12>9/13: EEG WNL, no electrographic seizures. Discharged home in stable condition on adjusted AED regimen: Brivaracetam 50 mg BID and Lacosamide 300 mg BID.  mg BID resumed for headache. Oxcarbazepine discontinued. Ordered outpatient MRI Epilepsy Protocol [facility]. Outpatient follow up in Epilepsy clinic with Dr. Mcknight.      Goals of Care Treatment Preferences:  Code Status: Full Code      Consults:   Consults (From admission, onward)        Status Ordering Provider     Inpatient consult to Midline  team  Once        Provider:  (Not yet assigned)    Completed SAMEER MCKNIGHT JR     Inpatient consult to Midline team  Once        Provider:  (Not yet assigned)    Completed HUBERT RAMON          Significant Labs: All pertinent lab results from the past 24 hours have been reviewed.    Significant Studies: I have reviewed all pertinent imaging results/findings within the past 24 hours.    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 Events often preceded by headache and described as 1) staring episodes associated with R facial twitching followed by RUE rhythmic flexion 2) progresses to GTC. Events triggered by stress and sleep deprivation.    - VPA 59.4, OXC 12.2, LCM 13.2  - 2 PB activations 9/11 @2342 and 9/12 @0642 for electroclinical seizures with right hemispheric onset, possibly in right frontal lobe with secondary generalization  - Discharged home in stable condition on adjusted AED regimen: Brivaracetam 50 mg BID and Lacosamide 300 mg BID  -  mg BID resumed for headache  - Oxcarbazepine discontinued  - Ordered outpatient MRI Epilepsy Protocol [facility]  - Outpatient follow up in Epilepsy clinic with Dr. 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, now transitioned to DM  Uncontrolled, A1c 9.7  - Home regimen: Detemir 17U BID, Humalog 12U TIDWM +SSI  - Inpatient regimen: Detemir 19U BID, Aspart 14U TIDWM + moderate dose SSI  - Nutrition consult completed   - DM diet; RN noted patient is noncompliant with DM diet, "consumes large amount of regular soda as well as snacks that are not sugar free"  - Educated on DM diet  - Outpatient follow up with Endocrinology     Hypothyroidism  - Chronic  - Continue home Levothyroxine 75 mcg daily        Discharged Condition: stable    Disposition: Home or Self Care    Patient Instructions:      MRI Brain Epilepsy W W/O Contrast   Standing Status: Future Standing Exp. Date: 23     Order Specific Question Answer Comments   Does the patient have a pacemaker or a defibrilator (Note: Some facilities may not be able to schedule an MRI for patients with pacemakers and defibrillators. You should contact your local radiology department to determine if this is the case.)? No    Does the patient have an aneurysm or surgical clip, pump, nerve/brain stimulator, middle/inner ear prosthesis, or other metal implant or foreign object (bullet, shrapnel)? If they have a card related to their implant, ask them to bring it. Issues related to the implant may cause the MRI to be delayed. No    Is the patient claustrophobic? No    Will the patient require sedation? No    Does the patient have any of the following conditions? Diabetes, History of Renal Disease or Hypertension requiring medical therapy? Yes    May the Radiologist modify the order per protocol to meet the clinical needs of the " "patient? Yes    Is this part of a Research Study? No    Does the patient have on a skin patch for medication with aluminized backing? No        Medications:  Reconciled Home Medications:      Medication List      START taking these medications    BRIVIACT 50 mg Tab  Generic drug: brivaracetam  Take 1 tablet (50 mg total) by mouth 2 (two) times daily.        CONTINUE taking these medications    BD ULTRA-FINE BEATRIZ PEN NEEDLE 32 gauge x 5/32" Ndle  Generic drug: pen needle, diabetic  Use with insulin 5 times a day.     blood sugar diagnostic Strp  To use to check blood sugar 4 times daily. Contour next meter     CALCIUM PHOSPHATE ORAL  Take by mouth.     cetirizine 10 MG tablet  Commonly known as: ZYRTEC  Take 10 mg by mouth once daily.     DEXCOM G6  Misc  Generic drug: blood-glucose meter,continuous  1 each by Misc.(Non-Drug; Combo Route) route continuous prn.     DEXCOM G6 SENSOR Elis  Generic drug: blood-glucose sensor  3 each by Misc.(Non-Drug; Combo Route) route continuous prn. Change every 10 days.     DEXCOM G6 TRANSMITTER Elis  Generic drug: blood-glucose transmitter  1 each by Misc.(Non-Drug; Combo Route) route continuous prn.     divalproex 500 MG Tbec  Commonly known as: DEPAKOTE  Take 1 tablet (500 mg total) by mouth every 12 (twelve) hours.     GVOKE HYPOPEN 2-PACK 1 mg/0.2 mL Atin  Generic drug: glucagon  Inject 1 mg into the skin as needed (Hypoglycemia).     HumaLOG KwikPen Insulin 100 unit/mL pen  Generic drug: insulin lispro  Inject 6 Units into the skin 3 (three) times daily before meals. Plus correction scale as directed. Max TDD 50units.     lacosamide 150 mg Tab tablet  Commonly known as: VIMPAT  Take 2 tablets (300 mg total) by mouth 2 (two) times daily.     lansoprazole 15 MG capsule  Commonly known as: PREVACID  Take 15 mg by mouth once daily.     levocetirizine 5 MG tablet  Commonly known as: XYZAL  Take 5 mg by mouth.     levothyroxine 75 MCG tablet  Commonly known as: " SYNTHROID  Take 1 tablet (75 mcg total) by mouth before breakfast.     multivitamin per tablet  Commonly known as: THERAGRAN  daily.     omeprazole 20 MG capsule  Commonly known as: PRILOSEC  Take 20 mg by mouth once daily.     ondansetron 8 MG Tbdl  Commonly known as: ZOFRAN-ODT  DISSOLVE ONE TABLET BY MOUTH EVERY 8 HOURS AS NEEDED FOR NAUSEA AND VOMITING     urine glucose-ketones test Strp  Commonly known as: KETO-DIASTIX  Use as directed.        STOP taking these medications    OXcarbazepine 600 MG Tab  Commonly known as: TRILEPTAL        ASK your doctor about these medications    LEVEMIR FLEXTOUCH U-100 INSULN 100 unit/mL (3 mL) Inpn pen  Generic drug: insulin detemir U-100  Inject 15 Units into the skin 2 (two) times daily.          Time spent on the discharge of patient: 60 minutes    Tracee Parker PA-C  Neurology-Epilepsy  [facility] ([facility])  Staff: Dr. Mcknight

## 2022-09-13 NOTE — ASSESSMENT & PLAN NOTE
"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, now transitioned to DM  Uncontrolled, A1c 9.7  - Home regimen: Detemir 17U BID, Humalog 12U TIDWM +SSI  - Inpatient regimen: Detemir 19U BID, Aspart 14U TIDWM + moderate dose SSI  - Nutrition consult completed   - DM diet; RN noted patient is noncompliant with DM diet, "consumes large amount of regular soda as well as snacks that are not sugar free"  - Educated on DM diet  - Outpatient follow up with Endocrinology   "

## 2022-09-14 ENCOUNTER — PATIENT MESSAGE (OUTPATIENT)
Dept: ADMINISTRATIVE | Facility: CLINIC | Age: 22
End: 2022-09-14
Payer: MEDICAID

## 2022-09-14 ENCOUNTER — TELEPHONE (OUTPATIENT)
Dept: NEUROLOGY | Facility: CLINIC | Age: 22
End: 2022-09-14
Payer: MEDICAID

## 2022-09-14 ENCOUNTER — PATIENT OUTREACH (OUTPATIENT)
Dept: ADMINISTRATIVE | Facility: CLINIC | Age: 22
End: 2022-09-14
Payer: MEDICAID

## 2022-09-14 DIAGNOSIS — E13.9 DIABETES MELLITUS SECONDARY TO PANCREATECTOMY: ICD-10-CM

## 2022-09-14 DIAGNOSIS — Z90.410 DIABETES MELLITUS SECONDARY TO PANCREATECTOMY: ICD-10-CM

## 2022-09-14 DIAGNOSIS — E11.649 HYPOGLYCEMIA UNAWARENESS ASSOCIATED WITH TYPE 2 DIABETES MELLITUS: ICD-10-CM

## 2022-09-14 DIAGNOSIS — E89.1 DIABETES MELLITUS SECONDARY TO PANCREATECTOMY: ICD-10-CM

## 2022-09-14 NOTE — TELEPHONE ENCOUNTER
Pt scheduled for virtual appt on 11/17/22 @ 9:40 am--spoke to pt's mother, Jorge--advised of appt details.

## 2022-09-14 NOTE — TELEPHONE ENCOUNTER
----- Message from Jayme Mcknight Jr., MD sent at 9/14/2022  2:34 PM CDT -----  Regarding: FW: EMU follow up  She does need to see me.  It can be a virtual.  It should be a 20 minute visit, as I saw her in the EMU.    ----- Message -----  From: Luz Kingston MA  Sent: 9/14/2022   2:21 PM CDT  To: Jayme Mcknight Jr., MD  Subject: FW: EMU follow up                                This pt has not been seen in clinic by you before. She is currently scheduled to see Genie Villanueva NP on 11/14/22. Should she keep the appt with Tacho Rich or reschedule with you? If so, can this be a virtual appt? And should this be for 20 or 40 minute appt?  ----- Message -----  From: Tracee Parker PA-C  Sent: 9/13/2022  12:44 PM CDT  To: [facility] Staff  Subject: EMU follow up                                    Hi,    Please schedule Ms. Duenas to have a virtual follow up appointment with Dr. Mcknight.      Thanks!  Tracee

## 2022-09-14 NOTE — PROGRESS NOTES
C3 nurse attempted to contact Davis Duenas   for a TCC post hospital discharge follow up call. No answer. Left voicemail with callback information. The patient does not have a scheduled HOSFU appointment. [facility]; unable to route message

## 2022-09-14 NOTE — TELEPHONE ENCOUNTER
Message sent to Dr. Mcknight to verify if this pt should cancel appt with Genie Villanueva NP and reschedule with him--as virtual or in clinic and as 20 or 40 minute appt? Will contact pt once Dr. Mcknight responds.

## 2022-09-14 NOTE — TELEPHONE ENCOUNTER
----- Message from Tracee Parker PA-C sent at 9/13/2022 12:42 PM CDT -----  Regarding: EMU follow up  Hi,    Please schedule Ms. Henrique to have a virtual follow up appointment with Dr. Mcknight.      Thanks!  Tracee

## 2022-09-15 NOTE — PROGRESS NOTES
C3 nurse spoke with Davis coe for a TCC post hospital discharge follow up call. Declined PCP visit.

## 2022-09-15 NOTE — PROGRESS NOTES
2nd attempt to make TCC Call. Left voicemail. Please call [phone] leave your first and last name and date of birth for Britney. I will return your call.

## 2022-09-26 ENCOUNTER — PATIENT MESSAGE (OUTPATIENT)
Dept: ENDOCRINOLOGY | Facility: CLINIC | Age: 22
End: 2022-09-26
Payer: MEDICAID

## 2022-09-26 DIAGNOSIS — E16.1 HYPERINSULINISM: ICD-10-CM

## 2022-10-05 ENCOUNTER — OFFICE VISIT (OUTPATIENT)
Dept: ENDOCRINOLOGY | Facility: CLINIC | Age: 22
End: 2022-10-05
Payer: MEDICAID

## 2022-10-05 DIAGNOSIS — Z90.410 DIABETES MELLITUS SECONDARY TO PANCREATECTOMY: Primary | ICD-10-CM

## 2022-10-05 DIAGNOSIS — Z91.199 NONCOMPLIANCE: ICD-10-CM

## 2022-10-05 DIAGNOSIS — E11.649 HYPOGLYCEMIA UNAWARENESS ASSOCIATED WITH TYPE 2 DIABETES MELLITUS: ICD-10-CM

## 2022-10-05 DIAGNOSIS — E89.1 DIABETES MELLITUS SECONDARY TO PANCREATECTOMY: Primary | ICD-10-CM

## 2022-10-05 DIAGNOSIS — E03.9 HYPOTHYROIDISM, UNSPECIFIED TYPE: ICD-10-CM

## 2022-10-05 DIAGNOSIS — E13.9 DIABETES MELLITUS SECONDARY TO PANCREATECTOMY: Primary | ICD-10-CM

## 2022-10-05 PROCEDURE — 1160F PR REVIEW ALL MEDS BY PRESCRIBER/CLIN PHARMACIST DOCUMENTED: ICD-10-PCS | Mod: CPTII,95,, | Performed by: NURSE PRACTITIONER

## 2022-10-05 PROCEDURE — 3046F PR MOST RECENT HEMOGLOBIN A1C LEVEL > 9.0%: ICD-10-PCS | Mod: CPTII,95,, | Performed by: NURSE PRACTITIONER

## 2022-10-05 PROCEDURE — 1111F PR DISCHARGE MEDS RECONCILED W/ CURRENT OUTPATIENT MED LIST: ICD-10-PCS | Mod: CPTII,95,, | Performed by: NURSE PRACTITIONER

## 2022-10-05 PROCEDURE — 99214 PR OFFICE/OUTPT VISIT, EST, LEVL IV, 30-39 MIN: ICD-10-PCS | Mod: 95,,, | Performed by: NURSE PRACTITIONER

## 2022-10-05 PROCEDURE — 1160F RVW MEDS BY RX/DR IN RCRD: CPT | Mod: CPTII,95,, | Performed by: NURSE PRACTITIONER

## 2022-10-05 PROCEDURE — 1111F DSCHRG MED/CURRENT MED MERGE: CPT | Mod: CPTII,95,, | Performed by: NURSE PRACTITIONER

## 2022-10-05 PROCEDURE — 99214 OFFICE O/P EST MOD 30 MIN: CPT | Mod: 95,,, | Performed by: NURSE PRACTITIONER

## 2022-10-05 PROCEDURE — 1159F MED LIST DOCD IN RCRD: CPT | Mod: CPTII,95,, | Performed by: NURSE PRACTITIONER

## 2022-10-05 PROCEDURE — 1159F PR MEDICATION LIST DOCUMENTED IN MEDICAL RECORD: ICD-10-PCS | Mod: CPTII,95,, | Performed by: NURSE PRACTITIONER

## 2022-10-05 PROCEDURE — 3046F HEMOGLOBIN A1C LEVEL >9.0%: CPT | Mod: CPTII,95,, | Performed by: NURSE PRACTITIONER

## 2022-10-05 RX ORDER — INSULIN LISPRO 100 [IU]/ML
12 INJECTION, SOLUTION INTRAVENOUS; SUBCUTANEOUS
Qty: 20 ML | Refills: 6 | Status: SHIPPED | OUTPATIENT
Start: 2022-10-05 | End: 2023-01-06

## 2022-10-05 RX ORDER — INSULIN DETEMIR 100 [IU]/ML
18 INJECTION, SOLUTION SUBCUTANEOUS 2 TIMES DAILY
Qty: 40 ML | Refills: 3 | Status: SHIPPED | OUTPATIENT
Start: 2022-10-05 | End: 2023-01-06 | Stop reason: SDUPTHER

## 2022-10-05 NOTE — PROGRESS NOTES
Subjective:      Patient ID: Davis Duenas is a 22 y.o. female.    Chief Complaint:  No chief complaint on file.    History of Present Illness  Davis Duenas is here for follow up of postpancreatectomy diabetes and hypothyroidism.  Previously seen by me on 8/2022.  This is a MyChart video visit.    The patient location is: [place]  The chief complaint leading to consultation is: DM  Visit type: Virtual visit with synchronous audio and video  Total time spent with patient: see below  Each patient to whom he or she provides medical services by telemedicine is:  (1) informed of the relationship between the physician and patient and the respective role of any other health care provider with respect to management of the patient; and (2) notified that he or she may decline to receive medical services by telemedicine and may withdraw from such care at any time.      Followed with Endocrinology in [place]. LOV 1/31/2020  Davis is a 19 y.o. female with CHI, S/P 98% panc as an infant in [facility] at the age of 6 months. She had hypoglycemia postop and so she was re-tried on Diazoxide which appear to work better at that time.    Over the course of the years she has done extremely well with almost no hypoglycemia while on Diazoxide and every time we tried to wean her off the Diazoxide she gets a recurrence of her hypoglycemia however earlier this year we are finally able to get her off Diazoxide. ( February of 2019). After 3 months her hemoglobin A1c 6.1%. Today she comes to clinic because a recent hemoglobin A1c was performed by her family doctor that was 7% and a random blood sugar was performed that was 191 mg/dL. Glucometer testing at home reveals fasting blood sugars ranging from 187-220 by glucometer. She does not have symptoms of polyuria or polydipsia or weight loss.    Thus based on this information she has random blood sugars greater than 200 with a hemoglobin A1c of 7% in the absence of symptoms indicates  that she has diabetes that is likely status post 98% pancreatectomy as 95 % of patients who have a 98% pancreatectomy will developed diabetes by age 50    Mom is present for visit.      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     Outside labs scanned in media tab  C peptide 1.6    Diagnosed:   Known complications:  DKA -  RN -  PN -  Nephropathy -  CAD -  Denies history of pancreatitis & personal/family history of medullary thyroid cancer.     Current regimen:   Levemir 15 units twice daily  Humalog 12 units plus correction scale before meals  Add correction scale if needed.  Blood sugar 180 to 230 add 1 units  Blood sugar 231 to 280 add 2 units  Blood sugar 281 to 330 add 3 units  Blood sugar 331 to 380 add 4 units  Blood sugar greater than 380 add 5 units      Reports compliance with basal insulin.  Noncompliant with prandial insulin.   Rotating injection sites.     Other medications tried:  None.     CGM 2020    Glucose Monitor:   4 times a day testing  Log reviewed: oral recall  Global hyperglycemia >200-389, 2 outliers in 400 and 500     Patient has diabetes mellitus and manages diabetes with an intensive insulin regimen. The patient requires a therapeutic CGM and is willing to use therapeutic CGM for the necesary frequent adjustments of insulin therapy. Patient has been using SMBG for frequent glucose monitoring (4+ times daily). I have completed an in-person visit during the previous 6 months and will continue to have in-person visits every 6 months to assess adherence to their CGM regimen and diabetes treatment plan.    Hypoglycemia: Denies any recent   Hypoglycemia unawareness.  Knows how to correct with 15 grams of carbs- juice, coke, or a peppermint.     Diet/Exercise:  Eats 2 meals a day.   Snacks : cheese crackers   Drinks : diet coke and water with sugar free crystal lite   Exercise:  None.      Education - last visit: 9/2020  Eye Exam: > 1 year ago  Podiatry: None    Diabetes Management Status    Hemoglobin A1C   Date Value Ref Range Status   09/06/2022 9.7 (H) 4.0 - 5.6 % Final     Comment:     ADA Screening Guidelines:  5.7-6.4%  Consistent with prediabetes  >or=6.5%  Consistent with diabetes    High levels of fetal hemoglobin interfere with the HbA1C  assay. Heterozygous hemoglobin variants (HbS, HgC, etc)do  not significantly interfere with this assay.   However, presence of multiple variants may affect accuracy.     06/16/2021 11.2 (H) 4.0 - 5.6 % Final     Comment:     ADA Screening Guidelines:  5.7-6.4%  Consistent with prediabetes  >or=6.5%  Consistent with diabetes    High levels of fetal hemoglobin interfere with the HbA1C  assay. Heterozygous hemoglobin variants (HbS, HgC, etc)do  not significantly interfere with this assay.   However, presence of multiple variants may affect accuracy.     07/07/2020 7.8 (H) 4.0 - 5.6 % Final     Comment:     ADA Screening Guidelines:  5.7-6.4%  Consistent with prediabetes  >or=6.5%  Consistent with diabetes  High levels of fetal hemoglobin interfere with the HbA1C  assay. Heterozygous hemoglobin variants (HbS, HgC, etc)do  not significantly interfere with this assay.   However, presence of multiple variants may affect accuracy.         Statin: Not taking  ACE/ARB: Not taking  Screening or Prevention Patient's value Goal Complete/Controlled?   HgA1C Testing and Control   Lab Results   Component Value Date    HGBA1C 9.7 (H) 09/06/2022      Annually/Less than 8% Yes   Lipid profile Most Recent Lipid Panel Health Maintenance Topic Completion: Not Found Annually No   LDL control No results found for: LDLCALC Annually/Less than 100 mg/dl  No   Nephropathy screening No results found for: LABMICR  No results found for: PROTEINUA Annually No   Blood pressure BP Readings from Last 1 Encounters:   09/13/22 117/68    Less than 140/90 Yes   Dilated retinal exam  Most Recent Eye Exam Date: Not Found Annually No   Foot exam   Most Recent Foot Exam Date: Not Found Annually No     With regards to hypothyroidism:    Lab Results   Component Value Date    TSH 2.815 06/16/2021     Current Medication:  Levothyroxine 75mcg daily    Takes thyroid medication properly without food first thing in the morning.    Current Symptoms:   - Weight gain  + Fatigue   - Constipation   - Hair loss  - Brittle nails  - Mental fog   - cp, palpations or sob  - Heat intolerance  - Cold intolerance    Review of Systems  as above        There were no vitals taken for this visit.    There is no height or weight on file to calculate BMI.    Lab Review:   Lab Results   Component Value Date    HGBA1C 9.7 (H) 09/06/2022    HGBA1C 11.2 (H) 06/16/2021    HGBA1C 7.8 (H) 07/07/2020       No results found for: CHOL, HDL, LDLCALC, TRIG, CHOLHDL  Lab Results   Component Value Date     09/06/2022    K 4.1 09/06/2022     09/06/2022    CO2 24 09/06/2022     (H) 09/06/2022    BUN 16 09/06/2022    CREATININE 0.7 09/06/2022    CALCIUM 9.2 09/06/2022    PROT 7.3 09/06/2022    ALBUMIN 3.7 09/06/2022    BILITOT 0.3 09/06/2022    ALKPHOS 61 09/06/2022    AST 23 09/06/2022    ALT 13 09/06/2022    ANIONGAP 9 09/06/2022    ESTGFRAFRICA >60.0 12/30/2021    EGFRNONAA >60.0 12/30/2021    TSH 2.815 06/16/2021     Vit D, 25-Hydroxy   Date Value Ref Range Status   07/07/2020 34 30 - 96 ng/mL Final     Comment:     Vitamin D deficiency.........<10 ng/mL                              Vitamin D insufficiency......10-29 ng/mL       Vitamin D sufficiency........> or equal to 30 ng/mL  Vitamin D toxicity............>100 ng/mL       Assessment and Plan     1. 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 Hypothyroidism, unspecified type  TSH      3. Hypoglycemia unawareness  associated with type 2 diabetes mellitus        4. 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  Persistent hypoglycemia on Diazoxide until 2019.  Now transitioned to DM.    -- C peptide 1.6.  -- A1c goal <7.0%.  -- Medications discussed:  Insulin   DPP4i  -- Reviewed logs/CGM:  Global hyperglycemia.  Instructed to send glucose logs in 7-14 days.  Reach out to me sooner for any glucose <70 or consistently >200.  -- Patient would benefit from personal CGM. Not covered at pharmacy. Touch base with Dex[employer] rep.  -- Medication Changes:   Levemir 18 units twice daily  Humalog 12 units plus correction scale before meals  Add correction scale if needed.  Blood sugar 180 to 230 add 1 units  Blood sugar 231 to 280 add 2 units  Blood sugar 281 to 330 add 3 units  Blood sugar 331 to 380 add 4 units  Blood sugar greater than 380 add 5 units  Januvia 100mg daily    -- Reviewed goals of therapy are to get the best control we can without hypoglycemia.  -- Reviewed patient's current insulin regimen. Clarified proper insulin dose and timing in relation to meals, etc. Insulin injection sites and proper rotation instructed.    -- Advised frequent self blood glucose monitoring.  Patient encouraged to document glucose results and bring them to every clinic visit.  -- Hypoglycemia precautions discussed. Instructed on precautions before driving.    -- Call for Bg repeatedly < 90 or > 180.   -- Close adherence to lifestyle changes recommended.   -- Periodic follow ups for eye evaluations, foot care and dental care suggested.    Hypothyroidism  -- Labs now,  -- Medication Changes:   CONTINUE:  Levothyroxine 75mcg daily  -- Clinically and biochemically euthyroid.  -- Goal is a normal TSH.  -- Avoid exogenous hyperthyroidism as this can accelerate bone loss and increase risk of CV complications.  -- Advised to take LT4 on an empty  stomach with water and to wait 30-45 minutes before eating or taking other medications   -- Reviewed usual times of thyroid hormone changes  -- Reviewed that symptoms of hypothyroidism may not correlate with tsh, and a normal TSH is the goal of therapy. Symptoms are not a justification for over treatment.    Hypoglycemia unawareness associated with type 2 diabetes mellitus  -- Patient would benefit from personal CGM.    Noncompliance  -- Discussed DM, progression, and complications.  -- Encouraged compliance.  -- Discussed signs/symptoms of DKA, monitor and what would warrant patient to go to ED.  -- Gvoke ordered.     Follow up in about 3 months (around 1/5/2023).      I spent 30 minutes face-to-face with the patient, over half of the visit was spent on counseling and/or coordinating the care of the patient.    Counseling includes:  Diagnostic results, impressions, recommendations   Prognosis   Risk and benefits of management/treatment options   Instructions for management treatment and or follow-up   Importance of compliance with management   Risk factor reduction   Patient education

## 2022-10-05 NOTE — ASSESSMENT & PLAN NOTE
-- S/p 98% pancreatectomy at 6months old for  hyperinsulinism hypoglycemia.  Persistent hypoglycemia on Diazoxide until 2019.  Now transitioned to DM.    -- C peptide 1.6.  -- A1c goal <7.0%.  -- Medications discussed:  Insulin   DPP4i  -- Reviewed logs/CGM:  Global hyperglycemia.  Instructed to send glucose logs in 7-14 days.  Reach out to me sooner for any glucose <70 or consistently >200.  -- Patient would benefit from personal CGM. Not covered at pharmacy. Touch base with [employer] rep.  -- Medication Changes:   Levemir 18 units twice daily  Humalog 12 units plus correction scale before meals  Add correction scale if needed.  Blood sugar 180 to 230 add 1 units  Blood sugar 231 to 280 add 2 units  Blood sugar 281 to 330 add 3 units  Blood sugar 331 to 380 add 4 units  Blood sugar greater than 380 add 5 units  Januvia 100mg daily    -- Reviewed goals of therapy are to get the best control we can without hypoglycemia.  -- Reviewed patient's current insulin regimen. Clarified proper insulin dose and timing in relation to meals, etc. Insulin injection sites and proper rotation instructed.    -- Advised frequent self blood glucose monitoring.  Patient encouraged to document glucose results and bring them to every clinic visit.  -- Hypoglycemia precautions discussed. Instructed on precautions before driving.    -- Call for Bg repeatedly < 90 or > 180.   -- Close adherence to lifestyle changes recommended.   -- Periodic follow ups for eye evaluations, foot care and dental care suggested.

## 2022-10-05 NOTE — ASSESSMENT & PLAN NOTE
-- Discussed DM, progression, and complications.  -- Encouraged compliance.  -- Discussed signs/symptoms of DKA, monitor and what would warrant patient to go to ED.  -- Gvoke ordered.

## 2022-10-05 NOTE — ASSESSMENT & PLAN NOTE
-- Labs now,  -- Medication Changes:   CONTINUE:  Levothyroxine 75mcg daily  -- Clinically and biochemically euthyroid.  -- Goal is a normal TSH.  -- Avoid exogenous hyperthyroidism as this can accelerate bone loss and increase risk of CV complications.  -- Advised to take LT4 on an empty stomach with water and to wait 30-45 minutes before eating or taking other medications   -- Reviewed usual times of thyroid hormone changes  -- Reviewed that symptoms of hypothyroidism may not correlate with tsh, and a normal TSH is the goal of therapy. Symptoms are not a justification for over treatment.

## 2022-10-09 ENCOUNTER — PATIENT MESSAGE (OUTPATIENT)
Dept: NEUROLOGY | Facility: CLINIC | Age: 22
End: 2022-10-09
Payer: MEDICAID

## 2022-10-14 ENCOUNTER — HOSPITAL ENCOUNTER (OUTPATIENT)
Dept: RADIOLOGY | Facility: HOSPITAL | Age: 22
Discharge: HOME | End: 2022-10-14
Payer: MEDICAID

## 2022-10-14 DIAGNOSIS — G40.109 FOCAL EPILEPSY: ICD-10-CM

## 2022-10-14 DIAGNOSIS — G40.919 REFRACTORY EPILEPSY: ICD-10-CM

## 2022-10-14 PROCEDURE — A9585 GADOBUTROL INJECTION: HCPCS | Performed by: PHYSICIAN ASSISTANT

## 2022-10-14 PROCEDURE — 25500020 PHARM REV CODE 255: Performed by: PHYSICIAN ASSISTANT

## 2022-10-14 PROCEDURE — 70553 MRI BRAIN EPILEPSY W W/O CONTRAST: ICD-10-PCS | Mod: 26,,, | Performed by: RADIOLOGY

## 2022-10-14 PROCEDURE — 70553 MRI BRAIN STEM W/O & W/DYE: CPT | Mod: 26,,, | Performed by: RADIOLOGY

## 2022-10-14 PROCEDURE — 70553 MRI BRAIN STEM W/O & W/DYE: CPT | Mod: TC

## 2022-10-14 RX ORDER — GADOBUTROL 604.72 MG/ML
9 INJECTION INTRAVENOUS
Status: COMPLETED | OUTPATIENT
Start: 2022-10-14 | End: 2022-10-14

## 2022-10-14 RX ADMIN — GADOBUTROL 8 ML: 604.72 INJECTION INTRAVENOUS at 02:10

## 2022-11-09 ENCOUNTER — PATIENT MESSAGE (OUTPATIENT)
Dept: NEUROLOGY | Facility: CLINIC | Age: 22
End: 2022-11-09
Payer: MEDICAID

## 2022-11-10 DIAGNOSIS — E16.1 HYPERINSULINISM: ICD-10-CM

## 2022-11-17 ENCOUNTER — OFFICE VISIT (OUTPATIENT)
Dept: NEUROLOGY | Facility: CLINIC | Age: 22
End: 2022-11-17
Payer: MEDICAID

## 2022-11-17 DIAGNOSIS — G40.909 SEIZURE DISORDER: Primary | ICD-10-CM

## 2022-11-17 DIAGNOSIS — F32.A DEPRESSION, UNSPECIFIED DEPRESSION TYPE: ICD-10-CM

## 2022-11-17 PROCEDURE — 1159F MED LIST DOCD IN RCRD: CPT | Mod: CPTII,95,, | Performed by: PSYCHIATRY & NEUROLOGY

## 2022-11-17 PROCEDURE — 3046F HEMOGLOBIN A1C LEVEL >9.0%: CPT | Mod: CPTII,95,, | Performed by: PSYCHIATRY & NEUROLOGY

## 2022-11-17 PROCEDURE — 99213 OFFICE O/P EST LOW 20 MIN: CPT | Mod: 95,,, | Performed by: PSYCHIATRY & NEUROLOGY

## 2022-11-17 PROCEDURE — 3046F PR MOST RECENT HEMOGLOBIN A1C LEVEL > 9.0%: ICD-10-PCS | Mod: CPTII,95,, | Performed by: PSYCHIATRY & NEUROLOGY

## 2022-11-17 PROCEDURE — 99213 PR OFFICE/OUTPT VISIT, EST, LEVL III, 20-29 MIN: ICD-10-PCS | Mod: 95,,, | Performed by: PSYCHIATRY & NEUROLOGY

## 2022-11-17 PROCEDURE — 1159F PR MEDICATION LIST DOCUMENTED IN MEDICAL RECORD: ICD-10-PCS | Mod: CPTII,95,, | Performed by: PSYCHIATRY & NEUROLOGY

## 2022-11-17 RX ORDER — CHLORPHENIRAMIN/PSEUDOEPHED/DM 1-15-5MG/5
LIQUID (ML) ORAL
COMMUNITY
Start: 2022-06-28

## 2022-11-17 RX ORDER — URINE ACETONE TEST STRIPS
STRIP MISCELLANEOUS
COMMUNITY
Start: 2022-08-25

## 2022-11-17 NOTE — PROGRESS NOTES
"Date of service:  11/17/2022     The patient location is: home  The chief complaint leading to consultation is: seizures  Visit type: audiovisual  Total time spent with patient: 20 minutes  Each patient to whom he or she provides medical services by telemedicine is:  (1) informed of the relationship between the physician and patient and the respective role of any other health care provider with respect to management of the patient; and (2) notified that he or she may decline to receive medical services by telemedicine and may withdraw from such care at any time.    Interval history:  The patient is a 22 y.o. female seen in the EMU for a seizure disorder.  She was discharged from the EMU about 2 months ago.  She has had no seizures since that time.  Her behavior has worsened somewhat since the discontinuation of her Trileptal/initiation of the Briviact.  Otherwise, there are no new complaints.    Past Medical History:   Diagnosis Date    Hyperinsulinism     Seizures        No past surgical history on file.    No family history on file.    Social History     Socioeconomic History    Marital status: Single   Tobacco Use    Smoking status: Never    Smokeless tobacco: Never   Substance and Sexual Activity    Alcohol use: Never    Drug use: Never    Sexual activity: Never     Partners: Male        Review of patient's allergies indicates:  No Known Allergies     Review of Systems  The patient's ROS is noncontributory except as noted above.     Physical Exam  Exam limited as this was performed as a virtual visit.    Data base:    Labs (9/22):  CMP: unremarkable  CBC: unremarkable    MRI brain:  "Unremarkable significant motion distorted MRI brain as detailed above specifically without definite parenchymal signal abnormality or intracranial enhancing lesion."    vEEG:  "This represents the final portion of an LTM-EEG study which includes approximately 159 hours of EEG/video recordings.  Taken as a whole, it represents an " "abnormal video EEG due to the presence of 2 clinical seizures.  These indicate the presence of a focal onset epilepsy with an apparent right hemispheric seizure focus, possibly in the right frontal lobe."    Assessment and plan:  The patient is a 22 y.o. female we have been asked to see for evaluation for events worrisome for seizures. The differential includes a focal onset with a right hemispheric seizure focus, possibly a right frontal lobe seizure focus.  As far as medications go, we will continue the patient on Vimpat and Briviact. Medication side effects were discussed with the patient.  State law as it pertains to driving for individuals with seizures was discussed.  The patient was also counseled on seizure safety.     I have referred the patient to psychiatry for management of her behavioral issues.    We will plan on seeing the patient back in a few months.  "

## 2022-12-15 DIAGNOSIS — E03.9 HYPOTHYROIDISM, UNSPECIFIED TYPE: ICD-10-CM

## 2022-12-15 RX ORDER — LEVOTHYROXINE SODIUM 75 UG/1
75 TABLET ORAL
Qty: 90 TABLET | Refills: 0 | Status: SHIPPED | OUTPATIENT
Start: 2022-12-15 | End: 2023-01-06 | Stop reason: SDUPTHER

## 2023-01-03 NOTE — PROGRESS NOTES
Subjective:      Patient ID: Davis Duenas is a 22 y.o. female.    Chief Complaint:  No chief complaint on file.      History of Present Illness  Davis Duenas is here for follow up of postpancreatectomy diabetes and hypothyroidism.  Previously seen by me on 10/2022.  This is a MyChart video visit.    The patient location is: [place]  The chief complaint leading to consultation is: DM  Visit type: Virtual visit with synchronous audio and video  Total time spent with patient: see below  Each patient to whom he or she provides medical services by telemedicine is:  (1) informed of the relationship between the physician and patient and the respective role of any other health care provider with respect to management of the patient; and (2) notified that he or she may decline to receive medical services by telemedicine and may withdraw from such care at any time.      Followed with Endocrinology in [place]. LOV 1/31/2020  Davis is a 19 y.o. female with CHI, S/P 98% panc as an infant in [facility] at the age of 6 months. She had hypoglycemia postop and so she was re-tried on Diazoxide which appear to work better at that time.    Over the course of the years she has done extremely well with almost no hypoglycemia while on Diazoxide and every time we tried to wean her off the Diazoxide she gets a recurrence of her hypoglycemia however earlier this year we are finally able to get her off Diazoxide. ( February of 2019). After 3 months her hemoglobin A1c 6.1%. Today she comes to clinic because a recent hemoglobin A1c was performed by her family doctor that was 7% and a random blood sugar was performed that was 191 mg/dL. Glucometer testing at home reveals fasting blood sugars ranging from 187-220 by glucometer. She does not have symptoms of polyuria or polydipsia or weight loss.    Thus based on this information she has random blood sugars greater than 200 with a hemoglobin A1c of 7% in the absence of symptoms  indicates that she has diabetes that is likely status post 98% pancreatectomy as 95 % of patients who have a 98% pancreatectomy will developed diabetes by age 50    Mom is present for visit.      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     Outside labs scanned in media tab  C peptide 1.6    Diagnosed:   Known complications:  DKA -  RN -  PN -  Nephropathy -  CAD -  Denies history of pancreatitis & personal/family history of medullary thyroid cancer.     Current regimen:   Levemir 18 units twice daily  Humalog 12 units plus correction scale before meals  Add correction scale if needed.  Blood sugar 180 to 230 add 1 units  Blood sugar 231 to 280 add 2 units  Blood sugar 281 to 330 add 3 units  Blood sugar 331 to 380 add 4 units  Blood sugar greater than 380 add 5 units  Januvia 100mg daily      Reports compliance with basal insulin.  Noncompliant with prandial insulin.   Rotating injection sites.     Other medications tried:  None.     CGM 2020    Glucose Monitor:   4 times a day testing  Log reviewed: oral recall  When taking medication consistently glucose is 130-180s  If noncompliant, glucose > 200s    Patient has diabetes mellitus and manages diabetes with an intensive insulin regimen. The patient requires a therapeutic CGM and is willing to use therapeutic CGM for the necesary frequent adjustments of insulin therapy. Patient has been using [employer] for frequent glucose monitoring (4+ times daily). I have completed an in-person visit during the previous 6 months and will continue to have in-person visits every 6 months to assess adherence to their CGM regimen and diabetes treatment plan.    Hypoglycemia:   Hypoglycemia unawareness.  Knows how to correct with 15 grams of carbs- juice, coke, or a peppermint.     Diet/Exercise:  Eats 2 meals a day.   Snacks : cheese crackers   Drinks : diet coke and  water with sugar free crystal lite   Exercise: None.      Education - last visit: 9/2020  Eye Exam: > 1 year ago  Podiatry: None    Diabetes Management Status    Hemoglobin A1C   Date Value Ref Range Status   09/06/2022 9.7 (H) 4.0 - 5.6 % Final     Comment:     ADA Screening Guidelines:  5.7-6.4%  Consistent with prediabetes  >or=6.5%  Consistent with diabetes    High levels of fetal hemoglobin interfere with the HbA1C  assay. Heterozygous hemoglobin variants (HbS, HgC, etc)do  not significantly interfere with this assay.   However, presence of multiple variants may affect accuracy.     06/16/2021 11.2 (H) 4.0 - 5.6 % Final     Comment:     ADA Screening Guidelines:  5.7-6.4%  Consistent with prediabetes  >or=6.5%  Consistent with diabetes    High levels of fetal hemoglobin interfere with the HbA1C  assay. Heterozygous hemoglobin variants (HbS, HgC, etc)do  not significantly interfere with this assay.   However, presence of multiple variants may affect accuracy.     07/07/2020 7.8 (H) 4.0 - 5.6 % Final     Comment:     ADA Screening Guidelines:  5.7-6.4%  Consistent with prediabetes  >or=6.5%  Consistent with diabetes  High levels of fetal hemoglobin interfere with the HbA1C  assay. Heterozygous hemoglobin variants (HbS, HgC, etc)do  not significantly interfere with this assay.   However, presence of multiple variants may affect accuracy.         Statin: Not taking  ACE/ARB: Not taking  Screening or Prevention Patient's value Goal Complete/Controlled?   HgA1C Testing and Control   Lab Results   Component Value Date    HGBA1C 9.7 (H) 09/06/2022      Annually/Less than 8% Yes   Lipid profile Most Recent Lipid Panel Health Maintenance Topic Completion: Not Found Annually No   LDL control No results found for: LDLCALC Annually/Less than 100 mg/dl  No   Nephropathy screening No results found for: LABMICR  No results found for: PROTEINUA Annually No   Blood pressure BP Readings from Last 1 Encounters:   09/13/22 117/68     Less than 140/90 Yes   Dilated retinal exam Most Recent Eye Exam Date: Not Found Annually No   Foot exam   Most Recent Foot Exam Date: Not Found Annually No     With regards to hypothyroidism:    Lab Results   Component Value Date    TSH 2.299 10/14/2022     Current Medication:  Levothyroxine 75mcg daily    Takes thyroid medication properly without food first thing in the morning.    Current Symptoms:   - Weight gain  + Fatigue   - Constipation   - Hair loss  - Brittle nails  - Mental fog   - cp, palpations or sob  - Heat intolerance  - Cold intolerance    Review of Systems  As above        There were no vitals taken for this visit.    There is no height or weight on file to calculate BMI.    Lab Review:   Lab Results   Component Value Date    HGBA1C 9.7 (H) 09/06/2022    HGBA1C 11.2 (H) 06/16/2021    HGBA1C 7.8 (H) 07/07/2020       No results found for: CHOL, HDL, LDLCALC, TRIG, CHOLHDL  Lab Results   Component Value Date     09/06/2022    K 4.1 09/06/2022     09/06/2022    CO2 24 09/06/2022     (H) 09/06/2022    BUN 16 09/06/2022    CREATININE 0.7 09/06/2022    CALCIUM 9.2 09/06/2022    PROT 7.3 09/06/2022    ALBUMIN 3.7 09/06/2022    BILITOT 0.3 09/06/2022    ALKPHOS 61 09/06/2022    AST 23 09/06/2022    ALT 13 09/06/2022    ANIONGAP 9 09/06/2022    ESTGFRAFRICA >60.0 12/30/2021    EGFRNONAA >60.0 12/30/2021    TSH 2.299 10/14/2022     Vit D, 25-Hydroxy   Date Value Ref Range Status   07/07/2020 34 30 - 96 ng/mL Final     Comment:     Vitamin D deficiency.........<10 ng/mL                              Vitamin D insufficiency......10-29 ng/mL       Vitamin D sufficiency........> or equal to 30 ng/mL  Vitamin D toxicity............>100 ng/mL       Assessment and Plan     1. 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 Hypothyroidism,  unspecified type  levothyroxine (SYNTHROID) 75 MCG tablet      3. 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  Persistent hypoglycemia on Diazoxide until 2019.  Now transitioned to DM.    -- C peptide 1.6.  -- A1c goal <7.0%.  -- Medications discussed:  Insulin   DPP4i  -- Reviewed logs/CGM:  Glucose relatively controlled when taking medication consistently.   Instructed to send glucose logs in 7-14 days.  Reach out to me sooner for any glucose <70 or consistently >200.  -- Patient would benefit from personal CGM. Not covered at pharmacy. Touch base with [email or web address] rep.  -- Consider 70/30 to aid in compliance -however, she does not eat breakfast or have much of a set schedule.   -- Medication Changes:   Levemir 18 units twice daily  Humalog 12 units plus correction scale before meals  Add correction scale if needed.  Blood sugar 180 to 230 add 1 units  Blood sugar 231 to 280 add 2 units  Blood sugar 281 to 330 add 3 units  Blood sugar 331 to 380 add 4 units  Blood sugar greater than 380 add 5 units  Januvia 100mg daily    -- Reviewed goals of therapy are to get the best control we can without hypoglycemia.  -- Reviewed patient's current insulin regimen. Clarified proper insulin dose and timing in relation to meals, etc. Insulin injection sites and proper rotation instructed.    -- Advised frequent self blood glucose monitoring.  Patient encouraged to document glucose results and bring them to every clinic visit.  -- Hypoglycemia precautions discussed. Instructed on precautions before driving.    -- Call for Bg repeatedly < 90 or > 180.   -- Close adherence to lifestyle changes recommended.   -- Periodic follow ups for eye evaluations, foot care and dental care suggested.    Hypothyroidism  -- Medication Changes:   CONTINUE:  Levothyroxine 75mcg daily  --  Clinically and biochemically euthyroid.  -- Goal is a normal TSH.  -- Avoid exogenous hyperthyroidism as this can accelerate bone loss and increase risk of CV complications.  -- Advised to take LT4 on an empty stomach with water and to wait 30-45 minutes before eating or taking other medications   -- Reviewed usual times of thyroid hormone changes  -- Reviewed that symptoms of hypothyroidism may not correlate with tsh, and a normal TSH is the goal of therapy. Symptoms are not a justification for over treatment.    Hypoglycemia unawareness associated with type 2 diabetes mellitus  -- Patient would benefit from personal CGM.      Follow up in about 4 months (around 5/6/2023).      I spent 30 minutes face-to-face with the patient, over half of the visit was spent on counseling and/or coordinating the care of the patient.    Counseling includes:  Diagnostic results, impressions, recommendations   Prognosis   Risk and benefits of management/treatment options   Instructions for management treatment and or follow-up   Importance of compliance with management   Risk factor reduction   Patient education

## 2023-01-06 ENCOUNTER — OFFICE VISIT (OUTPATIENT)
Dept: ENDOCRINOLOGY | Facility: CLINIC | Age: 23
End: 2023-01-06
Payer: MEDICAID

## 2023-01-06 DIAGNOSIS — E89.1 DIABETES MELLITUS SECONDARY TO PANCREATECTOMY: Primary | ICD-10-CM

## 2023-01-06 DIAGNOSIS — E11.649 HYPOGLYCEMIA UNAWARENESS ASSOCIATED WITH TYPE 2 DIABETES MELLITUS: ICD-10-CM

## 2023-01-06 DIAGNOSIS — Z90.410 DIABETES MELLITUS SECONDARY TO PANCREATECTOMY: Primary | ICD-10-CM

## 2023-01-06 DIAGNOSIS — E03.9 HYPOTHYROIDISM, UNSPECIFIED TYPE: ICD-10-CM

## 2023-01-06 DIAGNOSIS — E13.9 DIABETES MELLITUS SECONDARY TO PANCREATECTOMY: Primary | ICD-10-CM

## 2023-01-06 PROCEDURE — 1160F RVW MEDS BY RX/DR IN RCRD: CPT | Mod: CPTII,95,, | Performed by: NURSE PRACTITIONER

## 2023-01-06 PROCEDURE — 1159F PR MEDICATION LIST DOCUMENTED IN MEDICAL RECORD: ICD-10-PCS | Mod: CPTII,95,, | Performed by: NURSE PRACTITIONER

## 2023-01-06 PROCEDURE — 1160F PR REVIEW ALL MEDS BY PRESCRIBER/CLIN PHARMACIST DOCUMENTED: ICD-10-PCS | Mod: CPTII,95,, | Performed by: NURSE PRACTITIONER

## 2023-01-06 PROCEDURE — 99214 OFFICE O/P EST MOD 30 MIN: CPT | Mod: 95,,, | Performed by: NURSE PRACTITIONER

## 2023-01-06 PROCEDURE — 99214 PR OFFICE/OUTPT VISIT, EST, LEVL IV, 30-39 MIN: ICD-10-PCS | Mod: 95,,, | Performed by: NURSE PRACTITIONER

## 2023-01-06 PROCEDURE — 1159F MED LIST DOCD IN RCRD: CPT | Mod: CPTII,95,, | Performed by: NURSE PRACTITIONER

## 2023-01-06 RX ORDER — INSULIN LISPRO 100 [IU]/ML
12 INJECTION, SOLUTION INTRAVENOUS; SUBCUTANEOUS
Qty: 20 ML | Refills: 6 | Status: SHIPPED | OUTPATIENT
Start: 2023-01-06

## 2023-01-06 RX ORDER — LEVOTHYROXINE SODIUM 75 UG/1
75 TABLET ORAL
Qty: 90 TABLET | Refills: 3 | Status: SHIPPED | OUTPATIENT
Start: 2023-01-06 | End: 2024-01-06

## 2023-01-06 RX ORDER — INSULIN DETEMIR 100 [IU]/ML
18 INJECTION, SOLUTION SUBCUTANEOUS 2 TIMES DAILY
Qty: 40 ML | Refills: 3 | Status: SHIPPED | OUTPATIENT
Start: 2023-01-06 | End: 2023-02-05

## 2023-01-06 NOTE — ASSESSMENT & PLAN NOTE
-- S/p 98% pancreatectomy at 6months old for  hyperinsulinism hypoglycemia.  Persistent hypoglycemia on Diazoxide until 2019.  Now transitioned to DM.    -- C peptide 1.6.  -- A1c goal <7.0%.  -- Medications discussed:  Insulin   DPP4i  -- Reviewed logs/CGM:  Glucose relatively controlled when taking medication consistently.   Instructed to send glucose logs in 7-14 days.  Reach out to me sooner for any glucose <70 or consistently >200.  -- Patient would benefit from personal CGM. Not covered at pharmacy. Touch base with [employer] rep.  -- Consider 70/30 to aid in compliance -however, she does not eat breakfast or have much of a set schedule.   -- Medication Changes:   Levemir 18 units twice daily  Humalog 12 units plus correction scale before meals  Add correction scale if needed.  Blood sugar 180 to 230 add 1 units  Blood sugar 231 to 280 add 2 units  Blood sugar 281 to 330 add 3 units  Blood sugar 331 to 380 add 4 units  Blood sugar greater than 380 add 5 units  Januvia 100mg daily    -- Reviewed goals of therapy are to get the best control we can without hypoglycemia.  -- Reviewed patient's current insulin regimen. Clarified proper insulin dose and timing in relation to meals, etc. Insulin injection sites and proper rotation instructed.    -- Advised frequent self blood glucose monitoring.  Patient encouraged to document glucose results and bring them to every clinic visit.  -- Hypoglycemia precautions discussed. Instructed on precautions before driving.    -- Call for Bg repeatedly < 90 or > 180.   -- Close adherence to lifestyle changes recommended.   -- Periodic follow ups for eye evaluations, foot care and dental care suggested.

## 2023-01-06 NOTE — ASSESSMENT & PLAN NOTE
-- Medication Changes:   CONTINUE:  Levothyroxine 75mcg daily  -- Clinically and biochemically euthyroid.  -- Goal is a normal TSH.  -- Avoid exogenous hyperthyroidism as this can accelerate bone loss and increase risk of CV complications.  -- Advised to take LT4 on an empty stomach with water and to wait 30-45 minutes before eating or taking other medications   -- Reviewed usual times of thyroid hormone changes  -- Reviewed that symptoms of hypothyroidism may not correlate with tsh, and a normal TSH is the goal of therapy. Symptoms are not a justification for over treatment.

## 2023-01-12 ENCOUNTER — PATIENT MESSAGE (OUTPATIENT)
Dept: NEUROLOGY | Facility: CLINIC | Age: 23
End: 2023-01-12
Payer: MEDICAID

## 2023-01-18 ENCOUNTER — PATIENT MESSAGE (OUTPATIENT)
Dept: NEUROLOGY | Facility: CLINIC | Age: 23
End: 2023-01-18
Payer: MEDICAID

## 2023-01-27 ENCOUNTER — PATIENT MESSAGE (OUTPATIENT)
Dept: ENDOCRINOLOGY | Facility: CLINIC | Age: 23
End: 2023-01-27
Payer: MEDICAID

## 2023-02-09 ENCOUNTER — PATIENT MESSAGE (OUTPATIENT)
Dept: NEUROLOGY | Facility: CLINIC | Age: 23
End: 2023-02-09
Payer: MEDICAID

## 2023-02-09 DIAGNOSIS — E89.1 DIABETES MELLITUS SECONDARY TO PANCREATECTOMY: ICD-10-CM

## 2023-02-09 DIAGNOSIS — E13.9 DIABETES MELLITUS SECONDARY TO PANCREATECTOMY: ICD-10-CM

## 2023-02-09 DIAGNOSIS — G40.109 EPILEPSY, LOCALIZATION-RELATED: ICD-10-CM

## 2023-02-09 DIAGNOSIS — R42 DIZZINESS AND GIDDINESS: ICD-10-CM

## 2023-02-09 DIAGNOSIS — Z90.410 DIABETES MELLITUS SECONDARY TO PANCREATECTOMY: ICD-10-CM

## 2023-02-09 DIAGNOSIS — G44.221 CHRONIC TENSION-TYPE HEADACHE, INTRACTABLE: ICD-10-CM

## 2023-02-09 RX ORDER — DIVALPROEX SODIUM 500 MG/1
500 TABLET, DELAYED RELEASE ORAL EVERY 12 HOURS
Qty: 60 TABLET | Refills: 5 | Status: SHIPPED | OUTPATIENT
Start: 2023-02-09 | End: 2024-02-09

## 2023-02-09 RX ORDER — LACOSAMIDE 150 MG/1
300 TABLET ORAL 2 TIMES DAILY
Qty: 360 TABLET | Refills: 5 | Status: SHIPPED | OUTPATIENT
Start: 2023-02-09 | End: 2024-02-09

## 2023-05-10 ENCOUNTER — TELEPHONE (OUTPATIENT)
Dept: NEUROLOGY | Facility: CLINIC | Age: 23
End: 2023-05-10
Payer: MEDICAID

## 2023-05-15 ENCOUNTER — PATIENT MESSAGE (OUTPATIENT)
Dept: ENDOCRINOLOGY | Facility: CLINIC | Age: 23
End: 2023-05-15
Payer: MEDICAID

## 2023-05-15 DIAGNOSIS — E89.1 DIABETES MELLITUS SECONDARY TO PANCREATECTOMY: ICD-10-CM

## 2023-05-15 DIAGNOSIS — Z90.410 DIABETES MELLITUS SECONDARY TO PANCREATECTOMY: ICD-10-CM

## 2023-05-15 DIAGNOSIS — E13.9 DIABETES MELLITUS SECONDARY TO PANCREATECTOMY: ICD-10-CM

## 2023-05-17 ENCOUNTER — PATIENT MESSAGE (OUTPATIENT)
Dept: ENDOCRINOLOGY | Facility: CLINIC | Age: 23
End: 2023-05-17
Payer: MEDICAID

## 2023-06-26 ENCOUNTER — PATIENT MESSAGE (OUTPATIENT)
Dept: ENDOCRINOLOGY | Facility: CLINIC | Age: 23
End: 2023-06-26
Payer: MEDICAID

## 2023-07-12 ENCOUNTER — TELEPHONE (OUTPATIENT)
Dept: NEUROLOGY | Facility: CLINIC | Age: 23
End: 2023-07-12
Payer: MEDICAID

## 2023-07-12 NOTE — TELEPHONE ENCOUNTER
----- Message from Jayme Dawson sent at 7/12/2023  4:37 PM CDT -----  Regarding: est care // former pt dr luis m JENNINGS  Contact: Sarita at [phone]  Type:  Sooner Appointment Request    Caller is requesting a sooner appointment.  Caller declined first available appointment listed below.  Caller will not accept being placed on the waitlist and is requesting a message be sent to doctor.    Name of Caller:  mom // Sarita    When is the first available appointment?  Dept book    Symptoms:  CHRISTOS JENNINGS     Best Call Back Number:  [phone]    Additional Information:

## 2023-07-13 DIAGNOSIS — Z90.410 DIABETES MELLITUS SECONDARY TO PANCREATECTOMY: ICD-10-CM

## 2023-07-13 DIAGNOSIS — R42 DIZZINESS AND GIDDINESS: ICD-10-CM

## 2023-07-13 DIAGNOSIS — E13.9 DIABETES MELLITUS SECONDARY TO PANCREATECTOMY: ICD-10-CM

## 2023-07-13 DIAGNOSIS — G40.109 EPILEPSY, LOCALIZATION-RELATED: ICD-10-CM

## 2023-07-13 DIAGNOSIS — G44.221 CHRONIC TENSION-TYPE HEADACHE, INTRACTABLE: ICD-10-CM

## 2023-07-13 DIAGNOSIS — E89.1 DIABETES MELLITUS SECONDARY TO PANCREATECTOMY: ICD-10-CM

## 2023-07-13 RX ORDER — DIVALPROEX SODIUM 500 MG/1
500 TABLET, DELAYED RELEASE ORAL EVERY 12 HOURS
Qty: 60 TABLET | Refills: 5 | Status: SHIPPED | OUTPATIENT
Start: 2023-07-13 | End: 2024-01-09

## 2023-07-28 ENCOUNTER — HOSPITAL ENCOUNTER (OUTPATIENT)
Dept: TELEMEDICINE | Facility: OTHER | Age: 23
Discharge: HOME | End: 2023-07-28
Payer: MEDICAID

## 2023-07-28 PROCEDURE — 99205 PR OFFICE/OUTPT VISIT, NEW, LEVL V, 60-74 MIN: ICD-10-PCS | Mod: 95,,, | Performed by: STUDENT IN AN ORGANIZED HEALTH CARE EDUCATION/TRAINING PROGRAM

## 2023-07-28 PROCEDURE — 99205 OFFICE O/P NEW HI 60 MIN: CPT | Mod: 95,,, | Performed by: STUDENT IN AN ORGANIZED HEALTH CARE EDUCATION/TRAINING PROGRAM

## 2023-07-28 NOTE — CONSULTS
"[facility]  Psychiatry  Telepsychiatry Consult Note    Please see previous notes:    Patient agreeable to consultation via telepsychiatry.    Tele-Consultation from Psychiatry started: 7/28/2023 at 09:43AM  The chief complaint leading to psychiatric consultation is: psychiatric evaluation  This consultation was requested by Dr. Vishal Viveros, the Emergency Department attending physician.  The location of the consulting psychiatrist is [place], [place].  The patient location is [facility] [place] [facility]   The patient arrived at the ED at: 09:05AM    Also present with the patient at the time of the consultation: no one    Patient Identification:   Davis Duenas is a 22 y.o. female.    Patient information was obtained from patient, ER records, and primary team.  Patient presented involuntarily to the Emergency Department by the 's office following an OPC issued by patient's mother.    Consults  Consult Start Time: 07/28/2023 09:43 CDT  Consult End Time: 07/28/2023 11:21 CDT      Subjective:     History of Present Illness:  Davis Duenas is a 23y/o young woman with a history of type 1 diabetes and intellectual disability per ED nurse taking care of her today.    ED nurse reports that patient presented to [facility] ED last night, left without being seen and went across town to another ED where she received IV fluids and left without being seen. The presenting complaint was that she wanted her IUD out.    She was brought in via an OPC filled out by her mother. ED nurse reports that OPC had the following narrative:  "Suspecting that she is using drugs. She is not taking her insulin like she is supposed to. Left the ER AMA refusing treatment. Told her mom she would never be good enough."    On interview:  Davis is tearful, upset and is repeatedly crying, "I want to go home." She says that "my mother put me here." She reports that she was awakened at 6am " "by "police officers banging on the door." She says that she was with her fiance at the time and that she currently lives with him. "I've known him for a month. We are getting  next year." She says that her mother "doesn't want me to live my life. She does not want me to grow up."    She denies any past psychiatric history and denies feeling depressed, denies psychotic symptoms, denies SI and denies HI.    When asked about her medical history, she cannot provide it. She is not aware of her current medications but says that she takes medications for "seizures." When asked for elaboration she says that she has had seizures "since I was a baby." Asked if she takes her insulin and she says "yes." When asked if she is still having pain at her IUD site, she says, "yes." "My momma made me put it in five years ago." She denies any fevers or chills at this time. She does not know who placed the IUD. She then says that she had the IUD placed "two years ago and I didn't even want it."    She refuses to provide a number for her fiance with whom she is living because "I don't give out numbers without asking first." On further interview, she says that she is living with her "fiance and his family." She is vague about who comprises the family but says "his sibling and their husbands and wives."    Discussed with patient that this provider would need to contact her mother as her mother filled out an OPC which is the reason for her evaluation in the hospital.    Collateral: Sarita Duenas, mother, [phone]:  "Davis functions at a 13y/o level. She doesn't manage money, she doesn't have a concept of time or abstract thinking. She can't live independently. Her last IQ test done in 2015, put her at a 2nd grade verbal level and a 4th grade math level." "She is not taking her medications right." Mom has a durable power of ." Mother is an RN, currently working in a behavioral health facility. She says Davis has " "always been in special ed. She is 22years old but mentally she is not." Mom takes care of her medical issues. She does not take her insulin, "which needs to be refrigerated." "She is set up with a check because God forbid something happens to me and her daddy." Mom says that her sister checked in on her when she was in the hospital a few weeks ago and "she didn't even recognize me." Mom is worries that she is using drugs. A family member who worked at an ED in [facility] saw Davis recently and reported to mom, "she didn't even recognize me."    Mom is worried that Davis may be depressed currently because of the content of what she has been posting on her TikTok. Mom says that she has been trying to manage her issues outpatient and has an apptmt for Davis at [facility] (a behavioral health clinic) in August but Davis is refusing to go. Mom says that Davis has been staying with the father of a friend who is in his early 40s for the past week. Mom found a text from him saying "I think you're beautiful I would like to have a relationship with you." Mom says that Davis is very suggestible and she is worried that her daughter is being taken advantage of. She is aware of her daughter's complaints of her IUD hurting and says that Davis's friend recently told her about her IUD hurting and that her friend is pregnant.    Mother says that she is worried about her daughter's safety. She says that her daughter has lost "110 pounds" in the last seven to eight months and she has not changed her eating habits at all. Mom confirms that Davis has a history of epilepsy since she was a baby (focal currently, last seizure in March or April 2023), type 1 diabetes (with 95% of her pancreas is removed), hypothyroidism, and spondylolysis - followed by a neurologist, endocrinologist, and rheumatologist.    Her current medications are (verifed by patient's mother who is an RN):  -Vimpat 300mg PO BID  -Depakote 500mg " "PO BID  -Briviact 50mg PO BID  -Januvia 100mg PO Qdaily  -levothyroxine 75mcg PO Qdaily  -Levemir 25U BI  -Humalog 12U before each meal plus sliding scale but she does not check her sugars  -calcium 600mg    Psychiatric History:   Previous Psychiatric Hospitalizations: No  Previous Medication Trials: No  Previous Suicide Attempts: no   History of Violence: no  History of Depression: no  History of Mansi: no  History of Auditory/Visual Hallucination: no  History of Delusions: no  Outpatient psychiatrist (current & past): No    Substance Abuse History:  Tobacco: Vapes  Alcohol: No  Illicit Substances: No - denies marijuana, cocaine, heroin, other illicits  Detox/Rehab: No    Legal History: Past charges/incarcerations: No     Family Psychiatric History: Mom with h/o depression    Social History:  Developmental/Childhood: says that she grew up with her mother and father and an older sibling; says that she has a history of seizures since a baby and was in and out of the hospital many times  Education: Graduated high school in 2019, is unsure if she was in regular classes or special ed classes; per mom has always been in special ed classes  Employment Status/Finances: "I support myself with the money I have." Says she is on disability but not sure what it is for. "My mom put me on it."   Relationship Status/Sexual Orientation: currently in a relationship with her fiance who she has known for a year  Children: none  Housing Status: lives with her fiance for a month    history:  NO  Access to gun: NO  Yazidi: says that she is not a Pentecostal person  Recreational activities: "sleep and hang out with my family that I live with, babysit"    Psychiatric Mental Status Exam:  Arousal: alert  Sensorium/Orientation: oriented to person, place, situation, day of week, month of year, year  Behavior/Cooperation: childlike, irritable, uncooperative  Speech: spontaneous, fluent, childlike, angry  Language: grossly intact  Mood: " "" I just want to go home! "   Affect: upset, reactive, irritable, childlike  Thought Process: linear, simplistic  Thought Content:   Auditory hallucinations: NO  Visual hallucinations: NO  Paranoia: NO  Delusions:  NO  Suicidal ideation: DENIES  Homicidal ideation: DENIES  Attention/Concentration: has difficulty attending to conversation  Memory: questionable due to reliability  Fund of Knowledge: minimally aware of current events   Abstract reasoning: concrete  Insight: poor  Judgment: limited      Past Medical History:   Past Medical History:   Diagnosis Date    Hyperinsulinism     Seizures       Laboratory Data: Labs Reviewed - No data to display    Neurological History:  Seizures: Yes, "yes since I was a baby"  Head trauma: No    Allergies:   Review of patient's allergies indicates:  No Known Allergies    Medications in ER: Medications - No data to display    No new subjective & objective note has been filed under this hospital service since the last note was generated.      Assessment - Diagnosis - Goals:     Diagnosis/Impression:   Borderline Intellectual Disability  Rule out depression, unspecified  Rule out substance use disorder vs substance induced mood disorder  Nonadherence to Medical Treatment  History of Epilepsy  History of Type 1 Diabetes  History of hypothyroidism  History of spondylolysis    Recommendations:  --Given extensive collateral information collected from patient's mother (see above HPI), recommend PEC'ing patient for grave disability and transferring to an inpatient behavioral health facility (med-psych unit preferred given patient's history of epilepsy and type 1 diabetes) once patient is medically cleared.  --Would recommend ordering basic lab panel in ED prior to inpatient BH transfer: CBC, CMP, TSH with reflex free T4, vitamin D, vitamin B12, HgbA1c  --Please continue patient's current medications verified by patient's mother who is an RN (listed here and in HPI):  -Vimpat 300mg PO " BID  -Depakote 500mg PO BID  -Briviact 50mg PO BID  -Januvia 100mg PO Qdaily  -levothyroxine 75mcg PO Qdaily  -Levemir 25U BI  -Humalog 12U before each meal plus sliding scale but she does not check her sugars  -calcium 600mg  --Please contact patient's mother who is her durable medical power of  related to patient's transfer and medical care. Name and number: Sarita Duenas, mother, [phone]  --Defer to inpatient psychiatrist for initiation of any psychotropic medications once psychiatric diagnosis can be clarified  --Per mother, patient has an outpatient mental health apptmt at [facility] scheduled for August 1st at 11am.    Time with patient: over 90mins      More than 50% of the time was spent counseling/coordinating care    Consulting clinician was informed of the encounter and consult note.    Consultation ended: 7/28/2023 at 11:21AM    Marge Larson MD   Psychiatry  [facility]

## 2023-08-01 DIAGNOSIS — G40.109 EPILEPSY, LOCALIZATION-RELATED: Primary | ICD-10-CM

## 2023-08-01 RX ORDER — BRIVARACETAM 50 MG/1
50 TABLET, FILM COATED ORAL 2 TIMES DAILY
COMMUNITY
Start: 2023-06-28 | End: 2023-08-01 | Stop reason: SDUPTHER

## 2023-09-18 ENCOUNTER — TELEPHONE (OUTPATIENT)
Dept: NEUROLOGY | Facility: CLINIC | Age: 23
End: 2023-09-18
Payer: MEDICAID